# Patient Record
Sex: FEMALE | Race: OTHER | Employment: UNEMPLOYED | URBAN - METROPOLITAN AREA
[De-identification: names, ages, dates, MRNs, and addresses within clinical notes are randomized per-mention and may not be internally consistent; named-entity substitution may affect disease eponyms.]

---

## 2018-01-29 ENCOUNTER — OFFICE VISIT (OUTPATIENT)
Dept: INTERNAL MEDICINE CLINIC | Age: 43
End: 2018-01-29

## 2018-01-29 VITALS
SYSTOLIC BLOOD PRESSURE: 116 MMHG | OXYGEN SATURATION: 97 % | TEMPERATURE: 97.3 F | RESPIRATION RATE: 17 BRPM | DIASTOLIC BLOOD PRESSURE: 72 MMHG | WEIGHT: 133 LBS | HEIGHT: 60 IN | BODY MASS INDEX: 26.11 KG/M2 | HEART RATE: 73 BPM

## 2018-01-29 DIAGNOSIS — M25.561 ACUTE PAIN OF RIGHT KNEE: Primary | ICD-10-CM

## 2018-01-29 DIAGNOSIS — Z78.9 NON-ENGLISH SPEAKING PATIENT: ICD-10-CM

## 2018-01-29 DIAGNOSIS — M54.6 CHRONIC RIGHT-SIDED THORACIC BACK PAIN: ICD-10-CM

## 2018-01-29 DIAGNOSIS — R10.13 EPIGASTRIC ABDOMINAL PAIN: ICD-10-CM

## 2018-01-29 DIAGNOSIS — G89.29 CHRONIC RIGHT-SIDED THORACIC BACK PAIN: ICD-10-CM

## 2018-01-29 RX ORDER — PHENOL/SODIUM PHENOLATE
20 AEROSOL, SPRAY (ML) MUCOUS MEMBRANE DAILY
Qty: 30 TAB | Refills: 0 | Status: SHIPPED | OUTPATIENT
Start: 2018-01-29 | End: 2018-05-01 | Stop reason: SDUPTHER

## 2018-01-29 RX ORDER — NAPROXEN 500 MG/1
500 TABLET ORAL
Qty: 30 TAB | Refills: 1 | Status: SHIPPED | OUTPATIENT
Start: 2018-01-29 | End: 2018-02-26

## 2018-01-29 NOTE — MR AVS SNAPSHOT
[address] [place] [address]. [facility]man[address]or [facility] 
[phone] Patient: Belem Michaels MRN: [id number]:[id number] Visit Information Date & Time Provider Department Dept. Phone Encounter #  
 1/29/2018 11:15 AM Johnathan Byrnes MD [facility] Internal Medicine [phone] [id number] Follow-up Instructions Return in about 3 months (around 4/29/2018), or if symptoms worsen or fail to improve. Allergies as of 1/29/2018  Review Complete On: 1/29/2018 By: Whitney Freeman MD  
 No Known Allergies Current Immunizations  Never Reviewed No immunizations on file. Not reviewed this visit You Were Diagnosed With   
  
 Codes Comments Acute pain of right knee    -  Primary ICD-10-CM: M25.561 ICD-9-CM: 719.46 Non-English speaking patient     ICD-10-CM: Z78.9 ICD-9-CM: V40.1 Epigastric abdominal pain     ICD-10-CM: R10.13 ICD-9-CM: 789.06 Vitals BP Pulse Temp Resp Height(growth percentile) 116/72 (BP 1 Location: Left arm, BP Patient Position: Sitting) 73 97.3 °F (36.3 °C) (Temporal) 17 5' (1.524 m) Weight(growth percentile) SpO2 BMI Smoking Status 133 lb (60.3 kg) 97% 25.97 kg/m2 Never Smoker Vitals History BMI and BSA Data Body Mass Index Body Surface Area  
 25.97 kg/m 2 1.6 m 2 Your Updated Medication List  
  
   
This list is accurate as of: 1/29/18 12:07 PM.  Always use your most recent med list.  
  
  
  
  
 naproxen 500 mg tablet Commonly known as:  NAPROSYN Take 1 Tab by mouth two (2) times daily as needed. With food. Omeprazole delayed release 20 mg tablet Commonly known as:  PRILOSEC D/R Take 1 Tab by mouth daily. Prescriptions Printed Refills  
 naproxen (NAPROSYN) 500 mg tablet 1 Sig: Take 1 Tab by mouth two (2) times daily as needed. With food. Class: Print  Route: Oral  
 Omeprazole delayed release (PRILOSEC D/R) 20 mg tablet 0  
 Sig: Take 1 Tab by mouth daily. Class: Print Route: Oral  
  
Follow-up Instructions Return in about 3 months (around 4/29/2018), or if symptoms worsen or fail to improve. To-Do List   
 01/29/2018 Imaging:  XR KNEE RT MIN 4 V Introducing [facility] & HEALTH SERVICES! Elias Franco introduces Ahonya patient portal. Now you can access parts of your medical record, email your doctor's office, and request medication refills online. 1. In your internet browser, go to [email or web address] 2. Click on the First Time User? Click Here link in the Sign In box. You will see the New Member Sign Up page. 3. Enter your Ahonya Access Code exactly as it appears below. You will not need to use this code after youve completed the sign-up process. If you do not sign up before the expiration date, you must request a new code. · Ahonya Access Code: [email or web address] Expires: 4/29/2018 11:44 AM 
 
4. Enter the last four digits of your Social Security Number (xxxx) and Date of Birth (mm/dd/yyyy) as indicated and click Submit. You will be taken to the next sign-up page. 5. Create a Ahonya ID. This will be your Ahonya login ID and cannot be changed, so think of one that is secure and easy to remember. 6. Create a Ahonya password. You can change your password at any time. 7. Enter your Password Reset Question and Answer. This can be used at a later time if you forget your password. 8. Enter your e-mail address. You will receive e-mail notification when new information is available in [address] 9. Click Sign Up. You can now view and download portions of your medical record. 10. Click the Download Summary menu link to download a portable copy of your medical information. If you have questions, please visit the Frequently Asked Questions section of the Ahonya website. Remember, Ahonya is NOT to be used for urgent needs. For medical emergencies, dial 911. Now available from your iPhone and Android! Please provide this summary of care documentation to your next provider. If you have any questions after today's visit, please call [phone].
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
## 2018-01-30 ENCOUNTER — HOSPITAL ENCOUNTER (OUTPATIENT)
Dept: GENERAL RADIOLOGY | Age: 43
Discharge: HOME | End: 2018-01-30
Attending: FAMILY MEDICINE
Payer: MEDICAID

## 2018-01-30 DIAGNOSIS — M25.561 ACUTE PAIN OF RIGHT KNEE: ICD-10-CM

## 2018-01-30 PROCEDURE — 73562 X-RAY EXAM OF KNEE 3: CPT

## 2018-02-01 NOTE — PROGRESS NOTES
Please mail the result: There is arthritic change in  right knee joint otherwise no fracture or fluid. Follow up with me if symptoms worsen.

## 2018-02-26 ENCOUNTER — OFFICE VISIT (OUTPATIENT)
Dept: INTERNAL MEDICINE CLINIC | Age: 43
End: 2018-02-26

## 2018-02-26 VITALS
SYSTOLIC BLOOD PRESSURE: 114 MMHG | OXYGEN SATURATION: 95 % | TEMPERATURE: 98.2 F | RESPIRATION RATE: 18 BRPM | BODY MASS INDEX: 26.31 KG/M2 | DIASTOLIC BLOOD PRESSURE: 74 MMHG | HEART RATE: 84 BPM | HEIGHT: 60 IN | WEIGHT: 134 LBS

## 2018-02-26 DIAGNOSIS — M54.9 CHRONIC MIDLINE BACK PAIN, UNSPECIFIED BACK LOCATION: Primary | ICD-10-CM

## 2018-02-26 DIAGNOSIS — M54.2 NECK PAIN: ICD-10-CM

## 2018-02-26 DIAGNOSIS — R52 BODY ACHES: ICD-10-CM

## 2018-02-26 DIAGNOSIS — G89.29 CHRONIC MIDLINE BACK PAIN, UNSPECIFIED BACK LOCATION: Primary | ICD-10-CM

## 2018-02-26 DIAGNOSIS — M25.561 ARTHRALGIA OF KNEE, RIGHT: ICD-10-CM

## 2018-02-26 DIAGNOSIS — G89.29 UPPER BACK PAIN, CHRONIC: ICD-10-CM

## 2018-02-26 DIAGNOSIS — M54.9 UPPER BACK PAIN, CHRONIC: ICD-10-CM

## 2018-02-26 RX ORDER — DICLOFENAC SODIUM 75 MG/1
75 TABLET, DELAYED RELEASE ORAL 2 TIMES DAILY
Qty: 60 TAB | Refills: 1 | Status: SHIPPED | OUTPATIENT
Start: 2018-02-26 | End: 2018-03-28 | Stop reason: SDUPTHER

## 2018-02-26 RX ORDER — DULOXETIN HYDROCHLORIDE 20 MG/1
20 CAPSULE, DELAYED RELEASE ORAL DAILY
Qty: 30 CAP | Refills: 3 | Status: SHIPPED | OUTPATIENT
Start: 2018-02-26 | End: 2018-03-28 | Stop reason: SDUPTHER
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
## 2018-02-26 NOTE — MR AVS SNAPSHOT
[address] [place] [address] [address] [place] [address] 
[phone] Patient: Son Capps MRN: [id number] Visit Information Date & Time Provider Department Dept. Phone Encounter #  
 2/26/2018  4:15 PM John Thao MD [facility] Internal Medicine [phone] [id number] Follow-up Instructions Return in about 1 month (around 3/26/2018). Upcoming Health Maintenance Date Due  
 PAP AKA CERVICAL CYTOLOGY 1/1/1995 DTaP/Tdap/Td series (2 - Td) 1/11/2028 Allergies as of 2/26/2018  Review Complete On: 2/26/2018 By: John Thao MD  
 No Known Allergies Current Immunizations  Never Reviewed Name Date Influenza Vaccine 1/11/2018 Tdap 1/11/2018 Not reviewed this visit You Were Diagnosed With   
  
 Codes Comments Chronic midline back pain, unspecified back location    -  Primary ICD-10-CM: M54.9, G89.29 ICD-9-CM: 724.5, 338.29 Upper back pain, chronic     ICD-10-CM: M54.9, G89.29 ICD-9-CM: 724.5, 338.29 Neck pain     ICD-10-CM: M54.2 ICD-9-CM: 723.1 Body aches     ICD-10-CM: R52 ICD-9-CM: 780.96 Arthralgia of knee, right     ICD-10-CM: M25.561 ICD-9-CM: 719.46 Vitals BP Pulse Temp Resp Height(growth percentile) 114/74 (BP 1 Location: Left arm, BP Patient Position: Sitting) 84 98.2 °F (36.8 °C) (Temporal) 18 5' (1.524 m) Weight(growth percentile) SpO2 BMI OB Status Smoking Status 134 lb (60.8 kg) 95% 26.17 kg/m2 Having regular periods Never Smoker Vitals History BMI and BSA Data Body Mass Index Body Surface Area  
 26.17 kg/m 2 1.6 m 2 Your Updated Medication List  
  
   
This list is accurate as of 2/26/18  4:36 PM.  Always use your most recent med list.  
  
  
  
  
 diclofenac EC 75 mg EC tablet Commonly known as:  VOLTAREN Take 1 Tab by mouth two (2) times a day. DULoxetine 20 mg capsule Commonly known as:  CYMBALTA Take 1 Cap by mouth daily. Omeprazole delayed release 20 mg tablet Commonly known as:  PRILOSEC D/R Take 1 Tab by mouth daily. Prescriptions Printed Refills DULoxetine (CYMBALTA) 20 mg capsule 3 Sig: Take 1 Cap by mouth daily. Class: Print Route: Oral  
 diclofenac EC (VOLTAREN) 75 mg EC tablet 1 Sig: Take 1 Tab by mouth two (2) times a day. Class: Print Route: Oral  
  
Follow-up Instructions Return in about 1 month (around 3/26/2018). To-Do List   
 02/26/2018 Imaging:  XR SPINE CERV 4 OR 5 V   
  
 02/26/2018 Imaging:  XR SPINE LUMB 2 OR 3 V   
  
 02/26/2018 Imaging:  XR SPINE THORAC 3 V Patient Instructions Learning About How to Have a Healthy Back What causes back pain? Back pain is often caused by overuse, strain, or injury. For example, people often hurt their backs playing sports or working in the yard, being jolted in a car accident, or lifting something too heavy. Aging plays a part too. Your bones and muscles tend to lose strength as you age, which makes injury more likely. The spongy discs between the bones of the spine (vertebrae) may suffer from wear and tear and no longer provide enough cushion between the bones. A disc that bulges or breaks open (herniated disc) can press on nerves, causing back pain. In some people, back pain is the result of arthritis, broken vertebrae caused by bone loss (osteoporosis), illness, or a spine problem. Although most people have back pain at one time or another, there are steps you can take to make it less likely. How can you have a healthy back? Reduce stress on your back through good posture Slumping or slouching alone may not cause low back pain. But after the back has been strained or injured, bad posture can make pain worse.  
· Sleep in a position that maintains your back's normal curves and on a mattress that feels comfortable. Sleep on your side with a pillow between your knees, or sleep on your back with a pillow under your knees. These positions can reduce strain on your back. · Stand and sit up straight. \"Good posture\" generally means your ears, shoulders, and hips are in a straight line. · If you must stand for a long time, put one foot on a stool, ledge, or box. Switch feet every now and then. · Sit in a chair that is low enough to let you place both feet flat on the floor with both knees nearly level with your hips. If your chair or desk is too high, use a footrest to raise your knees. Place a small pillow, a rolled-up towel, or a lumbar roll in the curve of your back if you need extra support. · Try a kneeling chair, which helps tilt your hips forward. This takes pressure off your lower back. · Try sitting on an exercise ball. It can rock from side to side, which helps keep your back loose. · When driving, keep your knees nearly level with your hips. Sit straight, and drive with both hands on the steering wheel. Your arms should be in a slightly bent position. Reduce stress on your back through careful lifting · Squat down, bending at the hips and knees only. If you need to, put one knee to the floor and extend your other knee in front of you, bent at a right angle (half kneeling). · Press your chest straight forward. This helps keep your upper back straight while keeping a slight arch in your low back. · Hold the load as close to your body as possible, at the level of your belly button (navel). · Use your feet to change direction, taking small steps. · Lead with your hips as you change direction. Keep your shoulders in line with your hips as you move. · Set down your load carefully, squatting with your knees and hips only. Exercise and stretch your back · Do some exercise on most days of the week, if your doctor says it is okay. You can walk, run, swim, or cycle. · Stretch your back muscles. Here are a few exercises to try: ¨ Lie on your back, and gently pull one bent knee to your chest. Put that foot back on the floor, and then pull the other knee to your chest. 
¨ Do pelvic tilts. Lie on your back with your knees bent. Tighten your stomach muscles. Pull your belly button (navel) in and up toward your ribs. You should feel like your back is pressing to the floor and your hips and pelvis are slightly lifting off the floor. Hold for 6 seconds while breathing smoothly. ¨ Sit with your back flat against a wall. · Keep your core muscles strong. The muscles of your back, belly (abdomen), and buttocks support your spine. ¨ Pull in your belly and imagine pulling your navel toward your spine. Hold this for 6 seconds, then relax. Remember to keep breathing normally as you tense your muscles. ¨ Do curl-ups. Always do them with your knees bent. Keep your low back on the floor, and curl your shoulders toward your knees using a smooth, slow motion. Keep your arms folded across your chest. If this bothers your neck, try putting your hands behind your neck (not your head), with your elbows spread apart. ¨ Lie on your back with your knees bent and your feet flat on the floor. Tighten your belly muscles, and then push with your feet and raise your buttocks up a few inches. Hold this position 6 seconds as you continue to breathe normally, then lower yourself slowly to the floor. Repeat 8 to 12 times. ¨ If you like group exercise, try Pilates or yoga. These classes have poses that strengthen the core muscles. Lead a healthy lifestyle · Stay at a healthy weight to avoid strain on your back. · Do not smoke. Smoking increases the risk of osteoporosis, which weakens the spine. If you need help quitting, talk to your doctor about stop-smoking programs and medicines. These can increase your chances of quitting for good. Where can you learn more? Go to http://gisel-melani.info/. Enter L315 in the search box to learn more about \"Learning About How to Have a Healthy Back. \" Current as of: March 21, 2017 Content Version: 11.4 © 0640-8095 Healthwise, Incorporated. Care instructions adapted under license by [employer] (which disclaims liability or warranty for this information). If you have questions about a medical condition or this instruction, always ask your healthcare professional. [address] any warranty or liability for your use of this information. Introducing [facility] & HEALTH SERVICES! Robert Azul introduces Viewpoint patient portal. Now you can access parts of your medical record, email your doctor's office, and request medication refills online. 1. In your internet browser, go to [email or web address] 2. Click on the First Time User? Click Here link in the Sign In box. You will see the New Member Sign Up page. 3. Enter your Viewpoint Access Code exactly as it appears below. You will not need to use this code after youve completed the sign-up process. If you do not sign up before the expiration date, you must request a new code. · Viewpoint Access Code: [email or web address] Expires: 4/29/2018 11:44 AM 
 
4. Enter the last four digits of your Social Security Number (xxxx) and Date of Birth (mm/dd/yyyy) as indicated and click Submit. You will be taken to the next sign-up page. 5. Create a Viewpoint ID. This will be your Viewpoint login ID and cannot be changed, so think of one that is secure and easy to remember. 6. Create a Viewpoint password. You can change your password at any time. 7. Enter your Password Reset Question and Answer. This can be used at a later time if you forget your password. 8. Enter your e-mail address. You will receive e-mail notification when new information is available in [address] 9. Click Sign Up.  You can now view and download portions of your medical record. 10. Click the Download Summary menu link to download a portable copy of your medical information. If you have questions, please visit the Frequently Asked Questions section of the [email or web address] website. Remember, [email or web address] is NOT to be used for urgent needs. For medical emergencies, dial 911. Now available from your iPhone and Android! Please provide this summary of care documentation to your next provider. Your primary care clinician is listed as Johnathan Pressley. If you have any questions after today's visit, please call [phone].

## 2018-02-26 NOTE — PATIENT INSTRUCTIONS
Learning About How to Have a Healthy Back  What causes back pain? Back pain is often caused by overuse, strain, or injury. For example, people often hurt their backs playing sports or working in the yard, being jolted in a car accident, or lifting something too heavy. Aging plays a part too. Your bones and muscles tend to lose strength as you age, which makes injury more likely. The spongy discs between the bones of the spine (vertebrae) may suffer from wear and tear and no longer provide enough cushion between the bones. A disc that bulges or breaks open (herniated disc) can press on nerves, causing back pain. In some people, back pain is the result of arthritis, broken vertebrae caused by bone loss (osteoporosis), illness, or a spine problem. Although most people have back pain at one time or another, there are steps you can take to make it less likely. How can you have a healthy back? Reduce stress on your back through good posture  Slumping or slouching alone may not cause low back pain. But after the back has been strained or injured, bad posture can make pain worse. · Sleep in a position that maintains your back's normal curves and on a mattress that feels comfortable. Sleep on your side with a pillow between your knees, or sleep on your back with a pillow under your knees. These positions can reduce strain on your back. · Stand and sit up straight. \"Good posture\" generally means your ears, shoulders, and hips are in a straight line. · If you must stand for a long time, put one foot on a stool, ledge, or box. Switch feet every now and then. · Sit in a chair that is low enough to let you place both feet flat on the floor with both knees nearly level with your hips. If your chair or desk is too high, use a footrest to raise your knees. Place a small pillow, a rolled-up towel, or a lumbar roll in the curve of your back if you need extra support.   · Try a kneeling chair, which helps tilt your hips forward. This takes pressure off your lower back. · Try sitting on an exercise ball. It can rock from side to side, which helps keep your back loose. · When driving, keep your knees nearly level with your hips. Sit straight, and drive with both hands on the steering wheel. Your arms should be in a slightly bent position. Reduce stress on your back through careful lifting  · Squat down, bending at the hips and knees only. If you need to, put one knee to the floor and extend your other knee in front of you, bent at a right angle (half kneeling). · Press your chest straight forward. This helps keep your upper back straight while keeping a slight arch in your low back. · Hold the load as close to your body as possible, at the level of your belly button (navel). · Use your feet to change direction, taking small steps. · Lead with your hips as you change direction. Keep your shoulders in line with your hips as you move. · Set down your load carefully, squatting with your knees and hips only. Exercise and stretch your back  · Do some exercise on most days of the week, if your doctor says it is okay. You can walk, run, swim, or cycle. · Stretch your back muscles. Here are a few exercises to try:  Ronald Ort on your back, and gently pull one bent knee to your chest. Put that foot back on the floor, and then pull the other knee to your chest.  ¨ Do pelvic tilts. Lie on your back with your knees bent. Tighten your stomach muscles. Pull your belly button (navel) in and up toward your ribs. You should feel like your back is pressing to the floor and your hips and pelvis are slightly lifting off the floor. Hold for 6 seconds while breathing smoothly. ¨ Sit with your back flat against a wall. · Keep your core muscles strong. The muscles of your back, belly (abdomen), and buttocks support your spine. ¨ Pull in your belly and imagine pulling your navel toward your spine. Hold this for 6 seconds, then relax.  Remember to keep breathing normally as you tense your muscles. ¨ Do curl-ups. Always do them with your knees bent. Keep your low back on the floor, and curl your shoulders toward your knees using a smooth, slow motion. Keep your arms folded across your chest. If this bothers your neck, try putting your hands behind your neck (not your head), with your elbows spread apart. ¨ Lie on your back with your knees bent and your feet flat on the floor. Tighten your belly muscles, and then push with your feet and raise your buttocks up a few inches. Hold this position 6 seconds as you continue to breathe normally, then lower yourself slowly to the floor. Repeat 8 to 12 times. ¨ If you like group exercise, try Pilates or yoga. These classes have poses that strengthen the core muscles. Lead a healthy lifestyle  · Stay at a healthy weight to avoid strain on your back. · Do not smoke. Smoking increases the risk of osteoporosis, which weakens the spine. If you need help quitting, talk to your doctor about stop-smoking programs and medicines. These can increase your chances of quitting for good. Where can you learn more? Go to http://gisel-melani.info/. Enter L315 in the search box to learn more about \"Learning About How to Have a Healthy Back. \"  Current as of: March 21, 2017  Content Version: 11.4  © 3392-6523 Healthwise, [employer]orporated. Care instructions adapted under license by [employer] (which disclaims liability or warranty for this information). If you have questions about a medical condition or this instruction, always ask your healthcare professional. [place] [address] any warranty or liability for your use of this information.

## 2018-03-07 ENCOUNTER — HOSPITAL ENCOUNTER (OUTPATIENT)
Dept: GENERAL RADIOLOGY | Age: 43
Discharge: HOME | End: 2018-03-07
Payer: MEDICAID

## 2018-03-07 DIAGNOSIS — M54.9 CHRONIC MIDLINE BACK PAIN, UNSPECIFIED BACK LOCATION: ICD-10-CM

## 2018-03-07 DIAGNOSIS — M54.2 NECK PAIN: ICD-10-CM

## 2018-03-07 DIAGNOSIS — M54.9 UPPER BACK PAIN, CHRONIC: ICD-10-CM

## 2018-03-07 DIAGNOSIS — G89.29 CHRONIC MIDLINE BACK PAIN, UNSPECIFIED BACK LOCATION: ICD-10-CM

## 2018-03-07 DIAGNOSIS — G89.29 UPPER BACK PAIN, CHRONIC: ICD-10-CM

## 2018-03-07 PROCEDURE — 72072 X-RAY EXAM THORAC SPINE 3VWS: CPT

## 2018-03-07 PROCEDURE — 72050 X-RAY EXAM NECK SPINE 4/5VWS: CPT

## 2018-03-07 PROCEDURE — 72100 X-RAY EXAM L-S SPINE 2/3 VWS: CPT

## 2018-03-08 ENCOUNTER — OFFICE VISIT (OUTPATIENT)
Dept: INTERNAL MEDICINE CLINIC | Age: 43
End: 2018-03-08

## 2018-03-08 VITALS
DIASTOLIC BLOOD PRESSURE: 69 MMHG | SYSTOLIC BLOOD PRESSURE: 106 MMHG | TEMPERATURE: 98.4 F | HEART RATE: 87 BPM | HEIGHT: 60 IN | RESPIRATION RATE: 18 BRPM | BODY MASS INDEX: 26.31 KG/M2 | OXYGEN SATURATION: 100 % | WEIGHT: 134 LBS

## 2018-03-08 DIAGNOSIS — R05.9 COUGH: Primary | ICD-10-CM

## 2018-03-08 DIAGNOSIS — M51.36 DDD (DEGENERATIVE DISC DISEASE), LUMBAR: ICD-10-CM

## 2018-03-08 RX ORDER — GUAIFENESIN 600 MG/1
600 TABLET, EXTENDED RELEASE ORAL 2 TIMES DAILY
Qty: 14 TAB | Refills: 0 | Status: SHIPPED | OUTPATIENT
Start: 2018-03-08 | End: 2018-06-29 | Stop reason: ALTCHOICE

## 2018-03-08 RX ORDER — GUAIFENESIN 600 MG/1
600 TABLET, EXTENDED RELEASE ORAL 2 TIMES DAILY
Qty: 14 TAB | Refills: 0 | Status: SHIPPED | OUTPATIENT
Start: 2018-03-08 | End: 2018-03-08 | Stop reason: SDUPTHER

## 2018-03-08 NOTE — PROGRESS NOTES
Subjective:     Chief Complaint   Patient presents with    Results     pt here to discuss xray        Maria M Gao: [id number]    She  is a 39y.o. year old female from [place] who presents today to discuss Xray result as swell as with a complain of cough. Two weeks ago she was here with a complain of chronic back pain which initiated to have Xray done. The neck and thoracic  Xray looks normal.   Lumbar Xray showed : Three views of the lumbar spine are obtained. Loss of disc height at L5-S1. Facet degenerative change at L5-S1 as well. Visualized osseous structures are without evidence of fracture-dislocation. There is no significant soft tissue abnormality identified.         Patient is also here with a complain of intermittent dry cough started a week ago. Denies any fever, chills, chest pain, wheezing. Did not try any OTC med for the cough. Pertinent items are noted in HPI.   Objective:     Vitals:    18 1536   BP: 106/69   Pulse: 87   Resp: 18   Temp: 98.4 °F (36.9 °C)   TempSrc: Temporal   SpO2: 100%   Weight: 134 lb (60.8 kg)   Height: 5' (1.524 m)       Physical Examination: General appearance - alert, well appearing, and in no distress, oriented to person, place, and time and overweight  Mental status - alert, oriented to person, place, and time, normal mood, behavior, speech, dress, motor activity, and thought processes  Chest - clear to auscultation, no wheezes, rales or rhonchi, symmetric air entry  Heart - normal rate, regular rhythm, normal S1, S2, no murmurs, rubs, clicks or gallops    No Known Allergies   Social History     Social History    Marital status:      Spouse name: N/A    Number of children: N/A    Years of education: N/A     Social History Main Topics    Smoking status: Never Smoker    Smokeless tobacco: Never Used    Alcohol use No    Drug use: No    Sexual activity: No     Other Topics Concern    None     Social History Narrative      No family history on file. History reviewed. No pertinent surgical history. History reviewed. No pertinent past medical history. Current Outpatient Prescriptions   Medication Sig Dispense Refill    guaiFENesin ER (MUCINEX) 600 mg ER tablet Take 1 Tab by mouth two (2) times a day. 14 Tab 0    DULoxetine (CYMBALTA) 20 mg capsule Take 1 Cap by mouth daily. 30 Cap 3    diclofenac EC (VOLTAREN) 75 mg EC tablet Take 1 Tab by mouth two (2) times a day. 60 Tab 1    Omeprazole delayed release (PRILOSEC D/R) 20 mg tablet Take 1 Tab by mouth daily. 30 Tab 0        Assessment/ Plan:   Diagnoses and all orders for this visit:    1. Cough  -   Start  guaiFENesin ER (MUCINEX) 600 mg ER tablet; Take 1 Tab by mouth two (2) times a day. -    Follow up if symptoms worsen  2. DDD (degenerative disc disease), lumbar        -    Discussed the result of the xray with her. Cervical and thoracic Xray is normal.   Lumbar Xray showed L5-S1 DDD. patient reports that diclofenac as needed has been helping with the pain. Medication risks/benefits/costs/interactions/alternatives discussed with patient. Advised patient to call back or return to office if symptoms worsen/change/persist. If patient cannot reach us or should anything more severe/urgent arise he/she should proceed directly to the nearest emergency department. Discussed expected course/resolution/complications of diagnosis in detail with patient. Patient given a written after visit summary which includes her diagnoses, current medications and vitals. Patient expressed understanding with the diagnosis and plan.        Follow-up Disposition: Not on File

## 2018-03-28 ENCOUNTER — OFFICE VISIT (OUTPATIENT)
Dept: INTERNAL MEDICINE CLINIC | Age: 43
End: 2018-03-28

## 2018-03-28 VITALS
SYSTOLIC BLOOD PRESSURE: 111 MMHG | BODY MASS INDEX: 25.72 KG/M2 | WEIGHT: 131 LBS | HEIGHT: 60 IN | HEART RATE: 93 BPM | RESPIRATION RATE: 17 BRPM | DIASTOLIC BLOOD PRESSURE: 72 MMHG | TEMPERATURE: 97.6 F | OXYGEN SATURATION: 97 %

## 2018-03-28 DIAGNOSIS — M25.561 CHRONIC PAIN OF BOTH KNEES: Primary | ICD-10-CM

## 2018-03-28 DIAGNOSIS — M54.9 CHRONIC MIDLINE BACK PAIN, UNSPECIFIED BACK LOCATION: ICD-10-CM

## 2018-03-28 DIAGNOSIS — G89.29 CHRONIC PAIN OF BOTH KNEES: Primary | ICD-10-CM

## 2018-03-28 DIAGNOSIS — R05.8 DRY COUGH: ICD-10-CM

## 2018-03-28 DIAGNOSIS — M25.561 ARTHRALGIA OF KNEE, RIGHT: ICD-10-CM

## 2018-03-28 DIAGNOSIS — G89.29 UPPER BACK PAIN, CHRONIC: ICD-10-CM

## 2018-03-28 DIAGNOSIS — M54.2 NECK PAIN: ICD-10-CM

## 2018-03-28 DIAGNOSIS — N92.6 ABNORMAL MENSTRUAL PERIODS: ICD-10-CM

## 2018-03-28 DIAGNOSIS — M54.9 UPPER BACK PAIN, CHRONIC: ICD-10-CM

## 2018-03-28 DIAGNOSIS — M25.562 CHRONIC PAIN OF BOTH KNEES: Primary | ICD-10-CM

## 2018-03-28 DIAGNOSIS — G89.29 CHRONIC MIDLINE BACK PAIN, UNSPECIFIED BACK LOCATION: ICD-10-CM

## 2018-03-28 DIAGNOSIS — R52 BODY ACHES: ICD-10-CM

## 2018-03-28 RX ORDER — DULOXETIN HYDROCHLORIDE 20 MG/1
20 CAPSULE, DELAYED RELEASE ORAL DAILY
Qty: 30 CAP | Refills: 3 | Status: SHIPPED | OUTPATIENT
Start: 2018-03-28 | End: 2018-05-01 | Stop reason: SDUPTHER

## 2018-03-28 RX ORDER — DICLOFENAC SODIUM 75 MG/1
75 TABLET, DELAYED RELEASE ORAL 2 TIMES DAILY
Qty: 60 TAB | Refills: 1 | Status: SHIPPED | OUTPATIENT
Start: 2018-03-28 | End: 2018-06-29 | Stop reason: SDUPTHER

## 2018-03-28 NOTE — PROGRESS NOTES
Subjective:     Chief Complaint   Patient presents with    Generalized Body Aches     1 mo f/u    Cough       Salena interpretor [id number]    She  is a 39y.o. year old female who presents today for one month follow up. She was here last month with a complain of pain all over her body. She reports that knee pain has been stable with diclofenac. According to patient she was not aware of the med Cymbalta prescribed in last month appointment. She does mention that she still having dry cough but better than last time. No fever, chest pain. Patient mention that she has been having vaginal burning sensation and abnormal period. Would like to see a gynecologist.     More info in last notes. Pertinent items are noted in HPI. Objective:     Vitals:    03/28/18 0919   BP: 111/72   Pulse: 93   Resp: 17   Temp: 97.6 °F (36.4 °C)   TempSrc: Temporal   SpO2: 97%   Weight: 131 lb (59.4 kg)   Height: 5' (1.524 m)       Physical Examination: General appearance - alert, well appearing, and in no distress, oriented to person, place, and time and normal appearing weight  Mental status - alert, oriented to person, place, and time, normal mood, behavior, speech, dress, motor activity, and thought processes  Chest - clear to auscultation, no wheezes, rales or rhonchi, symmetric air entry  Heart - normal rate, regular rhythm, normal S1, S2, no murmurs, rubs, clicks or gallops  Musculoskeletal - no joint tenderness, deformity or swelling  Extremities - peripheral pulses normal, no pedal edema, no clubbing or cyanosis    No Known Allergies   Social History     Social History    Marital status:      Spouse name: N/A    Number of children: N/A    Years of education: N/A     Social History Main Topics    Smoking status: Never Smoker    Smokeless tobacco: Never Used    Alcohol use No    Drug use: No    Sexual activity: No     Other Topics Concern    None     Social History Narrative      No family history on file. History reviewed. No pertinent surgical history. History reviewed. No pertinent past medical history. Current Outpatient Prescriptions   Medication Sig Dispense Refill    guaiFENesin ER (MUCINEX) 600 mg ER tablet Take 1 Tab by mouth two (2) times a day. 14 Tab 0    DULoxetine (CYMBALTA) 20 mg capsule Take 1 Cap by mouth daily. 30 Cap 3    diclofenac EC (VOLTAREN) 75 mg EC tablet Take 1 Tab by mouth two (2) times a day. 60 Tab 1    Omeprazole delayed release (PRILOSEC D/R) 20 mg tablet Take 1 Tab by mouth daily. 30 Tab 0        Assessment/ Plan:   Diagnoses and all orders for this visit:    1. Chronic pain of both knees  -     diclofenac EC (VOLTAREN) 75 mg EC tablet; Take 1 Tab by mouth two (2) times a day. 2. Abnormal menstrual periods  [facility] ref [facility]    3. Body aches  -     DULoxetine (CYMBALTA) 20 mg capsule; Take 1 Cap by mouth daily. -     diclofenac EC (VOLTAREN) 75 mg EC tablet; Take 1 Tab by mouth two (2) times a day. 4. Cough:     -      patient reports that cough is getting better. 5. Chronic midline back pain, unspecified back location  -     diclofenac EC (VOLTAREN) 75 mg EC tablet; Take 1 Tab by mouth two (2) times a day. 6. Upper back pain, chronic  -     diclofenac EC (VOLTAREN) 75 mg EC tablet; Take 1 Tab by mouth two (2) times a day. 7. Neck pain  -     diclofenac EC (VOLTAREN) 75 mg EC tablet; Take 1 Tab by mouth two (2) times a day. 8. Arthralgia of knee, right  -     diclofenac EC (VOLTAREN) 75 mg EC tablet; Take 1 Tab by mouth two (2) times a day. Medication risks/benefits/costs/interactions/alternatives discussed with patient. Advised patient to call back or return to office if symptoms worsen/change/persist. If patient cannot reach us or should anything more severe/urgent arise he/she should proceed directly to the nearest emergency department. Discussed expected course/resolution/complications of diagnosis in detail with patient.   Patient given a written after visit summary which includes her diagnoses, current medications and vitals. Patient expressed understanding with the diagnosis and plan.        Follow-up Disposition: Not on File

## 2018-03-28 NOTE — MR AVS SNAPSHOT
[address] [place] [address]. Lolis Umanzor [address] [place] [address] 
[phone] Patient: Scarlette Brittle MRN: [id number] FJD:[id number] Visit Information Date & Time Provider Department Dept. Phone Encounter #  
 3/28/2018  9:30 AM MD Yue Beltrán [facility] Internal Medicine [phone] [id number] Follow-up Instructions Return in about 3 months (around 6/28/2018). Your Appointments 4/10/2018  2:00 PM  
PHYSICAL PRE OP with Sheridan Medellin DO [facility] Internal Medicine ([facility]) Appt Note: NP Est PCP  spt 03/15/18 scheduled by [place] [address] [phone] [address] [place] [address]  
[phone]  
  
   
 [address] [facility] [address] Upcoming Health Maintenance Date Due  
 PAP AKA CERVICAL CYTOLOGY 1/1/1995 DTaP/Tdap/Td series (2 - Td) 1/11/2028 Allergies as of 3/28/2018  Review Complete On: 3/28/2018 By: Rhonda Kauffman LPN No Known Allergies Current Immunizations  Never Reviewed Name Date Influenza Vaccine 1/11/2018 Tdap 1/11/2018 Not reviewed this visit You Were Diagnosed With   
  
 Codes Comments Chronic pain of both knees    -  Primary ICD-10-CM: M25.561, M25.562, G89.29 ICD-9-CM: 719.46, 338.29 Abnormal menstrual periods     ICD-10-CM: N92.6 ICD-9-CM: 626.2 Body aches     ICD-10-CM: R52 ICD-9-CM: 780.96 Chronic midline back pain, unspecified back location     ICD-10-CM: M54.9, G89.29 ICD-9-CM: 724.5, 338.29 Upper back pain, chronic     ICD-10-CM: M54.9, G89.29 ICD-9-CM: 724.5, 338.29 Neck pain     ICD-10-CM: M54.2 ICD-9-CM: 723.1 Arthralgia of knee, right     ICD-10-CM: M25.561 ICD-9-CM: 719.46 Vitals BP Pulse Temp Resp Height(growth percentile) 111/72 (BP 1 Location: Left arm, BP Patient Position: Sitting) 93 97.6 °F (36.4 °C) (Temporal) 17 5' (1.524 m) Weight(growth percentile) SpO2 BMI OB Status Smoking Status 131 lb (59.4 kg) 97% 25.58 kg/m2 Having regular periods Never Smoker Vitals History BMI and BSA Data Body Mass Index Body Surface Area 25.58 kg/m 2 1.59 m 2 Your Updated Medication List  
  
   
This list is accurate as of 3/28/18  9:44 AM.  Always use your most recent med list.  
  
  
  
  
 diclofenac EC 75 mg EC tablet Commonly known as:  VOLTAREN Take 1 Tab by mouth two (2) times a day. DULoxetine 20 mg capsule Commonly known as:  CYMBALTA Take 1 Cap by mouth daily. guaiFENesin  mg ER tablet Commonly known as:  Eliecer & Eliecer Take 1 Tab by mouth two (2) times a day. Omeprazole delayed release 20 mg tablet Commonly known as:  PRILOSEC D/R Take 1 Tab by mouth daily. Prescriptions Printed Refills DULoxetine (CYMBALTA) 20 mg capsule 3 Sig: Take 1 Cap by mouth daily. Class: Print Route: Oral  
 diclofenac EC (VOLTAREN) 75 mg EC tablet 1 Sig: Take 1 Tab by mouth two (2) times a day. Class: Print Route: Oral  
  
We Performed the Following REFERRAL TO OBSTETRICS AND GYNECOLOGY [REF51 Custom] Follow-up Instructions Return in about 3 months (around 6/28/2018). Referral Information Referral ID Referred By Referred To  
  
 [id number] ROBIN KENT MD   
   [address] Phone: [phone] Fax: [phone] Visits Status Start Date End Date 1 New Request 3/28/18 3/28/19 If your referral has a status of pending review or denied, additional information will be sent to support the outcome of this decision. Introducing \[facility]\"" & HEALTH SERVICES! [facility] introduces [employer] patient portal. Now you can access parts of your medical record, email your doctor's office, and request medication refills online.    
 
1. In your internet browser, go to [email or web address] 2. Click on the First Time User? Click Here link in the Sign In box. You will see the New Member Sign Up page. 3. Enter your [employer] Access Code exactly as it appears below. You will not need to use this code after youve completed the sign-up process. If you do not sign up before the expiration date, you must request a new code. · [employer] Access Code: [email or web address] Expires: 4/29/2018 12:44 PM 
 
4. Enter the last four digits of your Social Security Number (xxxx) and Date of Birth (mm/dd/yyyy) as indicated and click Submit. You will be taken to the next sign-up page. 5. Create a InvenQueryt ID. This will be your [employer] login ID and cannot be changed, so think of one that is secure and easy to remember. 6. Create a [employer] password. You can change your password at any time. 7. Enter your Password Reset Question and Answer. This can be used at a later time if you forget your password. 8. Enter your e-mail address. You will receive e-mail notification when new information is available in [address] 9. Click Sign Up. You can now view and download portions of your medical record. 10. Click the Download Summary menu link to download a portable copy of your medical information. If you have questions, please visit the Frequently Asked Questions section of the [employer] website. Remember, [employer] is NOT to be used for urgent needs. For medical emergencies, dial 911. Now available from your iPhone and Android! Please provide this summary of care documentation to your next provider. Your primary care clinician is listed as Johnathan Pressley. If you have any questions after today's visit, please call [phone].

## 2018-04-10 ENCOUNTER — OFFICE VISIT (OUTPATIENT)
Dept: INTERNAL MEDICINE CLINIC | Age: 43
End: 2018-04-10

## 2018-04-10 VITALS
HEART RATE: 90 BPM | RESPIRATION RATE: 19 BRPM | OXYGEN SATURATION: 99 % | DIASTOLIC BLOOD PRESSURE: 75 MMHG | WEIGHT: 131 LBS | HEIGHT: 60 IN | SYSTOLIC BLOOD PRESSURE: 110 MMHG | BODY MASS INDEX: 25.72 KG/M2

## 2018-04-10 DIAGNOSIS — G89.29 CHRONIC MIDLINE LOW BACK PAIN WITH RIGHT-SIDED SCIATICA: Primary | ICD-10-CM

## 2018-04-10 DIAGNOSIS — K21.9 GASTROESOPHAGEAL REFLUX DISEASE WITHOUT ESOPHAGITIS: ICD-10-CM

## 2018-04-10 DIAGNOSIS — M25.562 CHRONIC PAIN OF BOTH KNEES: ICD-10-CM

## 2018-04-10 DIAGNOSIS — G89.29 CHRONIC PAIN OF BOTH KNEES: ICD-10-CM

## 2018-04-10 DIAGNOSIS — N92.6 IRREGULAR PERIODS/MENSTRUAL CYCLES: ICD-10-CM

## 2018-04-10 DIAGNOSIS — M54.41 CHRONIC MIDLINE LOW BACK PAIN WITH RIGHT-SIDED SCIATICA: Primary | ICD-10-CM

## 2018-04-10 DIAGNOSIS — F33.41 RECURRENT MAJOR DEPRESSIVE DISORDER, IN PARTIAL REMISSION (HCC): ICD-10-CM

## 2018-04-10 DIAGNOSIS — M25.561 CHRONIC PAIN OF BOTH KNEES: ICD-10-CM

## 2018-04-10 RX ORDER — DICLOFENAC SODIUM 10 MG/G
GEL TOPICAL 4 TIMES DAILY
Qty: 100 G | Refills: 5 | Status: SHIPPED | OUTPATIENT
Start: 2018-04-10 | End: 2019-02-12 | Stop reason: SDUPTHER

## 2018-04-10 NOTE — PATIENT INSTRUCTIONS
Joint Injections: Care Instructions  Your Care Instructions  Joint injections are shots into a joint, such as the knee. They may be used to put in medicines, such as pain relievers. Or they can be used to take out fluid. Sometimes the fluid is tested in a lab. This can help find the cause of a joint problem. A corticosteroid, or steroid, shot is used to reduce inflammation in tendons or joints. It is often used to treat problems such as arthritis, tendinitis, and bursitis. Steroids can be injected directly into a painful, inflamed joint. They can also help reduce inflammation of a bursa. A bursa is a sac of fluid. It cushions and lubricates areas where tendons, ligaments, skin, muscles, or bones rub against each other. A steroid shot can sometimes help with short-term pain relief when other treatments haven't worked. If steroid shots help, pain may improve for weeks or months. Follow-up care is a key part of your treatment and safety. Be sure to make and go to all appointments, and call your doctor if you are having problems. It's also a good idea to know your test results and keep a list of the medicines you take. How can you care for yourself at home? · Put ice or a cold pack on the area for 10 to 20 minutes at a time. Put a thin cloth between the ice and your skin. · Take anti-inflammatory medicines to reduce pain, swelling, or inflammation. These include ibuprofen (Advil, Motrin) and naproxen (Aleve). Read and follow all instructions on the label. · Avoid strenuous activities for several days, especially those that put stress on the area where you got the shot. · If you have dressings over the area, keep them clean and dry. You may remove them when your doctor tells you to. When should you call for help? Call your doctor now or seek immediate medical care if:  ? · You have signs of infection, such as:  ¨ Increased pain, swelling, warmth, or redness. ¨ Red streaks leading from the site.   ¨ Pus draining from the site. ¨ A fever. ? Watch closely for changes in your health, and be sure to contact your doctor if you have any problems. Where can you learn more? Go to http://gisel-melani.info/. Enter N616 in the search box to learn more about \"Joint Injections: Care Instructions. \"  Current as of: March 21, 2017  Content Version: 11.4  © 2608-4622 [employer]. Care instructions adapted under license by [employer] (which disclaims liability or warranty for this information). If you have questions about a medical condition or this instruction, always ask your healthcare professional. [address] any warranty or liability for your use of this information.

## 2018-04-10 NOTE — PROGRESS NOTES
Health Maintenance Due   Topic Date Due    PAP AKA CERVICAL CYTOLOGY  01/01/1995       Chief Complaint   Patient presents with    LOW BACK PAIN    Knee Pain    New Patient       1. Have you been to the ER, urgent care clinic since your last visit? Hospitalized since your last visit? No    2. Have you seen or consulted any other health care providers outside of the [address] [place] [address] since your last visit? Include any pap smears or colon screening. No    3) Do you have an Advance Directive on file? no    4) Are you interested in receiving information on Advance Directives? NO      Patient is accompanied by self I have received verbal consent from [facility] to discuss any/all medical information while they are present in the room. [facility]  OFFICE PROCEDURE PROGRESS NOTE        Chart reviewed for the following:   Shayy NESS LPN, have reviewed the History, Physical and updated the Allergic reactions for [address] performed immediately prior to start of procedure:   Shayy NESS LPN, have performed the following reviews on [facility] prior to the start of the procedure:            * Patient was identified by name and date of birth   * Agreement on procedure being performed was verified  * Risks and Benefits explained to the patient  * Procedure site verified and marked as necessary  * Patient was positioned for comfort  * Consent was signed and verified     Time: 3:30p.m.        Date of procedure: 4/10/2018    Procedure performed by:  Tammy Sheikh DO    Provider assisted by: Radha Joe LPN    Patient assisted by: self    How tolerated by patient: tolerated the procedure well with no complications    Post Procedural Pain Scale: 2 - Hurts Little Bit    Comments: none

## 2018-04-10 NOTE — MR AVS SNAPSHOT
2700 [facility] [address] [place] [address] 
[phone] Patient: Cristobal Peace MRN: [id number] MACARIO:[birth year] Visit Information Date & Time Provider Department Dept. Phone Encounter #  
 4/10/2018  2:00 PM Jones Wilkinson, [facility] Internal Medicine [phone] [id number] Follow-up Instructions Return in about 2 weeks (around 4/24/2018). Upcoming Health Maintenance Date Due  
 PAP AKA CERVICAL CYTOLOGY 1/1/1995 DTaP/Tdap/Td series (2 - Td) 1/11/2028 Allergies as of 4/10/2018  Review Complete On: 4/10/2018 By: Jones Wilkinson, DO No Known Allergies Current Immunizations  Never Reviewed Name Date Influenza Vaccine 1/11/2018 Tdap 1/11/2018 Not reviewed this visit You Were Diagnosed With   
  
 Codes Comments Chronic midline low back pain with right-sided sciatica    -  Primary ICD-10-CM: M54.41, G89.29 ICD-9-CM: 724.2, 724.3, 338.29 Chronic pain of both knees     ICD-10-CM: M25.561, M25.562, G89.29 ICD-9-CM: 719.46, 338.29 Recurrent major depressive disorder, in partial remission ([facility]     ICD-10-CM: F33.41 
ICD-9-CM: 296.35 Gastroesophageal reflux disease without esophagitis     ICD-10-CM: K21.9 ICD-9-CM: 530.81 Irregular periods/menstrual cycles     ICD-10-CM: N92.6 ICD-9-CM: 626.4 Vitals BP Pulse Resp Height(growth percentile) Weight(growth percentile) SpO2  
 110/75 (BP 1 Location: Left arm, BP Patient Position: Sitting) 90 19 5' (1.524 m) 131 lb (59.4 kg) 99% BMI OB Status Smoking Status 25.58 kg/m2 Having regular periods Never Smoker Vitals History BMI and BSA Data Body Mass Index Body Surface Area 25.58 kg/m 2 1.59 m 2 Preferred Pharmacy Pharmacy Name Phone [id number] [place], [address] [facility] [phone] Your Updated Medication List  
  
   
 This list is accurate as of 4/10/18  3:34 PM.  Always use your most recent med list.  
  
  
  
  
 * diclofenac EC 75 mg EC tablet Commonly known as:  VOLTAREN Take 1 Tab by mouth two (2) times a day. * diclofenac 1 % Gel Commonly known as:  VOLTAREN Apply  to affected area four (4) times daily. DULoxetine 20 mg capsule Commonly known as:  CYMBALTA Take 1 Cap by mouth daily. guaiFENesin  mg ER tablet Commonly known as:  Eliecer & Eliecer Take 1 Tab by mouth two (2) times a day. Omeprazole delayed release 20 mg tablet Commonly known as:  PRILOSEC D/R Take 1 Tab by mouth daily. * Notice: This list has 2 medication(s) that are the same as other medications prescribed for you. Read the directions carefully, and ask your doctor or other care provider to review them with you. Prescriptions Sent to Pharmacy Refills  
 diclofenac (VOLTAREN) 1 % gel 5 Sig: Apply  to affected area four (4) times daily. Class: Normal  
 Pharmacy: [facility]/pharmacy #[address] [place] [address] #: [phone] Route: Topical  
  
We Performed the Following DRAIN/INJECT LARGE JOINT/BURSA [id number] CPT(R)] METHYLPREDNISOLONE ACETATE INJECTION 40 MG [ \[facility]\""] REFERRAL TO OBSTETRICS AND GYNECOLOGY [REF51 Custom] REFERRAL TO PHYSICAL THERAPY [IAA91 Custom] Follow-up Instructions Return in about 2 weeks (around 4/24/2018). Referral Information Referral ID Referred By Referred To  
  
 [id number] Carmen Nj MD   
   [address] 103 [facility], [facility] [address] Phone: [phone] Fax: [phone] Visits Status Start Date End Date 1 New Request 4/10/18 4/10/19 If your referral has a status of pending review or denied, additional information will be sent to support the outcome of this decision. Referral ID Referred By Referred To [id number] [facility] AMMON Rice 310 Phone: [phone] Visits Status Start Date End Date 1 New Request 4/10/18 4/10/19 If your referral has a status of pending review or denied, additional information will be sent to support the outcome of this decision. Introducing [facility] SERVICES! [facility] introduces zEconomy patient portal. Now you can access parts of your medical record, email your doctor's office, and request medication refills online. 1. In your internet browser, go to [email or web address] 2. Click on the First Time User? Click Here link in the Sign In box. You will see the New Member Sign Up page. 3. Enter your zEconomy Access Code exactly as it appears below. You will not need to use this code after youve completed the sign-up process. If you do not sign up before the expiration date, you must request a new code. · zEconomy Access Code: [email or web address] Expires: 4/29/2018 12:44 PM 
 
4. Enter the last four digits of your Social Security Number (xxxx) and Date of Birth (mm/dd/yyyy) as indicated and click Submit. You will be taken to the next sign-up page. 5. Create a zEconomy ID. This will be your zEconomy login ID and cannot be changed, so think of one that is secure and easy to remember. 6. Create a zEconomy password. You can change your password at any time. 7. Enter your Password Reset Question and Answer. This can be used at a later time if you forget your password. 8. Enter your e-mail address. You will receive e-mail notification when new information is available in [address] 9. Click Sign Up. You can now view and download portions of your medical record. 10. Click the Download Summary menu link to download a portable copy of your medical information.  
 
If you have questions, please visit the Frequently Asked Questions section of the [employer]. Remember, [employer] is NOT to be used for urgent needs. For medical emergencies, dial 911. Now available from your iPhone and Android! Please provide this summary of care documentation to your next provider. Your primary care clinician is listed as Johnathan Pressley. If you have any questions after today's visit, please call [phone].

## 2018-04-30 NOTE — PROGRESS NOTES
HISTORY OF PRESENT ILLNESS  Maxime Aviles is a 39 y.o. female. New patient who comes in to establish care. Has seen other [facility] physicians in the past.  Reports chronic low back pain with right-sided sciatica. Also having chronic bilateral arthritic knee pain. Reports being depressed. Also having GI issues with GERD. Her menstrual periods have been irregular. Reports having a lot of stress. Denies tobacco or alcohol use. Med list and most recent studies reviewed with patient. X-rays of right knee and lumbar spine showed arthritis. LOW BACK PAIN   Pertinent negatives include no chest pain, no abdominal pain, no headaches and no shortness of breath. Knee Pain   Pertinent negatives include no chest pain, no abdominal pain, no headaches and no shortness of breath. New Patient   Pertinent negatives include no chest pain, no abdominal pain, no headaches and no shortness of breath. Review of Systems   Constitutional: Negative for fever, malaise/fatigue and weight loss. HENT: Negative for congestion. Eyes: Negative for blurred vision. Respiratory: Negative for cough and shortness of breath. Cardiovascular: Negative for chest pain and leg swelling. Gastrointestinal: Positive for heartburn. Negative for abdominal pain. Genitourinary: Negative for dysuria and frequency. Musculoskeletal: Positive for joint pain. Negative for back pain and falls. Skin: Negative for rash. Neurological: Negative for dizziness, sensory change and headaches. Psychiatric/Behavioral: Positive for depression. The patient is not nervous/anxious and does not have insomnia. All other systems reviewed and are negative. Physical Exam   Constitutional: She is oriented to person, place, and time. She appears well-developed and well-nourished. No distress. HENT:   Head: Normocephalic and atraumatic.    Mouth/Throat: Oropharynx is clear and moist.   Eyes: Conjunctivae and EOM are normal. Pupils are equal, round, and reactive to light. No scleral icterus. Neck: Normal range of motion. Neck supple. No JVD present. No thyromegaly present. Cardiovascular: Normal rate, regular rhythm, normal heart sounds and intact distal pulses. No murmur heard. Pulmonary/Chest: Effort normal and breath sounds normal. No respiratory distress. She has no wheezes. She has no rales. Abdominal: Soft. Bowel sounds are normal. She exhibits no distension. There is no tenderness. Musculoskeletal: She exhibits tenderness (Bilateral knees, lumbars). She exhibits no edema. DJD   Lymphadenopathy:     She has no cervical adenopathy. Neurological: She is alert and oriented to person, place, and time. Coordination normal.   Skin: Skin is warm and dry. No rash noted. Psychiatric: Her behavior is normal.   Depressed/stressed   Nursing note and vitals reviewed. ASSESSMENT and PLAN  Diagnoses and all orders for this visit:    1. Chronic midline low back pain with right-sided sciatica  -     [facility] [place]    2. Chronic pain of both knees  -     DRAIN/INJECT LARGE JOINT/BURSA  -     METHYLPREDNISOLONE ACETATE INJECTION 40 MG  -     [facility] [place]    3. Recurrent major depressive disorder, in partial remission ([facility]    4. Gastroesophageal reflux disease without esophagitis    5. Irregular periods/menstrual cycles  [facility] [place]    Other orders  -     diclofenac (VOLTAREN) 1 % gel; Apply  to affected area four (4) times daily. Follow-up Disposition:  Return in about 2 weeks (around 4/24/2018). lab results and schedule of future lab studies reviewed with patient  reviewed diet, exercise and weight control  reviewed medications and side effects in detail  Advised patient to do some back exercises  Right knee injected in sterile fashion with Depomedrol 40 mg and Lidocaine 1% 1.5cc without any complications.

## 2018-05-01 ENCOUNTER — OFFICE VISIT (OUTPATIENT)
Dept: INTERNAL MEDICINE CLINIC | Age: 43
End: 2018-05-01

## 2018-05-01 VITALS
SYSTOLIC BLOOD PRESSURE: 110 MMHG | OXYGEN SATURATION: 96 % | WEIGHT: 131 LBS | DIASTOLIC BLOOD PRESSURE: 75 MMHG | HEART RATE: 84 BPM | BODY MASS INDEX: 25.72 KG/M2 | RESPIRATION RATE: 19 BRPM | HEIGHT: 60 IN

## 2018-05-01 DIAGNOSIS — M54.41 CHRONIC MIDLINE LOW BACK PAIN WITH RIGHT-SIDED SCIATICA: ICD-10-CM

## 2018-05-01 DIAGNOSIS — K21.9 GASTROESOPHAGEAL REFLUX DISEASE WITHOUT ESOPHAGITIS: ICD-10-CM

## 2018-05-01 DIAGNOSIS — G89.29 CHRONIC PAIN OF BOTH KNEES: Primary | ICD-10-CM

## 2018-05-01 DIAGNOSIS — G89.29 CHRONIC MIDLINE LOW BACK PAIN WITH RIGHT-SIDED SCIATICA: ICD-10-CM

## 2018-05-01 DIAGNOSIS — R52 BODY ACHES: ICD-10-CM

## 2018-05-01 DIAGNOSIS — M25.562 CHRONIC PAIN OF BOTH KNEES: Primary | ICD-10-CM

## 2018-05-01 DIAGNOSIS — R10.13 EPIGASTRIC ABDOMINAL PAIN: ICD-10-CM

## 2018-05-01 DIAGNOSIS — M25.561 CHRONIC PAIN OF BOTH KNEES: Primary | ICD-10-CM

## 2018-05-01 DIAGNOSIS — M25.50 ARTHRALGIA, UNSPECIFIED JOINT: ICD-10-CM

## 2018-05-01 DIAGNOSIS — F33.41 RECURRENT MAJOR DEPRESSIVE DISORDER, IN PARTIAL REMISSION (HCC): ICD-10-CM

## 2018-05-01 DIAGNOSIS — F43.10 PTSD (POST-TRAUMATIC STRESS DISORDER): ICD-10-CM

## 2018-05-01 RX ORDER — DULOXETIN HYDROCHLORIDE 60 MG/1
60 CAPSULE, DELAYED RELEASE ORAL DAILY
Qty: 30 CAP | Refills: 5 | Status: SHIPPED | OUTPATIENT
Start: 2018-05-01 | End: 2018-10-23 | Stop reason: ALTCHOICE

## 2018-05-01 RX ORDER — PHENOL/SODIUM PHENOLATE
20 AEROSOL, SPRAY (ML) MUCOUS MEMBRANE DAILY
Qty: 30 TAB | Refills: 5 | Status: SHIPPED | OUTPATIENT
Start: 2018-05-01 | End: 2018-10-23 | Stop reason: ALTCHOICE

## 2018-05-01 NOTE — LETTER
5/4/2018 2:42 PM 
 
Ms. Lety Walter 
[address] [facility] [address] Dear Jarred Harper: 
 
Please find your most recent results below. Resulted Orders METABOLIC PANEL, COMPREHENSIVE Result Value Ref Range Glucose 91 65 - 99 mg/dL BUN 9 6 - 24 mg/dL Creatinine 0.51 (L) 0.57 - 1.00 mg/dL GFR est non- >59 mL/min/1.73 GFR est  >59 mL/min/1.73  
 BUN/Creatinine ratio 18 9 - 23 Sodium 141 134 - 144 mmol/L Potassium 3.9 3.5 - 5.2 mmol/L Chloride 104 96 - 106 mmol/L  
 CO2 21 18 - 29 mmol/L Calcium 8.8 8.7 - 10.2 mg/dL Protein, total 6.8 6.0 - 8.5 g/dL Albumin 4.1 3.5 - 5.5 g/dL GLOBULIN, TOTAL 2.7 1.5 - 4.5 g/dL A-G Ratio 1.5 1.2 - 2.2 Bilirubin, total 0.2 0.0 - 1.2 mg/dL Alk. phosphatase 42 39 - 117 IU/L  
 AST (SGOT) 16 0 - 40 IU/L  
 ALT (SGPT) 14 0 - 32 IU/L Narrative Performed at:  [address]  [id number] : Matti Mena MD, Phone:  [phone] CRP, HIGH SENSITIVITY Result Value Ref Range C-Reactive Protein, Cardiac 1.07 0.00 - 3.00 mg/L Comment:  
            Relative Risk for Future Cardiovascular Event Low                 <1.00 Average       1.00 - 3.00 High                >3.00 Narrative Performed at:  [address]  [id number] : Matti Mena MD, Phone:  [phone] SED RATE (ESR) Result Value Ref Range Sed rate (ESR) 11 0 - 32 mm/hr Narrative Performed at:  [address]  [id number] : Matti Mena MD, Phone:  [phone] RHEUMATOID FACTOR, QL Result Value Ref Range Rheumatoid factor <10.0 0.0 - 13.9 IU/mL Narrative Performed at:  [address]  [id number] : Mere Newton MD, Phone:  [phone] RECOMMENDATIONS: 
All labs are stable Please call me if you have any questions: [phone] Sincerely, 
 
 
Anais Champion, DO

## 2018-05-01 NOTE — PROGRESS NOTES
HISTORY OF PRESENT ILLNESS  Ember Melgar is a 39 y.o. female. Pt. comes in for f/u. Has multiple medical problems. Continues to have her chronic pains as outlined below. Medications help. He started Cymbalta recently. Reports having abdominal pain and indigestion. Her depression is doing better. Followed by psychiatrist.  Reports compliance with medications and diet. Med list and most recent labs/studies reviewed with pt. Trying to be active physically to control weight. Needs med refills. Due for repeat labs. Reports no other new c/o. Back Pain    Pertinent negatives include no chest pain, no fever, no weight loss, no headaches, no abdominal pain and no dysuria. Arm Pain    Associated symptoms include back pain and neck pain. Knee Pain   Pertinent negatives include no chest pain, no abdominal pain, no headaches and no shortness of breath. Review of Systems   Constitutional: Negative for fever, malaise/fatigue and weight loss. HENT: Negative for congestion. Eyes: Negative for blurred vision. Respiratory: Negative for cough and shortness of breath. Cardiovascular: Negative for chest pain and leg swelling. Gastrointestinal: Positive for heartburn. Negative for abdominal pain. Genitourinary: Negative for dysuria and frequency. Musculoskeletal: Positive for back pain, joint pain and neck pain. Negative for falls. Skin: Negative for rash. Neurological: Negative for dizziness, sensory change, focal weakness and headaches. Psychiatric/Behavioral: Positive for depression. The patient is not nervous/anxious and does not have insomnia. All other systems reviewed and are negative. Physical Exam   Constitutional: She is oriented to person, place, and time. She appears well-developed and well-nourished. No distress. HENT:   Head: Normocephalic and atraumatic. Mouth/Throat: Oropharynx is clear and moist.   Eyes: Conjunctivae are normal.   Neck: Normal range of motion.  Neck supple. No JVD present. No thyromegaly present. Cardiovascular: Normal rate, regular rhythm, normal heart sounds and intact distal pulses. No murmur heard. Pulmonary/Chest: Effort normal and breath sounds normal. No respiratory distress. She has no wheezes. She has no rales. Abdominal: Soft. Bowel sounds are normal. She exhibits no distension. There is no tenderness. Musculoskeletal: She exhibits tenderness (Bilateral knees, lumbars,shoudlers,feet). She exhibits no edema. DJD   Neurological: She is alert and oriented to person, place, and time. Coordination normal.   Skin: Skin is warm and dry. No rash noted. Psychiatric: Her behavior is normal.   Depressed/stressed   Nursing note and vitals reviewed. ASSESSMENT and PLAN  Diagnoses and all orders for this visit:    1. Chronic pain of both knees    2. Chronic midline low back pain with right-sided sciatica    3. Recurrent major depressive disorder, in partial remission ([facility]    4. PTSD (post-traumatic stress disorder)    5. Gastroesophageal reflux disease without esophagitis    6. Body aches  -     Increase DULoxetine (CYMBALTA) 60 mg capsule; Take 1 Cap by mouth daily. 7. Epigastric abdominal pain  -     Omeprazole delayed release (PRILOSEC D/R) 20 mg tablet; Take 1 Tab by mouth daily. 8. Arthralgia, unspecified joint  -     METABOLIC PANEL, COMPREHENSIVE  -     CRP, HIGH SENSITIVITY  -     SED RATE (ESR)  -     RHEUMATOID FACTOR, QL      Follow-up Disposition:  Return in about 3 months (around 8/1/2018).    lab results and schedule of future lab studies reviewed with patient  reviewed diet, exercise and weight control  reviewed medications and side effects in detail  F/u with other MD's as scheduled  Start PT as recommended

## 2018-05-01 NOTE — MR AVS SNAPSHOT
[address] [facility] 102 [address] [place] [address] 
[phone] Patient: Maxime Aviles MRN: [id number] ZGP:[birth year] Visit Information Date & Time Provider Department Dept. Phone Encounter #  
 5/1/2018 10:15 AM Feliz Nyhan [facility] Internal Medicine [phone] [id number] Follow-up Instructions Return in about 3 months (around 8/1/2018). Your Appointments 6/11/2018 10:10 AM  
New Patient with Kelvin Madrid MD  
[facility] OB-GYN AT [address] [place] [address] ([facility]) Appt Note: NG/needs  [place] [address], [address], [address] [place] [id number]  
  
    
 7/11/2018  2:00 PM  
PHYSICAL PRE OP with Nadia Beasley MD  
[address] [place] [address] OFFICE-ANN ([facility]) Appt Note: Np, est pcp & discuss shoulder pain, $0cp $0pb, LMJ 04/30/2018  
 [address] [place] [id number] [phone] [facility] [phone] Upcoming Health Maintenance Date Due  
 PAP AKA CERVICAL CYTOLOGY 1/1/1995 Influenza Age 5 to Adult 8/1/2018 DTaP/Tdap/Td series (2 - Td) 1/11/2028 Allergies as of 5/1/2018  Review Complete On: 5/1/2018 By: Levi Castillo, DO No Known Allergies Current Immunizations  Never Reviewed Name Date Influenza Vaccine 1/11/2018 Tdap 1/11/2018 Not reviewed this visit You Were Diagnosed With   
  
 Codes Comments Chronic pain of both knees    -  Primary ICD-10-CM: M25.561, M25.562, G89.29 ICD-9-CM: 719.46, 338.29 Chronic midline low back pain with right-sided sciatica     ICD-10-CM: M54.41, G89.29 ICD-9-CM: 724.2, 724.3, 338.29 Recurrent major depressive disorder, in partial remission ([facility]     ICD-10-CM: F33.41 
ICD-9-CM: 296.35 Gastroesophageal reflux disease without esophagitis     ICD-10-CM: K21.9 ICD-9-CM: 530.81   
 Body aches     ICD-10-CM: R52 ICD-9-CM: 780.96 Epigastric abdominal pain     ICD-10-CM: R10.13 ICD-9-CM: 789.06 Arthralgia, unspecified joint     ICD-10-CM: M25.50 ICD-9-CM: 719.40 Vitals BP Pulse Resp Height(growth percentile) Weight(growth percentile) SpO2  
 110/75 (BP 1 Location: Right arm, BP Patient Position: Sitting) 84 19 5' (1.524 m) 131 lb (59.4 kg) 96% BMI OB Status Smoking Status 25.58 kg/m2 Having regular periods Never Smoker Vitals History BMI and BSA Data Body Mass Index Body Surface Area 25.58 kg/m 2 1.59 m 2 Preferred Pharmacy Pharmacy Name Phone [id number] [phone] Your Updated Medication List  
  
   
This list is accurate as of 5/1/18 11:06 AM.  Always use your most recent med list.  
  
  
  
  
 * diclofenac EC 75 mg EC tablet Commonly known as:  VOLTAREN Take 1 Tab by mouth two (2) times a day. * diclofenac 1 % Gel Commonly known as:  VOLTAREN Apply  to affected area four (4) times daily. DULoxetine 60 mg capsule Commonly known as:  CYMBALTA Take 1 Cap by mouth daily. guaiFENesin  mg ER tablet Commonly known as:  Eliecer & Eliecer Take 1 Tab by mouth two (2) times a day. Omeprazole delayed release 20 mg tablet Commonly known as:  PRILOSEC D/R Take 1 Tab by mouth daily. * Notice: This list has 2 medication(s) that are the same as other medications prescribed for you. Read the directions carefully, and ask your doctor or other care provider to review them with you. Prescriptions Sent to Pharmacy Refills DULoxetine (CYMBALTA) 60 mg capsule 5 Sig: Take 1 Cap by mouth daily. Class: Normal  
 Pharmacy: [facility]/pharmacy #[id number] #: [phone]  Route: Oral  
 Omeprazole delayed release (PRILOSEC D/R) 20 mg tablet 5  
 Sig: Take 1 Tab by mouth daily. Class: Normal  
 Pharmacy: [facility]/pharmacy #[id number]- [place], [address] [facility] #: [phone] Route: Oral  
  
We Performed the Following CRP, HIGH SENSITIVITY [37171 CPT(R)] METABOLIC PANEL, COMPREHENSIVE [56961 CPT(R)] RHEUMATOID FACTOR, QL [id number] CPT(R)] SED RATE (ESR) [id number] CPT(R)] Follow-up Instructions Return in about 3 months (around 8/1/2018). To-Do List   
 05/02/2018 9:30 AM  
  Appointment with Leo Merlos. MIRZA [place] at [facility] ([phone]) Introducing [facility] & [facility] SERVICES! [employer] introduces MediSwipe patient portal. Now you can access parts of your medical record, email your doctor's office, and request medication refills online. 1. In your internet browser, go to [email or web address] 2. Click on the First Time User? Click Here link in the Sign In box. You will see the New Member Sign Up page. 3. Enter your MediSwipe Access Code exactly as it appears below. You will not need to use this code after youve completed the sign-up process. If you do not sign up before the expiration date, you must request a new code. · MediSwipe Access Code: [email or web address] Expires: 7/30/2018 10:27 AM 
 
4. Enter the last four digits of your Social Security Number (xxxx) and Date of Birth (mm/dd/yyyy) as indicated and click Submit. You will be taken to the next sign-up page. 5. Create a MediSwipe ID. This will be your MediSwipe login ID and cannot be changed, so think of one that is secure and easy to remember. 6. Create a MediSwipe password. You can change your password at any time. 7. Enter your Password Reset Question and Answer. This can be used at a later time if you forget your password. 8. Enter your e-mail address. You will receive e-mail notification when new information is available in [address] 9. Click Sign Up.  You can now view and download portions of your medical record. 10. Click the Download Summary menu link to download a portable copy of your medical information. If you have questions, please visit the Frequently Asked Questions section of the [email or web address] website. Remember, [email or web address] is NOT to be used for urgent needs. For medical emergencies, dial 911. Now available from your iPhone and Android! Please provide this summary of care documentation to your next provider. Your primary care clinician is listed as Kathleen Kwan. If you have any questions after today's visit, please call [phone].

## 2018-05-01 NOTE — PROGRESS NOTES
Health Maintenance Due   Topic Date Due    PAP AKA CERVICAL CYTOLOGY  01/01/1995       Chief Complaint   Patient presents with    Back Pain    Arm Pain     right    Knee Pain     bilat       1. Have you been to the ER, urgent care clinic since your last visit? Hospitalized since your last visit? No    2. Have you seen or consulted any other health care providers outside of the [address] [place] [address] since your last visit? Include any pap smears or colon screening. No    3) Do you have an Advance Directive on file? no    4) Are you interested in receiving information on Advance Directives? NO      Patient is accompanied by self I have received verbal consent from [address] [place] [address] to discuss any/all medical information while they are present in the room.

## 2018-05-02 ENCOUNTER — HOSPITAL ENCOUNTER (OUTPATIENT)
Dept: PHYSICAL THERAPY | Age: 43
Discharge: HOME | End: 2018-05-02
Payer: MEDICAID

## 2018-05-02 LAB
ALBUMIN SERPL-MCNC: 4.1 G/DL (ref 3.5–5.5)
ALBUMIN/GLOB SERPL: 1.5 {RATIO} (ref 1.2–2.2)
ALP SERPL-CCNC: 42 IU/L (ref 39–117)
ALT SERPL-CCNC: 14 IU/L (ref 0–32)
AST SERPL-CCNC: 16 IU/L (ref 0–40)
BILIRUB SERPL-MCNC: 0.2 MG/DL (ref 0–1.2)
BUN SERPL-MCNC: 9 MG/DL (ref 6–24)
BUN/CREAT SERPL: 18 (ref 9–23)
CALCIUM SERPL-MCNC: 8.8 MG/DL (ref 8.7–10.2)
CHLORIDE SERPL-SCNC: 104 MMOL/L (ref 96–106)
CO2 SERPL-SCNC: 21 MMOL/L (ref 18–29)
CREAT SERPL-MCNC: 0.51 MG/DL (ref 0.57–1)
CRP SERPL HS-MCNC: 1.07 MG/L (ref 0–3)
ERYTHROCYTE [SEDIMENTATION RATE] IN BLOOD BY WESTERGREN METHOD: 11 MM/HR (ref 0–32)
GFR SERPLBLD CREATININE-BSD FMLA CKD-EPI: 117 ML/MIN/1.73
GFR SERPLBLD CREATININE-BSD FMLA CKD-EPI: 135 ML/MIN/1.73
GLOBULIN SER CALC-MCNC: 2.7 G/DL (ref 1.5–4.5)
GLUCOSE SERPL-MCNC: 91 MG/DL (ref 65–99)
POTASSIUM SERPL-SCNC: 3.9 MMOL/L (ref 3.5–5.2)
PROT SERPL-MCNC: 6.8 G/DL (ref 6–8.5)
RHEUMATOID FACT SERPL-ACNC: <10 IU/ML (ref 0–13.9)
SODIUM SERPL-SCNC: 141 MMOL/L (ref 134–144)

## 2018-05-02 PROCEDURE — 97110 THERAPEUTIC EXERCISES: CPT | Performed by: PHYSICAL THERAPIST

## 2018-05-02 PROCEDURE — 97161 PT EVAL LOW COMPLEX 20 MIN: CPT | Performed by: PHYSICAL THERAPIST

## 2018-05-02 NOTE — PROGRESS NOTES
PT INITIAL EVALUATION NOTE - [facility] 2-15    Patient Name: Merna Almanzar  Date:2018  : 1975  [x]  Patient  Verified  Payor: [address] / Plan: [facility] / Product Type: Managed Care Medicaid /    In time:930 A  Out time:1030  Total Treatment Time (min): 60  Total Timed Codes (min): 60 (40 eval, 20 timed see below)  Visit #: 1     Treatment Area: Mid back pain [M54.9]  Bilateral knee pain [M25.561, M25.562]    SUBJECTIVE  Any medication changes, allergies to medications, adverse drug reactions, diagnosis change, or new procedure performed?: [] No    [x] Yes (see summary sheet for update)    Pt speaks Farsi, no [place]. Most of subjective/objective portion with pt was via [employer] . Date of onset/injury: 5 years ago, onset of henry knee pain  ANCA: In [place] [facility], was doing heavy lifting. Seems to get worse since coming here  Pain:   9/10 max 7/10 min 7/10 now     Location of symptoms: ant knee, back of the calf  Description of symptoms: achy  Aggravated by: standing, lifting something heavy, if she walks for a long time, stairs, difficulty sleeping  Eased by: heat, massage  Prior tests/injections:x-ray of knee shows arthritis  PMH:  Depression, arthritis  Social: lives at home with 4 kids. 16, 15, 13. Any clicking/popping or giving way: yes  Occupation: Works in an [employer] 2:45 PM-11 PM.  Standing at work. 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 Prior level of function/activity level: sedentary  Patient goal: no pain    OBJECTIVE    Observation:henry genu varum    Gait: WNL    ROM: AROM and PROM henry knee flexion and ext WNL, pain with PROM ext only henry. Strength  5/5 unless noted below    Hip Flexion 3+    Extension 3+    Abduction 3+    Adduction 3+    IR 3+    ER 3+   Knee Flexion 4    Extension 4     Tenderness to palpation: henry quad, HS    Joint Mobility: hypomobility noted tibial post glide.       Mod edema noted (R) knee    LE Flexibility:  Dec HS, gastroc henry    Special Tests: all special tests for ligamentous pathology and meniscal pathology WNL                             OBJECTIVE    [x] Skin assessment post-treatment:  [x]intact []redness- no adverse reaction    []redness  adverse reaction:     20 min Therapeutic Exercise:  [x] See flow sheet :   Rationale: increase ROM and increase strength to improve the patients ability to climb stairs          With   [x] TE   [] TA   [] neuro   [] manual: Patient Education: [x] Review HEP    [] Progressed/Changed HEP based on:   [] positioning   [] body mechanics   [] transfers   [x] Ice/heat application- pt advised to ice 10-15 min 1-2 x/day to area in order to dec inflammation  [x] other:  re: mechanism of injury/condition, role of physical therapy, prognosis for recovery, heat vs ice, activity modifications. Pain Level (0-10 scale) post treatment: 7    ASSESSMENT/Changes in Function:     [x]  See Plan of Jaye.  Moshe PT, DPT, CMTPT  PT License Number: [id number]   5/2/2018  9:23 AM

## 2018-05-02 NOTE — PROGRESS NOTES
[facility]  [address]  [place], [address]  Phone: [phone]  Fax: [phone]    Plan of Care/Statement of Necessity for Physical Therapy Services  2-15    Patient name: Carlos Holm  : 1975  Provider#: [id number]  Referral source: Fred Granger DO      Medical/Treatment Diagnosis: Mid back pain [M54.9]  Bilateral knee pain [M25.561, M25.562]     Prior Hospitalization: see medical history     Comorbidities: see evaluation  Prior Level of Function:see evaluation  Medications: Verified on Patient Summary List  Start of Care: 2018     Onset Date:see evaluation   The Plan of Care and following information is based on the information from the initial evaluation.     Assessment/ key information: Patient presents with signs and symptoms consistent with henry knee OA and will benefit from physical therapy to address deficits noted below in problem list.     Evaluation Complexity History LOW Complexity : Zero comorbidities / personal factors that will impact the outcome / POC; Examination LOW Complexity : 1-2 Standardized tests and measures addressing body structure, function, activity limitation and / or participation in recreation  ;Presentation LOW Complexity : Stable, uncomplicated  ;Clinical Decision Making Other outcome measures clinical judgement  LOW   Overall Complexity Rating: LOW     Problem List: pain affecting function, decrease ROM, decrease strength, edema affecting function, decrease ADL/ functional abilitiies, decrease activity tolerance, decrease flexibility/ joint mobility and decrease transfer abilities   Treatment Plan may include any combination of the following: Therapeutic exercise, Therapeutic activities, Neuromuscular re-education, Physical agent/modality, Manual therapy, Patient education and Self Care training  Patient / Family readiness to learn indicated by: asking questions, trying to perform skills and interest  Persons(s) to be included in education: patient (P)  Barriers to Learning/Limitations: None  Patient Goal (s): please see evaluation in [facility]  Patient Self Reported Health Status: please see paper chart  Rehabilitation Potential: good    Short Term Goals: To be accomplished in 5 treatments:  -Independent in HEP as evidenced on ability to perform at least 5 exercises from HEP using proper form without verbal cuing.   -Pain less than or equal to 7/10 at worst to allow patient to perform ADL's with greater ease    Long Term Goals: To be accomplished in 10 treatments:  -Pt will be able to go up and down the stairs without pain  -MMT greater than or equal to 4-/5 all planes to allow patient to perform ADL's  -Pain 0-3/10 to allow patient to walk for 2 hours without pain    Frequency / Duration: Patient to be seen 1-2 times per week for 8-10 weeks. Patient/ Caregiver education and instruction: self care, activity modification and exercises    [x]  Plan of care has been reviewed with PTA    Certification Period: 5/2/2018 -  8/1/18    Partha Morataya. Moshe PT, DPT, CMTPT      2/0/6951 2:65 AM  PT License Number: [id number]  _____________________________________________________________________    I certify that the above Therapy Services are being furnished while the patient is under my care. I agree with the treatment plan and certify that this therapy is necessary.     [de-identified] Signature:____________________  Date:____________Time:_________

## 2018-05-10 ENCOUNTER — HOSPITAL ENCOUNTER (OUTPATIENT)
Dept: PHYSICAL THERAPY | Age: 43
Discharge: HOME | End: 2018-05-10
Payer: MEDICAID

## 2018-05-10 PROCEDURE — 97110 THERAPEUTIC EXERCISES: CPT | Performed by: PHYSICAL THERAPIST

## 2018-05-10 NOTE — PROGRESS NOTES
PT DAILY TREATMENT NOTE - [facility] 2-15    Patient Name: Bubba Nagy  Date:5/10/2018  : 1975  [x]  Patient  Verified  Payor: Anshu Mckeon / Plan: [facility] / Product Type: Managed Care Medicaid /    In time:930 A  Out time:1010 A  Total Treatment Time (min): 40  Total Timed Codes (min): 40   Visit #: 2     Treatment Area: Mid back pain [M54.9]  Bilateral knee pain [M25.561, M25.562]    SUBJECTIVE    Pt speaks Farsi, zero [place]. Most of subjective/objective portion with pt was via [employer] . Pain Level (0-10 scale): 7  Any medication changes, allergies to medications, adverse drug reactions, diagnosis change, or new procedure performed?: [x] No    [] Yes (see summary sheet for update)  Subjective functional status/changes: \"Knees still hurting. \"    OBJECTIVE    Modality rationale: decrease edema, decrease inflammation, decrease pain and reduce soreness post therapy in order to improve the patients ability to perform ADL's.    Min Type Additional Details    []  Ice     []  Heat   Position:   Location:     [x] Skin assessment post-treatment:  [x]intact []redness- no adverse reaction    []redness  adverse reaction:     40 min Therapeutic Exercise:  [x] See flow sheet :   Rationale: increase ROM, increase strength and improve functional mobility to improve the patients ability to walk and perform ADL's    With   [x] TE   [] TA   [] neuro   [] manual: Patient Education: [x] Review HEP    [] Progressed/Changed HEP based on:   [] positioning   [] body mechanics   [] transfers   [] heat/ice application    [] other:      Other Objective/Functional Measures:     Pain Level (0-10 scale) post treatment: 7    ASSESSMENT    Patient will continue to benefit from skilled PT services to modify and progress therapeutic interventions, address functional mobility deficits, address ROM deficits, address strength deficits and analyze and address soft tissue restrictions to attain remaining goals. Progress towards goals / Updated goals: Attempted SLR flexion, however this caused inc pain so opted for SAQ     PLAN  []  Upgrade activities as tolerated     [x]  Continue plan of care  []  Update interventions per flow sheet       []  Discharge due to:_  []  Other:_      Rievra Zabala.  Moshe, PT, DPT, CMTPT    1/95/8416    PT License Number: [id number]

## 2018-05-14 ENCOUNTER — APPOINTMENT (OUTPATIENT)
Dept: PHYSICAL THERAPY | Age: 43
End: 2018-05-14
Payer: MEDICAID

## 2018-05-22 ENCOUNTER — HOSPITAL ENCOUNTER (OUTPATIENT)
Dept: PHYSICAL THERAPY | Age: 43
Discharge: HOME | End: 2018-05-22
Payer: MEDICAID

## 2018-05-22 PROCEDURE — 97110 THERAPEUTIC EXERCISES: CPT | Performed by: PHYSICAL THERAPY ASSISTANT

## 2018-05-22 NOTE — PROGRESS NOTES
PT DAILY TREATMENT NOTE - [facility] 2-15    Patient Name: Antonio Garcia  Date:2018  : 1975  [x]  Patient  Verified  Payor: Chuy Mares / Plan: [address] [facility] / Product Type: Managed Care Medicaid /    In time:935 A  Out time:1025 A  Total Treatment Time (min): 50  Total Timed Codes (min): 50   Visit #: 3     Treatment Area: Mid back pain [M54.9]  Bilateral knee pain [M25.561, M25.562]    SUBJECTIVE    Pt speaks Farsi, zero [place]. Most of subjective/objective portion with pt was via [employer] . Pain Level (0-10 scale): 7  Any medication changes, allergies to medications, adverse drug reactions, diagnosis change, or new procedure performed?: [x] No    [] Yes (see summary sheet for update)  Subjective functional status/changes:       Patient reports ice makes her feel worse, she has not tried heat but is doing the exercises at home. States the pain is constant in her knees whether resting, lifting, walking. Reports tingling/burning sensation in lumbar region that radiates to B glut region. OBJECTIVE    Modality rationale: decrease edema, decrease inflammation, decrease pain and reduce soreness post therapy in order to improve the patients ability to perform ADL's.    Min Type Additional Details   At home []  Ice     []  Heat   Position:   Location:     [x] Skin assessment post-treatment:  [x]intact []redness- no adverse reaction    []redness  adverse reaction:     50 min Therapeutic Exercise:  [x] See flow sheet : added bridges, clamshells and LTR   Rationale: increase ROM, increase strength and improve functional mobility to improve the patients ability to walk and perform ADL's    With   [x] TE   [] TA   [] neuro   [] manual: Patient Education: [x] Review HEP    [] Progressed/Changed HEP based on:   [] positioning   [] body mechanics   [] transfers   [] heat/ice application    [x] other: discussion of trying to stay active to release natural pain relieving endorphins as well as reviewed postural principles while standing. Other Objective/Functional Measures:     Pain Level (0-10 scale) post treatment: 7    ASSESSMENT    Patient will continue to benefit from skilled PT services to modify and progress therapeutic interventions, address functional mobility deficits, address ROM deficits, address strength deficits and analyze and address soft tissue restrictions to attain remaining goals. Progress towards goals / Updated goals:  Patient with report of pain during bridges so discontinued, able to perform LTR without pain. Encouraged patient to heat prior to exercises, ice after. Discussion of compression sleeve while at work for pain relief, gave patient handout regarding this.      PLAN  []  Upgrade activities as tolerated     [x]  Continue plan of care  []  Update interventions per flow sheet       []  Discharge due to:_  []  Other:_      Sheldon Shirley, GAMAL   5/22/2018

## 2018-05-31 ENCOUNTER — HOSPITAL ENCOUNTER (OUTPATIENT)
Dept: PHYSICAL THERAPY | Age: 43
Discharge: HOME | End: 2018-05-31
Payer: MEDICAID

## 2018-05-31 PROCEDURE — 97110 THERAPEUTIC EXERCISES: CPT | Performed by: PHYSICAL THERAPIST

## 2018-06-15 ENCOUNTER — OFFICE VISIT (OUTPATIENT)
Dept: OBGYN CLINIC | Age: 43
End: 2018-06-15

## 2018-06-15 VITALS
BODY MASS INDEX: 25.6 KG/M2 | HEIGHT: 60 IN | DIASTOLIC BLOOD PRESSURE: 72 MMHG | WEIGHT: 130.4 LBS | SYSTOLIC BLOOD PRESSURE: 110 MMHG

## 2018-06-15 DIAGNOSIS — Z11.3 SCREENING FOR VENEREAL DISEASE: ICD-10-CM

## 2018-06-15 DIAGNOSIS — N89.8 VAGINAL DISCHARGE: ICD-10-CM

## 2018-06-15 DIAGNOSIS — N92.6 IRREGULAR MENSES: ICD-10-CM

## 2018-06-15 DIAGNOSIS — Z01.419 WELL WOMAN EXAM: Primary | ICD-10-CM

## 2018-06-15 DIAGNOSIS — Z11.51 SCREENING FOR HUMAN PAPILLOMAVIRUS: ICD-10-CM

## 2018-06-15 DIAGNOSIS — R10.2 PELVIC PAIN: ICD-10-CM

## 2018-06-15 DIAGNOSIS — Z12.31 SCREENING MAMMOGRAM, ENCOUNTER FOR: ICD-10-CM

## 2018-06-15 LAB
HCG URINE, QL. (POC): NEGATIVE
VALID INTERNAL CONTROL?: YES
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
## 2018-06-16 LAB
ERYTHROCYTE [DISTWIDTH] IN BLOOD BY AUTOMATED COUNT: 13.1 % (ref 12.3–15.4)
ESTRADIOL SERPL-MCNC: 31.2 PG/ML
FSH SERPL-ACNC: 53.2 MIU/ML
HCT VFR BLD AUTO: 36.8 % (ref 34–46.6)
HGB BLD-MCNC: 11.9 G/DL (ref 11.1–15.9)
LH SERPL-ACNC: 31.2 MIU/ML
MCH RBC QN AUTO: 28.3 PG (ref 26.6–33)
MCHC RBC AUTO-ENTMCNC: 32.3 G/DL (ref 31.5–35.7)
MCV RBC AUTO: 87 FL (ref 79–97)
PLATELET # BLD AUTO: 239 X10E3/UL (ref 150–379)
PROLACTIN SERPL-MCNC: 15.3 NG/ML (ref 4.8–23.3)
RBC # BLD AUTO: 4.21 X10E6/UL (ref 3.77–5.28)
TSH SERPL DL<=0.005 MIU/L-ACNC: 3.36 UIU/ML (ref 0.45–4.5)
WBC # BLD AUTO: 4.5 X10E3/UL (ref 3.4–10.8)

## 2018-06-18 LAB — BACTERIA UR CULT: NORMAL

## 2018-06-19 LAB
A VAGINAE DNA VAG QL NAA+PROBE: NORMAL SCORE
BVAB2 DNA VAG QL NAA+PROBE: NORMAL SCORE
C ALBICANS DNA VAG QL NAA+PROBE: NEGATIVE
C GLABRATA DNA VAG QL NAA+PROBE: NEGATIVE
C TRACH RRNA SPEC QL NAA+PROBE: NEGATIVE
CYTOLOGIST CVX/VAG CYTO: NORMAL
CYTOLOGY CVX/VAG DOC THIN PREP: NORMAL
DX ICD CODE: NORMAL
HPV I/H RISK 4 DNA CVX QL PROBE+SIG AMP: NEGATIVE
Lab: NORMAL
Lab: NORMAL
MEGA1 DNA VAG QL NAA+PROBE: NORMAL SCORE
N GONORRHOEA RRNA SPEC QL NAA+PROBE: NEGATIVE
OTHER STN SPEC: NORMAL
PATH REPORT.FINAL DX SPEC: NORMAL
STAT OF ADQ CVX/VAG CYTO-IMP: NORMAL
T VAGINALIS RRNA SPEC QL NAA+PROBE: NEGATIVE

## 2018-06-29 ENCOUNTER — OFFICE VISIT (OUTPATIENT)
Dept: INTERNAL MEDICINE CLINIC | Age: 43
End: 2018-06-29

## 2018-06-29 VITALS
SYSTOLIC BLOOD PRESSURE: 107 MMHG | BODY MASS INDEX: 25.91 KG/M2 | HEART RATE: 81 BPM | DIASTOLIC BLOOD PRESSURE: 72 MMHG | TEMPERATURE: 97.1 F | HEIGHT: 60 IN | RESPIRATION RATE: 16 BRPM | OXYGEN SATURATION: 97 % | WEIGHT: 132 LBS

## 2018-06-29 DIAGNOSIS — M79.7 FIBROMYALGIA SYNDROME: Primary | ICD-10-CM

## 2018-06-29 DIAGNOSIS — M25.561 CHRONIC PAIN OF BOTH KNEES: ICD-10-CM

## 2018-06-29 DIAGNOSIS — F33.41 RECURRENT MAJOR DEPRESSIVE DISORDER, IN PARTIAL REMISSION (HCC): ICD-10-CM

## 2018-06-29 DIAGNOSIS — G89.29 CHRONIC PAIN OF BOTH KNEES: ICD-10-CM

## 2018-06-29 DIAGNOSIS — M25.562 CHRONIC PAIN OF BOTH KNEES: ICD-10-CM

## 2018-06-29 DIAGNOSIS — M54.2 NECK PAIN: ICD-10-CM

## 2018-06-29 DIAGNOSIS — M25.561 ARTHRALGIA OF KNEE, RIGHT: ICD-10-CM

## 2018-06-29 DIAGNOSIS — G89.29 CHRONIC MIDLINE BACK PAIN, UNSPECIFIED BACK LOCATION: ICD-10-CM

## 2018-06-29 DIAGNOSIS — M54.9 CHRONIC MIDLINE BACK PAIN, UNSPECIFIED BACK LOCATION: ICD-10-CM

## 2018-06-29 DIAGNOSIS — R52 BODY ACHES: ICD-10-CM

## 2018-06-29 DIAGNOSIS — M25.50 ARTHRALGIA, UNSPECIFIED JOINT: ICD-10-CM

## 2018-06-29 DIAGNOSIS — B07.0 PLANTAR WART, LEFT FOOT: ICD-10-CM

## 2018-06-29 DIAGNOSIS — F43.10 PTSD (POST-TRAUMATIC STRESS DISORDER): ICD-10-CM

## 2018-06-29 DIAGNOSIS — M54.9 UPPER BACK PAIN, CHRONIC: ICD-10-CM

## 2018-06-29 DIAGNOSIS — K21.9 GASTROESOPHAGEAL REFLUX DISEASE WITHOUT ESOPHAGITIS: ICD-10-CM

## 2018-06-29 DIAGNOSIS — G89.29 UPPER BACK PAIN, CHRONIC: ICD-10-CM

## 2018-06-29 RX ORDER — DICLOFENAC SODIUM 75 MG/1
75 TABLET, DELAYED RELEASE ORAL 2 TIMES DAILY
Qty: 60 TAB | Refills: 1 | Status: SHIPPED | OUTPATIENT
Start: 2018-06-29 | End: 2018-10-23 | Stop reason: SDUPTHER

## 2018-06-29 RX ORDER — TRAZODONE HYDROCHLORIDE 50 MG/1
50 TABLET ORAL
Qty: 30 TAB | Refills: 5 | Status: SHIPPED | OUTPATIENT
Start: 2018-06-29 | End: 2018-10-23 | Stop reason: ALTCHOICE

## 2018-06-29 NOTE — MR AVS SNAPSHOT
[address] [facility] [address] [facility] 
[phone] Patient: Juan Ramon Stewart MRN: [id number] HNI:[id number] Visit Information Date & Time Provider Department Dept. Phone Encounter #  
 6/29/2018 10:15 AM Maxime Piyush [facility] Internal Medicine [phone] [id number] Follow-up Instructions Return in about 3 months (around 9/29/2018). Your Appointments 7/11/2018  2:00 PM  
PHYSICAL PRE OP with Hang Barnes MD  
DEPARTMENT [facility] [place], [place] OFFICE-ANNEX ([address] [place] [address]) Appt Note: Np, est pcp & discuss shoulder pain, $0cp $0pb, LMJ 04/30/2018  
 [address] [place] [address] [phone] [address] [facility] [address] 7/13/2018  8:00 AM  
ULTRASOUND with [facility] AT [address] [place] [address] ([address] [place] [address]) Appt Note: Pelvic Pain then [address], [place] [address] [facility] [address]  
[address], 2855 [facility] 5 [address]  
  
    
 7/13/2018  8:40 AM  
ESTABLISHED PATIENT with Reina Atkinson MD  
2220 [facility] ([address] [place] [address]) Appt Note: Pelvic pain  
 [address], [place] [address] [address]  
[address], [address]  
  
    
 8/7/2018  9:30 AM  
ACUTE CARE with Sam Martins DO [facility] Internal Medicine ([facility]) Appt Note: 3 mos f/u  
 200 [facility] [address] [facility] [address]  
[phone]  
  
   
 [address] [facility] [address] Upcoming Health Maintenance Date Due Influenza Age 5 to Adult 8/1/2018 PAP AKA CERVICAL CYTOLOGY 6/15/2021 DTaP/Tdap/Td series (2 - Td) 1/11/2028 Allergies as of 6/29/2018  Review Complete On: 6/29/2018 By: Sam Pair, DO No Known Allergies Current Immunizations  Never Reviewed Name Date Influenza Vaccine 1/11/2018 Tdap 1/11/2018 Not reviewed this visit You Were Diagnosed With   
  
 Codes Comments Fibromyalgia syndrome    -  Primary ICD-10-CM: M79.7 ICD-9-CM: 729.1 Chronic pain of both knees     ICD-10-CM: M25.561, M25.562, G89.29 ICD-9-CM: 719.46, 338.29 Arthralgia, unspecified joint     ICD-10-CM: M25.50 ICD-9-CM: 719.40 Plantar wart, left foot     ICD-10-CM: B07.0 ICD-9-CM: 078.12 Gastroesophageal reflux disease without esophagitis     ICD-10-CM: K21.9 ICD-9-CM: 530.81 PTSD (post-traumatic stress disorder)     ICD-10-CM: F43.10 ICD-9-CM: 309.81 Recurrent major depressive disorder, in partial remission ([facility] [address]     ICD-10-CM: F33.41 
ICD-9-CM: 296.35 Chronic midline back pain, unspecified back location     ICD-10-CM: M54.9, G89.29 ICD-9-CM: 724.5, 338.29 Upper back pain, chronic     ICD-10-CM: M54.9, G89.29 ICD-9-CM: 724.5, 338.29 Neck pain     ICD-10-CM: M54.2 ICD-9-CM: 723.1 Body aches     ICD-10-CM: R52 ICD-9-CM: 780.96 Arthralgia of knee, right     ICD-10-CM: M25.561 ICD-9-CM: 719.46 Vitals BP Pulse Temp Resp Height(growth percentile) Weight(growth percentile) 107/72 81 97.1 °F (36.2 °C) 16 5' (1.524 m) 132 lb (59.9 kg) LMP SpO2 BMI OB Status Smoking Status 06/06/2018 (Exact Date) 97% 25.78 kg/m2 Unknown Never Smoker BMI and BSA Data Body Mass Index Body Surface Area 25.78 kg/m 2 1.59 m 2 Preferred Pharmacy Pharmacy Name Phone [facility]/PHARMACY #[id number]- Roselia Mendoza, [address] [place] [address] [phone] Your Updated Medication List  
  
   
This list is accurate as of 6/29/18 10:38 AM.  Always use your most recent med list.  
  
  
  
  
 * diclofenac 1 % Gel Commonly known as:  VOLTAREN Apply  to affected area four (4) times daily. * diclofenac EC 75 mg EC tablet Commonly known as:  VOLTAREN Take 1 Tab by mouth two (2) times a day. DULoxetine 60 mg capsule Commonly known as:  CYMBALTA Take 1 Cap by mouth daily. Omeprazole delayed release 20 mg tablet Commonly known as:  PRILOSEC D/R Take 1 Tab by mouth daily. traZODone 50 mg tablet Commonly known as:  Gwendlyn Lean Take 1 Tab by mouth nightly. * Notice: This list has 2 medication(s) that are the same as other medications prescribed for you. Read the directions carefully, and ask your doctor or other care provider to review them with you. Prescriptions Sent to Pharmacy Refills  
 diclofenac EC (VOLTAREN) 75 mg EC tablet 1 Sig: Take 1 Tab by mouth two (2) times a day. Class: Normal  
 Pharmacy: [facility]/pharmacy #[id number] Ph #: [phone] Route: Oral  
 traZODone (DESYREL) 50 mg tablet 5 Sig: Take 1 Tab by mouth nightly. Class: Normal  
 Pharmacy: [facility]/pharmacy #[id number] Ph #: [phone] Route: Oral  
  
We Performed the Following REFERRAL TO PODIATRY [REF90 Custom] Follow-up Instructions Return in about 3 months (around 9/29/2018). Referral Information Referral ID Referred By Referred To  
  
 [id number] [address], [place] [address] Radha Garza [address] [place] [address] Visits Status Start Date End Date 1 New Request 6/29/18 6/29/19 If your referral has a status of pending review or denied, additional information will be sent to support the outcome of this decision. Introducing \[facility]\"" & HEALTH SERVICES! [facility] introduces [employer] patient portal. Now you can access parts of your medical record, email your doctor's office, and request medication refills online. 1. In your internet browser, go to [email or web address] 2. Click on the First Time User? Click Here link in the Sign In box. You will see the New Member Sign Up page. 3. Enter your SwitchNote Access Code exactly as it appears below. You will not need to use this code after youve completed the sign-up process. If you do not sign up before the expiration date, you must request a new code. · SwitchNote Access Code: [email or web address] Expires: 7/30/2018 10:27 AM 
 
4. Enter the last four digits of your Social Security Number (xxxx) and Date of Birth (mm/dd/yyyy) as indicated and click Submit. You will be taken to the next sign-up page. 5. Create a SwitchNote ID. This will be your SwitchNote login ID and cannot be changed, so think of one that is secure and easy to remember. 6. Create a SwitchNote password. You can change your password at any time. 7. Enter your Password Reset Question and Answer. This can be used at a later time if you forget your password. 8. Enter your e-mail address. You will receive e-mail notification when new information is available in [address] 9. Click Sign Up. You can now view and download portions of your medical record. 10. Click the Download Summary menu link to download a portable copy of your medical information. If you have questions, please visit the Frequently Asked Questions section of the SwitchNote website. Remember, SwitchNote is NOT to be used for urgent needs. For medical emergencies, dial 911. Now available from your iPhone and Android! Please provide this summary of care documentation to your next provider. Your primary care clinician is listed as Domenico Alvarado. If you have any questions after today's visit, please call [phone].

## 2018-06-29 NOTE — PROGRESS NOTES
Chief Complaint   Patient presents with    Knee Pain     Patient speaks no [place], Physician is translating for her.

## 2018-06-30 NOTE — PROGRESS NOTES
HISTORY OF PRESENT ILLNESS  Nikita Negro is a 39 y.o. female. She is back for follow-up. Has multiple complaints. Reports having continued pain in hands, legs, knees, neck, muscles. Medications are helping some. Tells me cortisone shot in the knee did not help much. Having problems with her feet. Has a left plantar wart. Complains about her job requiring standing all day long. Trying to find a new job. Came here with her children as refugee from [place] a few months ago. History of depression/PTSD/anxiety/ insomnia. As tobacco or alcohol use. Med list the most recent studies reviewed. Rheumatological studies were negative. X-rays of joints done by previous PCP reported as mild DJD. Knee Pain   Pertinent negatives include no chest pain, no abdominal pain, no headaches and no shortness of breath. Hand Pain    Associated symptoms include back pain and neck pain. Review of Systems   Constitutional: Negative. HENT: Negative. Eyes: Negative. Respiratory: Negative for shortness of breath. Cardiovascular: Negative for chest pain and leg swelling. Gastrointestinal: Positive for heartburn. Negative for abdominal pain. Genitourinary: Negative for dysuria and frequency. Musculoskeletal: Positive for back pain, joint pain, myalgias and neck pain. Negative for falls. Skin: Negative. Neurological: Negative for dizziness, sensory change, focal weakness and headaches. Psychiatric/Behavioral: Positive for depression. The patient is nervous/anxious and has insomnia. All other systems reviewed and are negative. Physical Exam   Constitutional: She is oriented to person, place, and time. She appears well-developed and well-nourished. No distress. HENT:   Head: Normocephalic and atraumatic. Mouth/Throat: Oropharynx is clear and moist.   Eyes: Conjunctivae are normal.   Neck: Normal range of motion. Neck supple. No JVD present. No thyromegaly present.    Cardiovascular: Normal rate, regular rhythm, normal heart sounds and intact distal pulses. No murmur heard. Pulmonary/Chest: Effort normal and breath sounds normal. No respiratory distress. She has no wheezes. She has no rales. Abdominal: Soft. Bowel sounds are normal. She exhibits no distension. Musculoskeletal: She exhibits tenderness (Bilateral knees, lumbars,shoudlers,feet). She exhibits no edema. DJD   Neurological: She is alert and oriented to person, place, and time. Coordination normal.   Skin: Skin is warm and dry. No rash noted. Left plantar wart   Psychiatric: Her behavior is normal.   Depressed/stressed   Nursing note and vitals reviewed. ASSESSMENT and PLAN  Diagnoses and all orders for this visit:    1. Fibromyalgia syndrome    2. Chronic pain of both knees  -     diclofenac EC (VOLTAREN) 75 mg EC tablet; Take 1 Tab by mouth two (2) times a day. 3. Arthralgia, unspecified joint    4. Plantar wart, left foot  -     REFERRAL TO PODIATRY    5. Gastroesophageal reflux disease without esophagitis    6. PTSD (post-traumatic stress disorder)    7. Recurrent major depressive disorder, in partial remission ([facility] [address]    8. Chronic midline back pain, unspecified back location  -     diclofenac EC (VOLTAREN) 75 mg EC tablet; Take 1 Tab by mouth two (2) times a day. 9. Upper back pain, chronic  -     diclofenac EC (VOLTAREN) 75 mg EC tablet; Take 1 Tab by mouth two (2) times a day. 10. Neck pain  -     diclofenac EC (VOLTAREN) 75 mg EC tablet; Take 1 Tab by mouth two (2) times a day. 11. Body aches  -     diclofenac EC (VOLTAREN) 75 mg EC tablet; Take 1 Tab by mouth two (2) times a day. 12. Arthralgia of knee, right  -     diclofenac EC (VOLTAREN) 75 mg EC tablet; Take 1 Tab by mouth two (2) times a day. Other orders  -     traZODone (DESYREL) 50 mg tablet; Take 1 Tab by mouth nightly. Follow-up Disposition:  Return in about 3 months (around 9/29/2018).    lab results and schedule of future lab studies reviewed with patient  reviewed diet, exercise and weight control  reviewed medications and side effects in detail  F/u with other MD's as scheduled  Encourage patient to find a new job which is easier for her  Advised patient to follow-up with psychiatrist since she has missed some visits according to her   Advised patient to learn English so she can succeed in this country  Overall she seems unhappy saying that she expected more from the government. I told her that she needs to become independent. Also advised her to reach out to her Orthodoxy or Sabianism for help.

## 2018-07-13 ENCOUNTER — OFFICE VISIT (OUTPATIENT)
Dept: OBGYN CLINIC | Age: 43
End: 2018-07-13

## 2018-07-13 VITALS
DIASTOLIC BLOOD PRESSURE: 72 MMHG | BODY MASS INDEX: 25.72 KG/M2 | WEIGHT: 131 LBS | HEIGHT: 60 IN | SYSTOLIC BLOOD PRESSURE: 108 MMHG

## 2018-07-13 DIAGNOSIS — K59.00 CONSTIPATION, UNSPECIFIED CONSTIPATION TYPE: ICD-10-CM

## 2018-07-13 DIAGNOSIS — R10.2 PELVIC PAIN: Primary | ICD-10-CM

## 2018-07-13 NOTE — MR AVS SNAPSHOT
[address] [place] [address] [facility] 
[phone] Patient: Kayla Bell MRN: [id number] RPU:[id number]/[id number] Visit Information Date & Time Provider Department Dept. Phone Encounter #  
 7/13/2018  8:40 AM Angus Espinoza MD 2220 [facility] [phone] [id number] Your Appointments 7/13/2018  8:40 AM  
ESTABLISHED PATIENT with Angus Espinoza MD  
2220 [facility] ([facility]) Appt Note: Pelvic pain  
 [address] [facility] [address]  
[address] Erin Nunez [address]   
  
    
 9/28/2018  9:45 AM  
ROUTINE CARE with Lake Craig DO [facility] Internal Medicine ([facility]) Appt Note: 3 mos f/u; 3 month f/u  
 200 [facility] [address] 1400 [facility] [address]  
[phone]  
  
   
 1787 [facility] [address] Upcoming Health Maintenance Date Due Influenza Age 5 to Adult 8/1/2018 PAP AKA CERVICAL CYTOLOGY 6/15/2021 Allergies as of 7/13/2018  Review Complete On: 7/13/2018 By: Frida Farnsworth No Known Allergies Current Immunizations  Never Reviewed Name Date Influenza Vaccine 1/11/2018 Tdap 1/11/2018 Not reviewed this visit Vitals BP Height(growth percentile) Weight(growth percentile) LMP BMI OB Status 108/72 (BP 1 Location: Right arm, BP Patient Position: Sitting) 5' (1.524 m) 131 lb (59.4 kg) 07/06/2018 (Exact Date) 25.58 kg/m2 Unknown Smoking Status Never Smoker BMI and BSA Data Body Mass Index Body Surface Area 25.58 kg/m 2 1.59 m 2 Preferred Pharmacy Pharmacy Name Phone [id number] [place] [facility], [address] [facility] [phone] Your Updated Medication List  
  
   
This list is accurate as of 7/13/18  8:30 AM.  Always use your most recent med list.  
 * diclofenac 1 % Gel Commonly known as:  VOLTAREN Apply  to affected area four (4) times daily. * diclofenac EC 75 mg EC tablet Commonly known as:  VOLTAREN Take 1 Tab by mouth two (2) times a day. DULoxetine 60 mg capsule Commonly known as:  CYMBALTA Take 1 Cap by mouth daily. Omeprazole delayed release 20 mg tablet Commonly known as:  PRILOSEC D/R Take 1 Tab by mouth daily. traZODone 50 mg tablet Commonly known as:  Berngerardce Staggers Take 1 Tab by mouth nightly. * Notice: This list has 2 medication(s) that are the same as other medications prescribed for you. Read the directions carefully, and ask your doctor or other care provider to review them with you. Patient Instructions Functional Ovarian Cyst: Care Instructions Your Care Instructions A functional ovarian cyst is a sac that forms on the surface of a woman's ovary during ovulation. The sac holds a maturing egg. Usually the sac goes away after the egg is released. But if the egg is not released, or if the sac closes up after the egg is released, the sac can swell up with fluid and form a cyst. 
Functional ovarian cysts are different than ovarian growths caused by other problems, such as cancer. Most functional ovarian cysts cause no symptoms and go away on their own. Some cause mild pain. Others can cause severe pain when they rupture or bleed. Follow-up care is a key part of your treatment and safety. Be sure to make and go to all appointments, and call your doctor if you are having problems. It's also a good idea to know your test results and keep a list of the medicines you take. How can you care for yourself at home? · Use heat, such as a hot water bottle, a heating pad set on low, or a warm bath, to relax tense muscles and relieve cramping. · Be safe with medicines. Take pain medicines exactly as directed. ¨ If the doctor gave you a prescription medicine for pain, take it as prescribed. ¨ If you are not taking a prescription pain medicine, ask your doctor if you can take an over-the-counter medicine. · Avoid constipation. Make sure you drink enough fluids and include fruits, vegetables, and fiber in your diet each day. Constipation does not cause ovarian cysts, but it may make your pelvic pain worse. When should you call for help? Call your doctor now or seek immediate medical care if: 
  · You have severe vaginal bleeding.  
  · You have new or worse belly or pelvic pain.  
 Watch closely for changes in your health, and be sure to contact your doctor if: 
  · You have unusual vaginal bleeding.  
  · You do not get better as expected. Where can you learn more? Go to http://gisel-melani.info/. Enter U829 in the search box to learn more about \"Functional Ovarian Cyst: Care Instructions. \" Current as of: October 6, 2017 Content Version: 11.7 © 1050-3383 [employer]. Care instructions adapted under license by [employer] (which disclaims liability or warranty for this information). If you have questions about a medical condition or this instruction, always ask your healthcare professional. [address] any warranty or liability for your use of this information. Introducing [facility] & HEALTH SERVICES! [facility] introduces [employer] patient portal. Now you can access parts of your medical record, email your doctor's office, and request medication refills online. 1. In your internet browser, go to [email or web address] 2. Click on the First Time User? Click Here link in the Sign In box. You will see the New Member Sign Up page. 3. Enter your [employer] Access Code exactly as it appears below. You will not need to use this code after youve completed the sign-up process.  If you do not sign up before the expiration date, you must request a new code. · [email or web address] Access Code: [email or web address] Expires: 7/30/2018 10:27 AM 
 
4. Enter the last four digits of your Social Security Number (xxxx) and Date of Birth (mm/dd/yyyy) as indicated and click Submit. You will be taken to the next sign-up page. 5. Create a [email or web address] ID. This will be your [email or web address] login ID and cannot be changed, so think of one that is secure and easy to remember. 6. Create a [email or web address] password. You can change your password at any time. 7. Enter your Password Reset Question and Answer. This can be used at a later time if you forget your password. 8. Enter your e-mail address. You will receive e-mail notification when new information is available in [address] 9. Click Sign Up. You can now view and download portions of your medical record. 10. Click the Download Summary menu link to download a portable copy of your medical information. If you have questions, please visit the Frequently Asked Questions section of the [email or web address] website. Remember, [email or web address] is NOT to be used for urgent needs. For medical emergencies, dial 911. Now available from your iPhone and Android! Please provide this summary of care documentation to your next provider. Your primary care clinician is listed as Pavel Yo. If you have any questions after today's visit, please call [phone].

## 2018-07-13 NOTE — PATIENT INSTRUCTIONS
Functional Ovarian Cyst: Care Instructions  Your Care Instructions    A functional ovarian cyst is a sac that forms on the surface of a woman's ovary during ovulation. The sac holds a maturing egg. Usually the sac goes away after the egg is released. But if the egg is not released, or if the sac closes up after the egg is released, the sac can swell up with fluid and form a cyst.  Functional ovarian cysts are different than ovarian growths caused by other problems, such as cancer. Most functional ovarian cysts cause no symptoms and go away on their own. Some cause mild pain. Others can cause severe pain when they rupture or bleed. Follow-up care is a key part of your treatment and safety. Be sure to make and go to all appointments, and call your doctor if you are having problems. It's also a good idea to know your test results and keep a list of the medicines you take. How can you care for yourself at home? · Use heat, such as a hot water bottle, a heating pad set on low, or a warm bath, to relax tense muscles and relieve cramping. · Be safe with medicines. Take pain medicines exactly as directed. ¨ If the doctor gave you a prescription medicine for pain, take it as prescribed. ¨ If you are not taking a prescription pain medicine, ask your doctor if you can take an over-the-counter medicine. · Avoid constipation. Make sure you drink enough fluids and include fruits, vegetables, and fiber in your diet each day. Constipation does not cause ovarian cysts, but it may make your pelvic pain worse. When should you call for help? Call your doctor now or seek immediate medical care if:    · You have severe vaginal bleeding.     · You have new or worse belly or pelvic pain.    Watch closely for changes in your health, and be sure to contact your doctor if:    · You have unusual vaginal bleeding.     · You do not get better as expected. Where can you learn more?   Go to http://gisel-melani.info/. Enter H626 in the search box to learn more about \"Functional Ovarian Cyst: Care Instructions. \"  Current as of: October 6, 2017  Content Version: 11.7  © 6099-1041 [employer]. Care instructions adapted under license by [employer] (which disclaims liability or warranty for this information). If you have questions about a medical condition or this instruction, always ask your healthcare professional. [place] [address] any warranty or liability for your use of this information.

## 2018-07-13 NOTE — PROGRESS NOTES
Pelvic Pain    [facility]  used for visit. #[id number]     Amy Lipscomb is a 39 y.o.  A2 presenting for ultrasound and follow up of pelvic pain. Her main concerns today include AUB and irregular menses for 1 year (previously regular cycles). Pt c/o pelvic pain x 2 years, predominantly in RLQ. Pt was evaluated previously in [place] and was told that she had \"injury to her uterus\". Also reports she had ultrasound and was started on \"a medication\" which temporarily improved her bleeding. +significant constipation. Denies dysuria, fevers/chills. Occasional white vaginal discharge with an odor. Denies itching or irritation.  and daughter were tortured and executed. Brother was killed by a suicide bomber. Labs from prior visit:  Hgb 11.9  Thyroid fxn wnl - TSH 3.36  Prolactin wnl - 15.3  FSH/LH/estradiol - c/w perimenopause vs. Menopausal status (likely transitioning)  No evidence of UTI  Pap NILM HPV neg, repeat 5 years  Nuswab plus neg    Ultrasound performed today in the office:  THE UTERUS IS ANTEVERTED, NORMAL IN SIZE AND ECHOGENICITY. THE ENDOMETRIUM MEASURES 6MM IN THICKNESS. NO MASSES OR ABNORMALITIES ARE SEEN. RIGHT OVARY APPEARS ENLARGED WITH A SIMPLE CYST MEASURING 30 X 32 X 32 MM. LEFT OVARY APPEARS WNL. NO FREE FLUID IS SEEN IN THE CDS. Ob/Gyn Hx:   A2 - 5 vaginal deliveries, 2 SAB  LMP- 18  Menses- irregular x 1 year, as per HPI  Contraception- none  STI- denies  ? SA- no    Health maintenance:  Pap- 6/15/18 NILM HPV Neg  Mammo- never  Colonoscopy- not indicated    Past Medical History:   Diagnosis Date    Arthritis     Chronic pain     Depression        History reviewed. No pertinent surgical history.     Family History   Problem Relation Age of Onset    No Known Problems Mother     No Known Problems Father      Social History     Social History    Marital status:      Spouse name: N/A    Number of children: N/A    Years of education: N/A     Social History Main Topics    Smoking status: Never Smoker    Smokeless tobacco: Never Used    Alcohol use No    Drug use: No    Sexual activity: No      Comment: has 4 children     Other Topics Concern    None     Social History Narrative    From [facility], speaks [place], no [place].  and daughter were tortured and executed. Brother was killed by suicide bomber. Now lives here with her 4 other kids. Single income, so financial resources are tight. Current Outpatient Prescriptions   Medication Sig Dispense Refill    diclofenac EC (VOLTAREN) 75 mg EC tablet Take 1 Tab by mouth two (2) times a day. 60 Tab 1    traZODone (DESYREL) 50 mg tablet Take 1 Tab by mouth nightly. 30 Tab 5    DULoxetine (CYMBALTA) 60 mg capsule Take 1 Cap by mouth daily. 30 Cap 5    Omeprazole delayed release (PRILOSEC D/R) 20 mg tablet Take 1 Tab by mouth daily. 30 Tab 5    diclofenac (VOLTAREN) 1 % gel Apply  to affected area four (4) times daily.  100 g 5       No Known Allergies    Review of Systems - History obtained from the patient  Constitutional: negative for weight loss, fever, night sweats  HEENT: negative for hearing loss, earache, congestion, snoring, sorethroat  CV: negative for chest pain, palpitations, edema  Resp: negative for cough, shortness of breath, wheezing  GI: negative for change in bowel habits, abdominal pain, black or bloody stools  : negative for frequency, dysuria, hematuria, vaginal discharge, +irregular menses x1 year, right flank pain x 2 years (at prior visit pt reported it had only been present for 1 week)  MSK: negative for back pain, joint pain, muscle pain  Breast: negative for breast lumps, nipple discharge, galactorrhea  Skin :negative for itching, rash, hives  Neuro: negative for dizziness, headache, confusion, weakness  Psych: negative for anxiety, depression, change in mood  Heme/lymph: negative for bleeding, bruising, pallor    Physical Exam    Visit Vitals    BP 108/72 (BP 1 Location: Right arm, BP Patient Position: Sitting)    Ht 5' (1.524 m)    Wt 131 lb (59.4 kg)    LMP 2018 (Exact Date)    BMI 25.58 kg/m2     Constitutional  · Appearance: well-nourished, well developed, alert, in no acute distress    HENT  · Head and Face: appears normal    Chest  · Respiratory Effort: non-labored breathing  ·   Cardiovascular  · Extremities: no peripheral edema    Gastrointestinal  · Abdominal Examination: abdomen non-tender to palpation, normal bowel sounds, no masses present, no CVAT  · Liver and spleen: no hepatomegaly present, spleen not palpable  · Hernias: no hernias identified    Genitourinary: deferred today as examined at last visit    Skin  · General Inspection: no rash, no lesions identified    Neurologic/Psychiatric  · Mental Status:  · Orientation: grossly oriented to person, place and time  · Mood and Affect: mood normal, flat affect    No results found for this or any previous visit (from the past 12 hour(s)). Assessment/Plan:  39 y.o. [id number] perimenopausal female presenting for evaluation of AUB, pelvic pain. AUB: suspect perimenopausal bleeding, irregular, but not heavy  -menstrual calendar  -reviewed ultrasound results today  -reviewed lab results (CBC, TSH, PRL, LH, FSH, estradiol) with pt today  -declines hormonal contraceptive options for cycle control  -consider EMBx in future if bleeding becomes heavier or more frequent    Right flank pain/pelvic pain: urine cx neg, pelvic US showing small simple physiologic 3cm ROV cyst, +constipation. No worsening of pain with menses.   -repeat US in 2 months to confirm cyst resolution  -reviewed bowel regimen, Rx for docusate   -referral to GI to r/o gastrointestinal sources of pain, as 3cm cyst likely physiologic, and unlikely to be contributing to pain that has persistent for the past 2 years  -consider pelvic PT in future  -declines hormonal contraception for regulation of cycles  -tylenol/nsaids prn discomfort  -reviewed pain/fever precautions and reasons to go to ER    RTC: 2 months, or sooner abrahan Downs MD  7/13/2018  9:05 AM

## 2018-10-09 ENCOUNTER — OFFICE VISIT (OUTPATIENT)
Dept: OBGYN CLINIC | Age: 43
End: 2018-10-09

## 2018-10-09 VITALS
SYSTOLIC BLOOD PRESSURE: 104 MMHG | BODY MASS INDEX: 26.58 KG/M2 | DIASTOLIC BLOOD PRESSURE: 68 MMHG | HEIGHT: 60 IN | WEIGHT: 135.4 LBS

## 2018-10-09 DIAGNOSIS — R35.0 FREQUENCY OF URINATION: Primary | ICD-10-CM

## 2018-10-09 DIAGNOSIS — N83.201 CYST OF RIGHT OVARY: ICD-10-CM

## 2018-10-09 LAB
BILIRUB UR QL STRIP: NEGATIVE
GLUCOSE UR-MCNC: NEGATIVE MG/DL
KETONES P FAST UR STRIP-MCNC: NEGATIVE MG/DL
PH UR STRIP: 6 [PH] (ref 4.6–8)
PROT UR QL STRIP: NEGATIVE
SP GR UR STRIP: 1.01 (ref 1–1.03)
UA UROBILINOGEN AMB POC: NORMAL (ref 0.2–1)
URINALYSIS CLARITY POC: CLEAR
URINALYSIS COLOR POC: NORMAL
URINE BLOOD POC: NEGATIVE
URINE LEUKOCYTES POC: NEGATIVE
URINE NITRITES POC: NEGATIVE

## 2018-10-09 NOTE — PATIENT INSTRUCTIONS
Functional Ovarian Cyst: Care Instructions  Your Care Instructions    A functional ovarian cyst is a sac that forms on the surface of a woman's ovary during ovulation. The sac holds a maturing egg. Usually the sac goes away after the egg is released. But if the egg is not released, or if the sac closes up after the egg is released, the sac can swell up with fluid and form a cyst.  Functional ovarian cysts are different than ovarian growths caused by other problems, such as cancer. Most functional ovarian cysts cause no symptoms and go away on their own. Some cause mild pain. Others can cause severe pain when they rupture or bleed. Follow-up care is a key part of your treatment and safety. Be sure to make and go to all appointments, and call your doctor if you are having problems. It's also a good idea to know your test results and keep a list of the medicines you take. How can you care for yourself at home? · Use heat, such as a hot water bottle, a heating pad set on low, or a warm bath, to relax tense muscles and relieve cramping. · Be safe with medicines. Take pain medicines exactly as directed. ¨ If the doctor gave you a prescription medicine for pain, take it as prescribed. ¨ If you are not taking a prescription pain medicine, ask your doctor if you can take an over-the-counter medicine. · Avoid constipation. Make sure you drink enough fluids and include fruits, vegetables, and fiber in your diet each day. Constipation does not cause ovarian cysts, but it may make your pelvic pain worse. When should you call for help? Call your doctor now or seek immediate medical care if:    · You have severe vaginal bleeding.     · You have new or worse belly or pelvic pain.    Watch closely for changes in your health, and be sure to contact your doctor if:    · You have unusual vaginal bleeding.     · You do not get better as expected. Where can you learn more?   Go to http://gisel-melani.info/. Enter Q592 in the search box to learn more about \"Functional Ovarian Cyst: Care Instructions. \"  Current as of: May 15, 2018  Content Version: 11.8  © 4344-8354 [employer]. Care instructions adapted under license by [employer] (which disclaims liability or warranty for this information). If you have questions about a medical condition or this instruction, always ask your healthcare professional. [address] any warranty or liability for your use of this information.

## 2018-10-09 NOTE — PROGRESS NOTES
Pelvic Pain    [facility]  used for visit. #[id number]    Kevin Morales is a 39 y.o.  A2 perimenopausal female presenting for ultrasound and follow up of pelvic pain and AUB. Previously with 3cm ROV cyst on ultrasound, which has now resolved. Remainder of gynecologic structures normal appearing. At prior visit, pt reported AUB and irregular menses for 1 year (previously regular cycles), but since her last visit she has had regular monthly cycles. Denies heavy flow or passage of clots. Pelvic pain present x2 years, predominantly in RLQ. Pt was evaluated previously in [place] and was told that she had \"injury to her uterus\". Also reports she had ultrasound and was started on \"a medication\" which temporarily improved her bleeding. +significant constipation. Also c/o urinary frequency today. Urine dip wnl. Denies fevers/chills. Occasional white vaginal discharge with an odor. Denies itching or irritatio, no current symptoms.  and daughter were tortured and executed. Brother was killed by a suicide bomber. C/o urinary frequency today. Has had a menses at the end of each month since her last office visit in July. Pelvic pain is unchanged. Labs from prior visit:  Hgb 11.9  Thyroid fxn wnl - TSH 3.36  Prolactin wnl - 15.3  FSH/LH/estradiol - c/w perimenopause vs. Menopausal status (likely transitioning)  No evidence of UTI  Pap NILM HPV neg, repeat 5 years  Nuswab plus neg    TVUS 10/9/18:  THE UTERUS IS ANTEVERTED, NORMAL IN SIZE AND ECHOGENICITY. THE ENDOMETRIUM MEASURES 7.5MM IN THICKNESS. THERE APPEARS TO BE A SMALL TRACE OF FLUID SEEN  WITHIN THE ENDOMETRIAL LINING. RIGHT OVARY APPEARS WNL. LEFT OVARY NOT SEEN DUE TO BOWEL GAS. NO FREE FLUID IS SEEN IN THE CDS. Ultrasound performed 18:  THE UTERUS IS ANTEVERTED, NORMAL IN SIZE AND ECHOGENICITY. THE ENDOMETRIUM MEASURES 6MM IN THICKNESS. NO MASSES OR ABNORMALITIES ARE SEEN.   RIGHT OVARY APPEARS ENLARGED WITH A SIMPLE CYST MEASURING 30 X 32 X 32 MM. LEFT OVARY APPEARS WNL. NO FREE FLUID IS SEEN IN THE CDS. Ob/Gyn Hx:   A2 - 5 vaginal deliveries, 2 SAB  LMP- end of 2018  Menses- irregular x 1 year, as per HPI  Contraception- none  STI- denies  ? SA- no    Health maintenance:  Pap- 6/15/18 NILM HPV Neg  Mammo- never    Past Medical History:   Diagnosis Date    Arthritis     Chronic pain     Constipation     Depression     Ovarian cyst     Pelvic pain        No past surgical history on file. Family History   Problem Relation Age of Onset    No Known Problems Mother     No Known Problems Father      Social History     Social History    Marital status:      Spouse name: N/A    Number of children: N/A    Years of education: N/A     Social History Main Topics    Smoking status: Never Smoker    Smokeless tobacco: Never Used    Alcohol use No    Drug use: No    Sexual activity: No      Comment: has 4 children     Other Topics Concern    Not on file     Social History Narrative    From [facility], speaks [place], no [place].  and daughter were tortured and executed. Brother was killed by suicide bomber. Now lives here with her 4 other kids. Single income, so financial resources are tight. Current Outpatient Prescriptions   Medication Sig Dispense Refill    docusate sodium (COLACE) 50 mg capsule Take 1 Cap by mouth two (2) times a day for 90 days. 60 Cap 2    diclofenac EC (VOLTAREN) 75 mg EC tablet Take 1 Tab by mouth two (2) times a day. 60 Tab 1    traZODone (DESYREL) 50 mg tablet Take 1 Tab by mouth nightly. 30 Tab 5    DULoxetine (CYMBALTA) 60 mg capsule Take 1 Cap by mouth daily. 30 Cap 5    Omeprazole delayed release (PRILOSEC D/R) 20 mg tablet Take 1 Tab by mouth daily. 30 Tab 5    diclofenac (VOLTAREN) 1 % gel Apply  to affected area four (4) times daily.  100 g 5       No Known Allergies    Review of Systems - History obtained from the patient  Constitutional: negative for weight loss, fever, night sweats  HEENT: negative for hearing loss, earache, congestion, snoring, sorethroat  CV: negative for chest pain, palpitations, edema  Resp: negative for cough, shortness of breath, wheezing  GI: negative for change in bowel habits, abdominal pain, black or bloody stools  : negative for frequency, dysuria, hematuria, vaginal discharge, +irregular menses x1 year, right flank pain x 2 years  MSK: negative for back pain, joint pain, muscle pain  Breast: negative for breast lumps, nipple discharge, galactorrhea  Skin :negative for itching, rash, hives  Neuro: negative for dizziness, headache, confusion, weakness  Psych: negative for anxiety, depression, change in mood  Heme/lymph: negative for bleeding, bruising, pallor    Physical Exam  Visit Vitals    /68 (BP 1 Location: Left arm, BP Patient Position: Sitting)    Ht 5' (1.524 m)    Wt 135 lb 6.4 oz (61.4 kg)    BMI 26.44 kg/m2     Constitutional  · Appearance: well-nourished, well developed, alert, in no acute distress    HENT  · Head and Face: appears normal    Chest  · Respiratory Effort: non-labored breathing  ·   Cardiovascular  · Extremities: no peripheral edema    Gastrointestinal  · Abdominal Examination: abdomen non-tender to palpation, normal bowel sounds, no masses present, no CVAT  · Liver and spleen: no hepatomegaly present, spleen not palpable  · Hernias: no hernias identified    Genitourinary: deferred today as examined at last visit    Skin  · General Inspection: no rash, no lesions identified    Neurologic/Psychiatric  · Mental Status:  · Orientation: grossly oriented to person, place and time  · Mood and Affect: mood normal, flat affect    Recent Results (from the past 12 hour(s))   AMB POC URINALYSIS DIP STICK AUTO W/O MICRO    Collection Time: 10/09/18 10:36 AM   Result Value Ref Range    Color (UA POC) Light Yellow     Clarity (UA POC) Clear     Glucose (UA POC) Negative Negative    Bilirubin (UA POC) Negative Negative    Ketones (UA POC) Negative Negative    Specific gravity (UA POC) 1.010 1.001 - 1.035    Blood (UA POC) Negative Negative    pH (UA POC) 6.0 4.6 - 8.0    Protein (UA POC) Negative Negative    Urobilinogen (UA POC) normal 0.2 - 1    Nitrites (UA POC) Negative Negative    Leukocyte esterase (UA POC) Negative Negative       Assessment/Plan:  39 y.o. [id number] perimenopausal female presenting for evaluation of AUB, pelvic pain. AUB: suspect perimenopausal bleeding, irregular, but not heavy  -menstrual calendar  -reviewed ultrasound results today  -reviewed lab results (CBC, TSH, PRL, LH, FSH, estradiol) from prior visit  -declines hormonal contraceptive options for cycle control  -consider EMBx in future if bleeding becomes heavier or more frequent    Urinary frequency: urine dip wnl today, urine cx neg at prior visit  -referral to urology provided for further workup    Right flank pain/pelvic pain: previously visualized 3cm ROV cyst resolved, +constipation. No worsening of pain with menses.   -reviewed ultrasound findings with patient today  -reviewed bowel regimen, Rx for docusate   -referral to GI to r/o gastrointestinal sources of pain (reports referral provided at last visit doesn't accept medicaid)  -consider pelvic PT in future  -tylenol/nsaids prn discomfort  -reviewed pain/fever precautions and reasons to go to ER    RTC: for AE or sooner prn if AUB sx or pain sx worsen    Misty Tapia MD  10/9/2018  10:47 AM

## 2018-10-23 ENCOUNTER — OFFICE VISIT (OUTPATIENT)
Dept: INTERNAL MEDICINE CLINIC | Age: 43
End: 2018-10-23

## 2018-10-23 VITALS
BODY MASS INDEX: 26.58 KG/M2 | OXYGEN SATURATION: 95 % | RESPIRATION RATE: 16 BRPM | HEIGHT: 60 IN | SYSTOLIC BLOOD PRESSURE: 105 MMHG | WEIGHT: 135.4 LBS | DIASTOLIC BLOOD PRESSURE: 69 MMHG | HEART RATE: 80 BPM | TEMPERATURE: 97.8 F

## 2018-10-23 DIAGNOSIS — M25.562 CHRONIC PAIN OF BOTH KNEES: ICD-10-CM

## 2018-10-23 DIAGNOSIS — G89.29 UPPER BACK PAIN, CHRONIC: ICD-10-CM

## 2018-10-23 DIAGNOSIS — R52 BODY ACHES: ICD-10-CM

## 2018-10-23 DIAGNOSIS — K21.9 GASTROESOPHAGEAL REFLUX DISEASE WITHOUT ESOPHAGITIS: Primary | ICD-10-CM

## 2018-10-23 DIAGNOSIS — M54.2 NECK PAIN: ICD-10-CM

## 2018-10-23 DIAGNOSIS — Z12.31 ENCOUNTER FOR SCREENING MAMMOGRAM FOR BREAST CANCER: ICD-10-CM

## 2018-10-23 DIAGNOSIS — G89.29 CHRONIC MIDLINE BACK PAIN, UNSPECIFIED BACK LOCATION: ICD-10-CM

## 2018-10-23 DIAGNOSIS — M54.9 CHRONIC MIDLINE BACK PAIN, UNSPECIFIED BACK LOCATION: ICD-10-CM

## 2018-10-23 DIAGNOSIS — M54.9 UPPER BACK PAIN, CHRONIC: ICD-10-CM

## 2018-10-23 DIAGNOSIS — M25.561 CHRONIC PAIN OF BOTH KNEES: ICD-10-CM

## 2018-10-23 DIAGNOSIS — M25.561 ARTHRALGIA OF KNEE, RIGHT: ICD-10-CM

## 2018-10-23 DIAGNOSIS — Z23 ENCOUNTER FOR IMMUNIZATION: ICD-10-CM

## 2018-10-23 DIAGNOSIS — G89.29 CHRONIC PAIN OF BOTH KNEES: ICD-10-CM

## 2018-10-23 RX ORDER — DICLOFENAC SODIUM 75 MG/1
75 TABLET, DELAYED RELEASE ORAL 2 TIMES DAILY
Qty: 60 TAB | Refills: 1 | Status: SHIPPED | OUTPATIENT
Start: 2018-10-23 | End: 2019-11-15 | Stop reason: ALTCHOICE

## 2018-10-23 RX ORDER — PANTOPRAZOLE SODIUM 40 MG/1
40 TABLET, DELAYED RELEASE ORAL DAILY
Qty: 30 TAB | Refills: 5 | Status: SHIPPED | OUTPATIENT
Start: 2018-10-23 | End: 2019-02-12 | Stop reason: SDUPTHER

## 2018-10-23 NOTE — LETTER
NOTIFICATION RETURN TO WORK / SCHOOL 
 
10/23/2018 12:06 PM 
 
Ms. Manjinder Gautam [address] [address] To Whom It May Concern: 
 
Manjinder Gautam is currently under the care of [address]. She can go to [facility] and take her 's license test.  
 
If there are questions or concerns please have the patient contact our office.  
 
 
 
Sincerely, 
 
 
Antonio Valente, DO

## 2018-10-23 NOTE — PROGRESS NOTES
Charles Macias is a 39 y.o. female Chief Complaint Patient presents with  Knee Pain B  
 GERD  Numbness  
  feet  Insomnia  Follow Up Chronic Condition Administered 0.5 ml of Quadrivalent influenza vaccine into right deltoid. Patient tolerated injection well with no adverse effects noted.  
 
Lilian Caba LPN

## 2018-10-23 NOTE — PROGRESS NOTES
HISTORY OF PRESENT ILLNESS Yordan Liz is a 39 y.o. female. Pt. comes in for f/u. Has multiple medical problems. Continues to have generalized arthritic pains but mostly in knees and legs. X-rays showed knee DJD in the past.  Reports chronic LBP as well. Diclofenac pill and gel helps. Reports having continued GERD. Not taking any medications for it. No other GI or  symptoms. Reports being on her feet for 8 hours of work which makes her pain worse. She has to use boots at work which are uncomfortable. Wants me to write a note so she can use her regular shoes. Also wants me to give her note to go to [facility] to get her  license. Tells me  does not want her to do it but not sure why. She has 4 children. They go to school. They are adjusting to the [place] system/life but their mother is not. Remains depressed. Has difficulty sleeping. Stopped taking Cymbalta. Tells me it was too strong. Tells about her hardship back in [place] and since then. Med list and most recent labs/studies reviewed with pt. Denies tobacco and alcohol use. Needs med refills. Reports no other new c/o. HPI Review of Systems Constitutional: Negative. HENT: Negative. Eyes: Negative. Respiratory: Negative for shortness of breath. Cardiovascular: Negative for chest pain and leg swelling. Gastrointestinal: Positive for heartburn. Negative for abdominal pain. Genitourinary: Negative for dysuria and frequency. Musculoskeletal: Positive for back pain, joint pain, myalgias and neck pain. Negative for falls. Skin: Negative. Neurological: Negative for dizziness, sensory change, focal weakness and headaches. Psychiatric/Behavioral: Positive for depression. The patient is nervous/anxious and has insomnia. All other systems reviewed and are negative. Physical Exam  
Constitutional: She is oriented to person, place, and time.  She appears well-developed and well-nourished. No distress. HENT:  
Head: Normocephalic and atraumatic. Mouth/Throat: Oropharynx is clear and moist.  
Eyes: Conjunctivae are normal.  
Neck: Normal range of motion. Neck supple. No JVD present. No thyromegaly present. Cardiovascular: Normal rate, regular rhythm, normal heart sounds and intact distal pulses. No murmur heard. Pulmonary/Chest: Effort normal and breath sounds normal. No respiratory distress. She has no wheezes. She has no rales. Abdominal: Soft. Bowel sounds are normal. She exhibits no distension. Musculoskeletal: She exhibits tenderness (Bilateral knees, lumbars,shoudlers,feet). She exhibits no edema. DJD Neurological: She is alert and oriented to person, place, and time. Coordination normal.  
Skin: Skin is warm and dry. No rash noted. Left plantar wart Psychiatric: Her behavior is normal.  
Depressed/stressed Nursing note and vitals reviewed. ASSESSMENT and PLAN Diagnoses and all orders for this visit: 
 
1. Gastroesophageal reflux disease without esophagitis 
-     REFERRAL TO GASTROENTEROLOGY 2. Chronic midline back pain, unspecified back location 
-     diclofenac EC (VOLTAREN) 75 mg EC tablet; Take 1 Tab by mouth two (2) times a day. 3. Upper back pain, chronic 
-     diclofenac EC (VOLTAREN) 75 mg EC tablet; Take 1 Tab by mouth two (2) times a day. 4. Neck pain 
-     diclofenac EC (VOLTAREN) 75 mg EC tablet; Take 1 Tab by mouth two (2) times a day. 5. Body aches 
-     diclofenac EC (VOLTAREN) 75 mg EC tablet; Take 1 Tab by mouth two (2) times a day. 6. Arthralgia of knee, right 
-     REFERRAL TO ORTHOPEDICS 
-     diclofenac EC (VOLTAREN) 75 mg EC tablet; Take 1 Tab by mouth two (2) times a day. 7. Chronic pain of both knees 
-     REFERRAL TO ORTHOPEDICS 
-     diclofenac EC (VOLTAREN) 75 mg EC tablet; Take 1 Tab by mouth two (2) times a day. 8. Encounter for immunization -     INFLUENZA VIRUS VAC QUAD,SPLIT,PRESV FREE SYRINGE IM 9. Encounter for screening mammogram for breast cancer -     [facility] MAMMO BI SCREENING INCL CAD; Future Other orders -     pantoprazole (PROTONIX) 40 mg tablet; Take 1 Tab by mouth daily. Follow-up Disposition: 
Return in about 3 months (around 1/23/2019). lab results and schedule of future lab studies reviewed with patient 
reviewed diet, exercise and weight control 
reviewed medications and side effects in detail F/u with other MD's as scheduled Note given to patient to be able to use regular shoes at work because of her chronic leg pain Note given to patient to take to [facility] and get her 's license Talk to patient about being positive over the last about things that she does not have any control over Discussed importance of taking medications as prescribed

## 2018-10-23 NOTE — LETTER
NOTIFICATION RETURN TO WORK / SCHOOL 
 
10/23/2018 12:05 PM 
 
Ms. Kerin Hammans [address] [address] To Whom It May Concern: 
 
Kerin Hammans is currently under the care of [address]. The patient should not use boots at work due to chronic foot pain. She may wear regular shoes. If there are questions or concerns please have the patient contact our office.  
 
 
 
Sincerely, 
 
 
Ramila Fleming, DO

## 2018-11-01 ENCOUNTER — TELEPHONE (OUTPATIENT)
Dept: INTERNAL MEDICINE CLINIC | Age: 43
End: 2018-11-01

## 2018-11-01 NOTE — TELEPHONE ENCOUNTER
Patient has an appointment scheduled for Monday 11/5/18  Dr. Chico Morales office needs recent visit notes/ med list/ labs and dx tests pertaining to patients appointment to be faxed to [phone] asap

## 2018-12-12 ENCOUNTER — HOSPITAL ENCOUNTER (OUTPATIENT)
Dept: MAMMOGRAPHY | Age: 43
Discharge: HOME | End: 2018-12-12
Attending: INTERNAL MEDICINE
Payer: MEDICAID

## 2018-12-12 DIAGNOSIS — Z12.31 ENCOUNTER FOR SCREENING MAMMOGRAM FOR BREAST CANCER: ICD-10-CM

## 2018-12-12 PROCEDURE — 77067 SCR MAMMO BI INCL CAD: CPT

## 2019-02-12 ENCOUNTER — OFFICE VISIT (OUTPATIENT)
Dept: INTERNAL MEDICINE CLINIC | Age: 44
End: 2019-02-12

## 2019-02-12 VITALS
WEIGHT: 135 LBS | BODY MASS INDEX: 26.5 KG/M2 | SYSTOLIC BLOOD PRESSURE: 117 MMHG | RESPIRATION RATE: 16 BRPM | HEART RATE: 89 BPM | TEMPERATURE: 96.2 F | OXYGEN SATURATION: 98 % | DIASTOLIC BLOOD PRESSURE: 82 MMHG | HEIGHT: 60 IN

## 2019-02-12 DIAGNOSIS — M25.561 CHRONIC PAIN OF BOTH KNEES: Primary | ICD-10-CM

## 2019-02-12 DIAGNOSIS — M25.562 CHRONIC PAIN OF BOTH KNEES: Primary | ICD-10-CM

## 2019-02-12 DIAGNOSIS — F33.41 RECURRENT MAJOR DEPRESSIVE DISORDER, IN PARTIAL REMISSION (HCC): ICD-10-CM

## 2019-02-12 DIAGNOSIS — K21.9 GASTROESOPHAGEAL REFLUX DISEASE WITHOUT ESOPHAGITIS: ICD-10-CM

## 2019-02-12 DIAGNOSIS — F43.10 PTSD (POST-TRAUMATIC STRESS DISORDER): ICD-10-CM

## 2019-02-12 DIAGNOSIS — G89.29 CHRONIC PAIN OF BOTH KNEES: Primary | ICD-10-CM

## 2019-02-12 RX ORDER — PANTOPRAZOLE SODIUM 40 MG/1
40 TABLET, DELAYED RELEASE ORAL DAILY
Qty: 30 TAB | Refills: 5 | Status: SHIPPED | OUTPATIENT
Start: 2019-02-12 | End: 2019-08-13 | Stop reason: SDUPTHER

## 2019-02-12 RX ORDER — DICLOFENAC SODIUM 10 MG/G
GEL TOPICAL 4 TIMES DAILY
Qty: 100 G | Refills: 5 | Status: SHIPPED | OUTPATIENT
Start: 2019-02-12 | End: 2019-08-13 | Stop reason: SDUPTHER

## 2019-02-12 NOTE — PROGRESS NOTES
Mariah Jones is a 38 y.o. female    Chief Complaint   Patient presents with    Routine     1. Have you been to the ER, urgent care clinic since your last visit? Hospitalized since your last visit? No     2. Have you seen or consulted any other health care providers outside of the [address] [place] [address] since your last visit? Include any pap smears or colon screening.   No     Visit Vitals  /82   Pulse 89   Temp 96.2 °F (35.7 °C) (Oral)   Resp 16   Ht 5' (1.524 m)   Wt 135 lb (61.2 kg)   SpO2 98%   BMI 26.37 kg/m²

## 2019-02-12 NOTE — PROGRESS NOTES
HISTORY OF PRESENT ILLNESS  Rhonda Giles is a 38 y.o. female. Pt. comes in with her  for f/u. Has multiple medical problems. Continues to have chronic bilateral knee pain. Has significant arthritis. Followed by orthopedic MD who has recommended bilateral TKRs. Patient is hesitant to do it since she will be out of work for 6 weeks or more. She is concerned about her expenses, bills, and kids. She is frustrated because there is no financial guarantee while she is out of work. Voltaren pill and gel helps some. Continues to have GERD issues. Prilosec is not helping much. Has seen GI with plans of endoscopy. Has PTSD and depression. Not interested in taking medications. Reports compliance with medications and diet. Med list and most recent labs/studies reviewed with pt. Trying to be active physically as tolerated. Needs med refills. No tobacco or alcohol use. Reports no other new c/o. HPI    Review of Systems   Constitutional: Negative. HENT: Negative. Eyes: Negative. Respiratory: Negative for shortness of breath. Cardiovascular: Negative for chest pain and leg swelling. Gastrointestinal: Positive for heartburn. Negative for abdominal pain. Genitourinary: Negative for dysuria and frequency. Musculoskeletal: Positive for back pain and joint pain. Negative for falls. Skin: Negative. Neurological: Negative for dizziness, sensory change, focal weakness and headaches. Psychiatric/Behavioral: Positive for depression. The patient is nervous/anxious and has insomnia. All other systems reviewed and are negative. Physical Exam   Constitutional: She is oriented to person, place, and time. She appears well-developed and well-nourished. No distress. HENT:   Head: Normocephalic and atraumatic. Mouth/Throat: Oropharynx is clear and moist.   Eyes: Conjunctivae are normal.   Neck: Normal range of motion. Neck supple. No JVD present. No thyromegaly present. Cardiovascular: Normal rate, regular rhythm, normal heart sounds and intact distal pulses. No murmur heard. Pulmonary/Chest: Effort normal and breath sounds normal. No respiratory distress. She has no wheezes. She has no rales. Abdominal: Soft. Bowel sounds are normal. She exhibits no distension. There is no tenderness. Musculoskeletal: She exhibits tenderness (Bilateral knees, lumbars). She exhibits no edema. DJD   Neurological: She is alert and oriented to person, place, and time. Coordination normal.   Skin: Skin is warm and dry. No rash noted. Left plantar wart   Psychiatric: Her behavior is normal.   Depressed/stressed   Nursing note and vitals reviewed. ASSESSMENT and PLAN  Diagnoses and all orders for this visit:    1. Chronic pain of both knees    2. Gastroesophageal reflux disease without esophagitis    3. Recurrent major depressive disorder, in partial remission ([facility] [address]    4. PTSD (post-traumatic stress disorder)    Other orders  -     pantoprazole (PROTONIX) 40 mg tablet; Take 1 Tab by mouth daily. -     diclofenac (VOLTAREN) 1 % gel; Apply  to affected area four (4) times daily. Follow-up Disposition:  Return in about 3 months (around 5/12/2019).    lab results and schedule of future lab studies reviewed with patient  reviewed diet, exercise and weight control  reviewed medications and side effects in detail  F/u with other MD's as scheduled

## 2019-04-09 ENCOUNTER — ANESTHESIA (OUTPATIENT)
Dept: ENDOSCOPY | Age: 44
End: 2019-04-09
Payer: MEDICAID

## 2019-04-09 ENCOUNTER — HOSPITAL ENCOUNTER (OUTPATIENT)
Age: 44
Setting detail: OUTPATIENT SURGERY
Discharge: HOME | End: 2019-04-09
Attending: SPECIALIST | Admitting: SPECIALIST
Payer: MEDICAID

## 2019-04-09 ENCOUNTER — ANESTHESIA EVENT (OUTPATIENT)
Dept: ENDOSCOPY | Age: 44
End: 2019-04-09
Payer: MEDICAID

## 2019-04-09 VITALS
DIASTOLIC BLOOD PRESSURE: 85 MMHG | HEART RATE: 83 BPM | SYSTOLIC BLOOD PRESSURE: 129 MMHG | OXYGEN SATURATION: 100 % | TEMPERATURE: 97.6 F | RESPIRATION RATE: 20 BRPM

## 2019-04-09 LAB
H PYLORI FROM TISSUE: NEGATIVE
HCG UR QL: NEGATIVE
KIT LOT NO., HCLOLOT: NORMAL
NEGATIVE CONTROL: NEGATIVE
POSITIVE CONTROL: POSITIVE

## 2019-04-09 PROCEDURE — 77030027957 HC TBNG IRR ENDOGTR BUSS -B: Performed by: SPECIALIST

## 2019-04-09 PROCEDURE — 77030009426 HC FCPS BIOP ENDOSC BSC -B: Performed by: SPECIALIST

## 2019-04-09 PROCEDURE — 74011250637 HC RX REV CODE- 250/637: Performed by: SPECIALIST

## 2019-04-09 PROCEDURE — 81025 URINE PREGNANCY TEST: CPT

## 2019-04-09 PROCEDURE — 87077 CULTURE AEROBIC IDENTIFY: CPT | Performed by: SPECIALIST

## 2019-04-09 PROCEDURE — 76060000032 HC ANESTHESIA 0.5 TO 1 HR: Performed by: SPECIALIST

## 2019-04-09 PROCEDURE — 76040000007: Performed by: SPECIALIST

## 2019-04-09 PROCEDURE — 74011250636 HC RX REV CODE- 250/636

## 2019-04-09 RX ORDER — PHENYLEPHRINE HCL IN 0.9% NACL 0.4MG/10ML
SYRINGE (ML) INTRAVENOUS AS NEEDED
Status: DISCONTINUED | OUTPATIENT
Start: 2019-04-09 | End: 2019-04-09 | Stop reason: HOSPADM

## 2019-04-09 RX ORDER — DEXTROMETHORPHAN/PSEUDOEPHED 2.5-7.5/.8
1.2 DROPS ORAL
Status: DISCONTINUED | OUTPATIENT
Start: 2019-04-09 | End: 2019-04-09 | Stop reason: HOSPADM

## 2019-04-09 RX ORDER — FENTANYL CITRATE 50 UG/ML
200 INJECTION, SOLUTION INTRAMUSCULAR; INTRAVENOUS
Status: DISCONTINUED | OUTPATIENT
Start: 2019-04-09 | End: 2019-04-09 | Stop reason: HOSPADM

## 2019-04-09 RX ORDER — SODIUM CHLORIDE 9 MG/ML
INJECTION, SOLUTION INTRAVENOUS
Status: DISCONTINUED | OUTPATIENT
Start: 2019-04-09 | End: 2019-04-09 | Stop reason: HOSPADM

## 2019-04-09 RX ORDER — SODIUM CHLORIDE 9 MG/ML
50 INJECTION, SOLUTION INTRAVENOUS CONTINUOUS
Status: DISCONTINUED | OUTPATIENT
Start: 2019-04-09 | End: 2019-04-09 | Stop reason: HOSPADM

## 2019-04-09 RX ORDER — SODIUM CHLORIDE 0.9 % (FLUSH) 0.9 %
5-40 SYRINGE (ML) INJECTION EVERY 8 HOURS
Status: DISCONTINUED | OUTPATIENT
Start: 2019-04-09 | End: 2019-04-09 | Stop reason: HOSPADM

## 2019-04-09 RX ORDER — FLUMAZENIL 0.1 MG/ML
0.2 INJECTION INTRAVENOUS
Status: DISCONTINUED | OUTPATIENT
Start: 2019-04-09 | End: 2019-04-09 | Stop reason: HOSPADM

## 2019-04-09 RX ORDER — ATROPINE SULFATE 0.1 MG/ML
0.5 INJECTION INTRAVENOUS
Status: DISCONTINUED | OUTPATIENT
Start: 2019-04-09 | End: 2019-04-09 | Stop reason: HOSPADM

## 2019-04-09 RX ORDER — SODIUM CHLORIDE 0.9 % (FLUSH) 0.9 %
5-40 SYRINGE (ML) INJECTION AS NEEDED
Status: DISCONTINUED | OUTPATIENT
Start: 2019-04-09 | End: 2019-04-09 | Stop reason: HOSPADM

## 2019-04-09 RX ORDER — MIDAZOLAM HYDROCHLORIDE 1 MG/ML
.25-1 INJECTION, SOLUTION INTRAMUSCULAR; INTRAVENOUS
Status: DISCONTINUED | OUTPATIENT
Start: 2019-04-09 | End: 2019-04-09 | Stop reason: HOSPADM

## 2019-04-09 RX ORDER — NALOXONE HYDROCHLORIDE 0.4 MG/ML
0.4 INJECTION, SOLUTION INTRAMUSCULAR; INTRAVENOUS; SUBCUTANEOUS
Status: DISCONTINUED | OUTPATIENT
Start: 2019-04-09 | End: 2019-04-09 | Stop reason: HOSPADM

## 2019-04-09 RX ORDER — LIDOCAINE HYDROCHLORIDE 20 MG/ML
INJECTION, SOLUTION INFILTRATION; PERINEURAL AS NEEDED
Status: DISCONTINUED | OUTPATIENT
Start: 2019-04-09 | End: 2019-04-09 | Stop reason: HOSPADM

## 2019-04-09 RX ORDER — PROPOFOL 10 MG/ML
INJECTION, EMULSION INTRAVENOUS AS NEEDED
Status: DISCONTINUED | OUTPATIENT
Start: 2019-04-09 | End: 2019-04-09 | Stop reason: HOSPADM

## 2019-04-09 RX ORDER — EPINEPHRINE 0.1 MG/ML
1 INJECTION INTRACARDIAC; INTRAVENOUS
Status: DISCONTINUED | OUTPATIENT
Start: 2019-04-09 | End: 2019-04-09 | Stop reason: HOSPADM

## 2019-04-09 RX ADMIN — SODIUM CHLORIDE: 9 INJECTION, SOLUTION INTRAVENOUS at 12:30

## 2019-04-09 RX ADMIN — PROPOFOL 20 MG: 10 INJECTION, EMULSION INTRAVENOUS at 13:16

## 2019-04-09 RX ADMIN — Medication 40 MCG: at 13:03

## 2019-04-09 RX ADMIN — PROPOFOL 20 MG: 10 INJECTION, EMULSION INTRAVENOUS at 12:56

## 2019-04-09 RX ADMIN — PROPOFOL 20 MG: 10 INJECTION, EMULSION INTRAVENOUS at 13:11

## 2019-04-09 RX ADMIN — SODIUM CHLORIDE: 9 INJECTION, SOLUTION INTRAVENOUS at 13:24

## 2019-04-09 RX ADMIN — PROPOFOL 20 MG: 10 INJECTION, EMULSION INTRAVENOUS at 13:00

## 2019-04-09 RX ADMIN — PROPOFOL 20 MG: 10 INJECTION, EMULSION INTRAVENOUS at 12:58

## 2019-04-09 RX ADMIN — PROPOFOL 20 MG: 10 INJECTION, EMULSION INTRAVENOUS at 13:09

## 2019-04-09 RX ADMIN — PROPOFOL 20 MG: 10 INJECTION, EMULSION INTRAVENOUS at 13:04

## 2019-04-09 RX ADMIN — PROPOFOL 20 MG: 10 INJECTION, EMULSION INTRAVENOUS at 13:19

## 2019-04-09 RX ADMIN — PROPOFOL 20 MG: 10 INJECTION, EMULSION INTRAVENOUS at 13:02

## 2019-04-09 RX ADMIN — PROPOFOL 50 MG: 10 INJECTION, EMULSION INTRAVENOUS at 12:52

## 2019-04-09 RX ADMIN — SIMETHICONE 80 MG: 20 SUSPENSION/ DROPS ORAL at 13:16

## 2019-04-09 RX ADMIN — PROPOFOL 20 MG: 10 INJECTION, EMULSION INTRAVENOUS at 13:13

## 2019-04-09 RX ADMIN — LIDOCAINE HYDROCHLORIDE 50 MG: 20 INJECTION, SOLUTION INFILTRATION; PERINEURAL at 12:54

## 2019-04-09 RX ADMIN — PROPOFOL 50 MG: 10 INJECTION, EMULSION INTRAVENOUS at 12:54

## 2019-04-09 RX ADMIN — LIDOCAINE HYDROCHLORIDE 50 MG: 20 INJECTION, SOLUTION INFILTRATION; PERINEURAL at 12:52

## 2019-04-09 RX ADMIN — Medication 40 MCG: at 13:10

## 2019-04-09 RX ADMIN — PROPOFOL 20 MG: 10 INJECTION, EMULSION INTRAVENOUS at 13:07

## 2019-04-09 NOTE — ROUTINE PROCESS
Sheela Jacobo 1975 
[id number] Situation: 
Verbal report received from: Graciela Hill RN Procedure: Procedure(s): 
COLONOSCOPY 
ESOPHAGOGASTRODUODENOSCOPY (EGD) ESOPHAGOGASTRODUODENAL (EGD) BIOPSY Background: 
 
Preoperative diagnosis: MELENA, GERD, EPIGASTRIC PAIN Postoperative diagnosis: 1. - Gastritis 2. - Normal Colonoscopy :  Dr. Garrison Cochran Assistant(s): Endoscopy Technician-1: [place] Tiwari Endoscopy RN-1: Isrrael Rachel RN; Isabella Armendariz RN Specimens: * No specimens in log * H. Pylori  yes Assessment: 
Intra-procedure medications Anesthesia gave intra-procedure sedation and medications, see anesthesia flow sheet yes Intravenous fluids: NS@ Maya Angel Vital signs stable Abdominal assessment: round and soft Recommendation: 
Discharge patient per MD order. Family or Friend Permission to share finding with family or friend no

## 2019-04-09 NOTE — H&P
Colonoscopy History and Physical 
 
 
The patient was seen and examined. Date of last colonoscopy: none, Polyps  No   
 
The airway was assessed and documented. The problem list, past medical history, and medications were reviewed. Patient Active Problem List  
Diagnosis Code  DDD (degenerative disc disease), lumbar M51.36  
 Chronic midline low back pain with right-sided sciatica M54.41, G89.29  Chronic pain of both knees M25.561, M25.562, G89.29  
 Recurrent major depressive disorder, in partial remission ([facility]) F33.41  
 Gastroesophageal reflux disease without esophagitis K21.9  Irregular periods/menstrual cycles N92.6  Arthralgia M25.50  PTSD (post-traumatic stress disorder) F43.10  Fibromyalgia syndrome M79.7  Plantar wart, left foot B07.0  Pelvic pain R10.2  Ovarian cyst N83.209  Constipation K59.00 Social History Socioeconomic History  Marital status:  Spouse name: Not on file  Number of children: Not on file  Years of education: Not on file  Highest education level: Not on file Occupational History  Not on file Social Needs  Financial resource strain: Not on file  Food insecurity:  
  Worry: Not on file Inability: Not on file  Transportation needs:  
  Medical: Not on file Non-medical: Not on file Tobacco Use  Smoking status: Never Smoker  Smokeless tobacco: Never Used Substance and Sexual Activity  Alcohol use: No  
 Drug use: No  
 Sexual activity: Never Comment: has 4 children Lifestyle  Physical activity:  
  Days per week: Not on file Minutes per session: Not on file  Stress: Not on file Relationships  Social connections:  
  Talks on phone: Not on file Gets together: Not on file Attends Alevism service: Not on file Active member of club or organization: Not on file Attends meetings of clubs or organizations: Not on file Relationship status: Not on file  Intimate partner violence:  
  Fear of current or ex partner: Not on file Emotionally abused: Not on file Physically abused: Not on file Forced sexual activity: Not on file Other Topics Concern  Not on file Social History Narrative From [facility], [facility] [place].  and daughter were tortured and executed. Brother was killed by suicide bomber. Now lives here with her 4 other kids. Single income, so financial resources are tight. Past Medical History:  
Diagnosis Date  Arthritis  Chronic pain  Constipation  Depression  Ovarian cyst   
 Pelvic pain Prior to Admission Medications Prescriptions Last Dose Informant Patient Reported? Taking?  
diclofenac (VOLTAREN) 1 % gel Unknown at Unknown time  No No  
Sig: Apply  to affected area four (4) times daily. diclofenac EC (VOLTAREN) 75 mg EC tablet 4/4/2019  No No  
Sig: Take 1 Tab by mouth two (2) times a day. pantoprazole (PROTONIX) 40 mg tablet Unknown at Unknown time  No No  
Sig: Take 1 Tab by mouth daily. Facility-Administered Medications: None The patient was seen and examined in the endoscopy suite. The airway was assessed and docuemented. The problem list and medications were reviewed. Chief complaint, history of present illness, and review of systems and Past medical History are positive for: epigastric pain, GERD and heme positive stool The heart, lungs and mental status were satisfactory for the administration of sedation and for the procedure. I discussed with the patient the objectives, risks, consequences and alternatives to the procedure. Plan: Endoscopic procedure with sedation Anthony Don MD  
4/9/2019 
12:37 PM

## 2019-04-09 NOTE — ROUTINE PROCESS
[address] April 9, 2019 RE: Dajailyn Given To Whom It May Concern, This is to certify that Caty Given may return to work on Wednesday,April 10,2019. She was under a doctors care yesterday and today- 4- 8&9-19. Please feel free to contact my office if you have any questions or concerns. Thank you for your assistance in this matter. Sincerely, Po Dangelo RN

## 2019-04-09 NOTE — PROGRESS NOTES
Discharge instructions reviewed with patient,via intrurpator phone-#[phone]. All questions answered,copy to patient. Dr. Shiela Vega aware of patient complaint of pain,soft abdomen. Told to walk and move to move gas and decrease pain. Also told with interprator.

## 2019-04-09 NOTE — PROCEDURES
[address]  [address]                 Analia Nearing   :   1975   MRN:   [id number]     Date/Time:  2019 1:23 PM    Esophagogastroduodenoscopy (EGD) Procedure Note    :  Víctor Rivera MD    Referring Provider:  Clem Bernard DO    Anethesia/Sedation:  MAC anesthesia Propofol    Preoperative diagnosis: MELENA, GERD, EPIGASTRIC PAIN    Postoperative diagnosis: 1. - Gastritis  2. - Normal Colonoscopy    Procedure Details     After infom consent was obtained for the procedure, with all risks and benefits of procedure explained the patient was taken to the endoscopy suite and placed in the left lateral decubitus position. Following sequential administration of sedation as per above, the [id number] gastroscope was inserted into the mouth and advanced under direct vision to second portion of the duodenum. A careful inspection was made as the gastroscope was withdrawn, including a retroflexed view of the proximal stomach; findings and interventions are described below. Findings:  Esophagus:normal  Stomach:Patchy erythema antrum, UGO test done  Duodenum/jejunum:normal      Therapies:  none    Specimens: UGO test           EBL: None    Complications:   None; patient tolerated the procedure well. Impression:    See Postoperative diagnosis above    Recommendations:  -Acid suppression with a proton pump inhibitor. , -Await UGO test result and treat for Helicobacter pylori if positive.     Discharge disposition:  Home in the company of  when able to ambulate    Víctor Rivera MD

## 2019-04-09 NOTE — ROUTINE PROCESS
[address] April 9, 2019 RE: Alberto Vazquez To Whom It May Concern, This is to certify that Alberto Vazquez may return to work on Wednesday,April 10,2019. She was under a doctors care yesterday and today- 4- 8&9-19. Please feel free to contact my office if you have any questions or concerns. Thank you for your assistance in this matter. Sincerely, Paola Omalley RN

## 2019-04-09 NOTE — DISCHARGE INSTRUCTIONS
Marianne Whelan Patient Education       æÑã ãÚÏå: ãÑÇÞÈÊ åÇ? ÈåÏÇÔÊ? [ Gastritis: Care Instructions ]  ãÑÇÞÈÊ åÇ? ÈåÏÇÔÊ? ÔãÇ      æÑã ãÚÏå Èå ÏÑÏ æ äÇÑÇÍÊ? Ôã ÝÊå ã? ÔæÏ. æÞÊ? ?Ò? ÈÇÚË ÂÒÇÑ áÇ? å Ôã ÔæÏ¡ Ç?ä ÍÇáÊ Ñæ? ã? ÏåÏ. ÚæÇãá ÈÓ? ÇÑ? ã? ÊæÇäÏ ÈÇÚË ÈÑæÒ Âä ÔæäÏ. Ç?ä ÚæÇãá ÚÈÇÑÊäÏ ÇÒ åÑæäå ÚÝæäÊ ãËá [id number] ? Ç ? Ò? å ÎæÑÏå Ç? Ï. ÏÇÑæåÇ? ãÎÊáÝ ? Ç ÏÑÏ ÏÑ áÇ? å Ôã? (ÒÎã) ä?Ò ããä ÇÓÊ ÈÇÚË Ç? ÌÇÏ Ç?ä ÍÇáÊ ÔæÏ. ããä ÇÓÊ ÔãÊÇä æÑã äÏ æ ÏÑÏ È?ÑÏ. ããä ÇÓÊ ÂÑæÞ ÈÒä? Ï¡ ÇÓÊÝÑÇÛ ä?Ï æ ÏÑ ÔãÊÇä ÇÍÓÇÓ ÏÑÏ ä?Ï. ÈÇ ãÕÑÝ ÏÇÑæ ã? ÊæÇä? Ï Ç? ä ãÔáÇÊ ÑÇ ÈÑØÑÝ ä?Ï. æ ããä ÇÓÊ ÈÇ ÊÛ? ?Ñ Ñ?ã ÛÐÇ? ?¡ ãÔáÊÇä ÈÑØÑÝ ÔæÏ. ÇÑ æÑã ãÚÏå ÇÏÇãå ? ÏÇ ÑÏ¡ ããä ÇÓÊ ÒÔÊÇä ÏÇÑæ? ? ÑÇ ÊÌæ? Ò äÏ. ãÑÇÞÈÊ ? ? ÑÇäå ? Sara Peers ÇÕá? ÇÒ ÏÑãÇä æ Ç?ãä? ÔãÇ ÇÓÊ. ÍÊãÇð ÇÒ ÒÔ ÎæÏ æÞÊ åÇ? ãáÇÞÇÊ È? Ñ? Ï æ ÏÑ åãå ÂäåÇ ÍÖæÑ ÏÇÔÊå ÈÇÔ? Ï æ ÇÑ ãÔá? ÏÇÑ? Ï ÈÇ ÒÔ ÎæÏ ÊãÇÓ È? Ñ? Ï. ÝÑ ÈÓ? ÇÑ ÎæÈ? ÇÓÊ å äÊÇ? Ì ÂÒãÇ? Alex Pearson ÈÏÇä? Ï æ á? Marisela Honor ÏÇÑæåÇ? ? Veronica Horne ãÕÑÝ ã? ä?Ï¡ ÏÇÔÊå ÈÇÔ? Marylee Stark ã? ÊæÇä? Ï ÏÑ ÎÇäå ÇÒ ÎæÏ ãÑÇÞÈÊ ä? Ï¿   · ÇÑ ÒÔ ÈÑÇ? ÔãÇ ÂäÊ? È?æÊ? ÊÌæ? Ò ÑÏ¡ Âä ÑÇ ØÈÞ ÏÓÊæÑ ÒÔ ãÕÑÝ ä?Ï. ÝÞØ Èå Ç?ä Ïá? á å ÍÇáÊÇä ÈåÊÑ ÔÏå ÇÓÊ ãÕÑÝ ÂäÊ? È?æÊ? ÑÇ ÞØÚ ää? Ï. ÈÇ?Ï ÊãÇã ÏæÑå ÂäÊ? È?æÊ? ÑÇ ãÕÑÝ ä?Ï.  · ÏÇÑæåÇ ÑÇ ÏÑÓÊ ÇÓÊÝÇÏå ä?Ï. ÇÑ ÒÔÊÇä ÏÇÑæ? ? ÈÑÇ? ÇåÔ ÇÓ? Ï ãÚÏå ÊÌæ? Ò ÑÏ¡ Âä ÑÇ ØÈÞ ÏÓÊæÑ ÒÔ ãÕÑÝ ä?Ï. ÇÑ ãÔá? ÈÇ ÏÇÑæåÇ? ÎæÏ ÏÇÑ? Ï ÈÇ ÒÔ ÎæÏ ÊãÇÓ È? Ñ? Ï.  · ÈÏæä ãÔæÑÊ ÈÇ ÒÔ ÇÒ ÓÇ? Ñ ÏÇÑæåÇ ÇÒ Ìãáå ãÓä åÇ? ? Teresa Johansening äÓÎå ÝÑæÎÊå ã? ÔæäÏ¡ ÇÓÊÝÇÏå ää? Ï.  · ÇÑ ÒÔÊÇä ÊæÕ? å ÑÏå ÇÓÊ ÈÑÇ? ÇåÔ ÇÓ? Chey Mandes ÏÇÑæåÇ? ? Teresa Pollok äÓÎå ÝÑæÎÊå ã? ÔæäÏ [id number]G? Ï¡ ãËá Pepcid AC¡ Prilosec ¡Tagamet HB ? Ç Zantac 75¡ [id number] åÇ? Ñæ? ÈÑÓÈ ÏÇÑæ ÑÇ ÏäÈÇá ä? Ï.  · ãÇ? ÚÇÊ Ò? ÇÏ? ÈäæÔ? Ï (Èå ÞÏÑ? å ÇÏÑÇÑÊÇä ÒÑÏ ãÑä ? Ç ÈÏæä Ñä ãËá ÂÈ ÔæÏ) ÊÇ ÑØæÈÊ ÈÏäÊÇä ÍÝÙ ÔæÏ. ÂÈ æ ÓÇ? Ñ ãÇ? ÚÇÊ ÝÇÞÏ ÇÝÆ?ä ÑÇ ÇäÊÎÇÈ ä?Ï. ÇÑ È? ãÇÑ? á?å¡ ÞáÈ ? Ç ÈÏ ÏÇÑ? Ï æ ãÌÈæÑ åÓÊ? Rod Thorne ãÇ? ÚÇÊ ãÊÑ? ãÕÑÝ ä? Vallorie Slough ÇÒ äæÔ? Ïä ãÇ? ÚÇÊ È? ÔÊÑ¡ ÈÇ ÒÔ ÎæÏ ÕÍÈÊ ä?Ï.    · ãÕÑÝ Çáá ÎæÏ ÑÇ ÇåÔ Ïå? Ï.  · ÇÒ ãÕÑÝ Þåæå¡ Ç? ¡ äæÔÇÈå¡ ÔáÇÊ æ ÓÇ? Ñ ãæÇÏ ÛÐÇ? ? ÍÇæ? ÇÝÆ?ä ÎæÏÏÇÑ? ä?Ï. Ç?ä ãæÇÏ ÇÓ? [employer]ubs ÇÝÒÇ? Ô ã? ÏåäÏ. å ãæÞÚ ÈÇ? Ï ÈÑÇ? ã ÊãÇÓ È? Ñ? Ï¿  åÑ ÒãÇä å ÝÑ ã? ä?Ï åÑ ÒãÇä å ÝÑ ã? ä?Ï Èå ãÑÇÞÈÊ ÇæÑÇäÓ? ÇÍÊ? ÇÌ ÏÇÑ? Ï¡ ÈÇ 911 ÊãÇÓ È? Ñ? Alisa Lovelace? ãËÇá ÏÑ ÕæÑÊ ÈÑæÒ ÔÑÇ? Ø Ò? Ñ ÊãÇÓ È? Ñ? ÏGuerry Hutching · Îæä ? Ç ? Ò? ÔÈ?å Èå ÊÝÇáå Þåæå ÈÇáÇ ã? ÂæÑ? Ï.  · ãÏÝæÚ ÔãÇ ÎÑãÇ? ? Ñä ? Ç ÈÓ? ÇÑ Îæä? ÇÓÊ. ÏÑ ÔÑÇ? Ø Ò? Ñ ÝæÑÇð ÈÇ ÒÔÊÇä ÊãÇÓ È? Ñ?Ï ? Ç äÓÈÊ Èå ÏÑãÇä ÓÑ? Ú ÇÞÏÇã ä?Ï:   · ÇÑ Èå ÓÑÚÊ äÝÓ ã? Ô?Ï æ ÏÑ 8 ÓÇÚÊ ÐÔÊå ÏÝÚ ÇÏÑÇÑ äÏÇÔÊå Ç? Ï.  · ÇÑ äã? ÊæÇä? Ï ãÇ? ÚÇÊ ÈÎæÑ? Ï. Anita Hue ãÑÇÞÈ ÊÛ? ?Allyson Horn æÖÚ? Ê ÓáÇãÊ? ÎæÏ ÈÇÔ? Ï æ ÏÑ ãæÇÑÏ Ò? Ñ ÍÊãÇð ÈÇ ÒÔ ÎæÏ ÊãÇÓ È? Ñ? ÏGuerry Hutching · ÂäØæÑ å ÇäÊÙÇÑ ã? ÑÝÊ ÍÇáÊÇä ÈåÊÑ äÔÏå ÇÓÊ. ÌÇ ã? ÊæÇä? Ï ÇØáÇÚÇÊ È? ÔÊÑ? ÓÈ ä? Ï¿  ÈÑæ Èå [email or web address] æÇÑÏ ÑÏä [address] Shawn Primrose ÌÓÊÌæ ÈÑÇ? ÇØáÇÚÇÊ È? ÔÊÑ ÏÑÈÇÑå \"æÑã ãÚÏå: ãÑÇÞÈÊ åÇ? ÈåÏÇÔÊ? - [ Gastritis: Care Instructions ]. \"  © 9898-1033 [employer]. ãÑÇÞÈÊ åÇ? ÈåÏÇÔÊ? ÈÑÇÓÇÓ ãÌæÒ ãÊÎÕÕ ãÑÇÞÈ ÓáÇãÊ ÔãÇ ãØÇÈÞÊ ÏÇÏå ÔÏå ÇÓÊ. ÇÑ ÓÄÇá? ÏÑÈÇÑå ?  æÖÚ? Ê ÒÔ? ?Ç Ç?ä ÑÇåäãÇ ÏÇÔÊ? Ï¡ åã? Ôå ÇÒ ãÊÎÕÕ ãÑÇÞÈ ÓáÇãÊ ÎæÏ ÈÑÓ? Ï. ÔÑÊ [employer] Incorporated¡ ÇÒ ÇÑÇÆå åÑæäå ÖãÇäÊ äÇãå ? Ç ÊÚåÏ? ÏÑ ÞÈÇá [id number] ÇÒ Ç?ä IMNYOPH ÊæÓØ ÔãÇ ÓáÈ ãÓÆæá? Ê ã? äÏ. äÓÎå ãÍÊæÇ: 11.9 ÌÇÑ? ÇÒ ÊÇÑ? Î: [facility]       [id number]  1975    EGD DISCHARGE INSTRUCTIONS  Discomfort:  Sore throat-  warm salt water gargle  redness at IV site- apply warm compress to area; if redness or soreness persist- contact your physician  Gaseous discomfort- walking, belching will help relieve any discomfort  You may not operate a vehicle for 12 hours  You may not engage in an occupation involving machinery or appliances for rest of today.   You may not drink alcoholic beverages for at least 12 hours  Avoid making any critical decisions for at least 24 hour  DIET  You may resume your regular diet - however -  remember your colon is empty and a heavy meal will produce gas. Avoid these foods:  vegetables, fried / greasy foods, carbonated drinks  MEDICATIONS   Regarding Aspirin or Nonsteroidal medications specifically, please see below. ACTIVITY  You may resume your normal daily activities. Spend the remainder of the day resting -  avoid any strenuous activity. CALL M.D. ANY SIGN OF   Increasing pain, nausea, vomiting  Abdominal distension (swelling)  New increased bleeding (oral or rectal)  Fever (chills)  Pain in chest area    Shortness of breath    You may not  take any Advil, Aspirin, Ibuprofen, Motrin, Aleve, or Goodys for 10 days, ONLY  Tylenol as needed for pain.     Post procedure diagnosis: 1. - Gastritis  2. - Normal Colonoscopy      Follow-up Instructions:   Call Dr. Kelsey Mo  Results of procedure / biopsy in 10 days  Telephone #  [phone]        DISCHARGE SUMMARY from Nurse    The following personal items collected during your admission are returned to you:   Dental Appliance: Dental Appliances: None  Vision: Visual Aid: None  Hearing Aid:    Jewelry:    Clothing:    Other Valuables:    Valuables sent to safe:

## 2019-04-09 NOTE — ANESTHESIA POSTPROCEDURE EVALUATION
Post-Anesthesia Evaluation and Assessment Patient: Mikael Young MRN: [id number]  SSN: [id number] YOB: 1975  Age: 38 y.o. Sex: female I have evaluated the patient and they are stable and ready for discharge from the PACU. Cardiovascular Function/Vital Signs Visit Vitals BP 97/59 Pulse 79 Temp 36.4 °C (97.6 °F) Resp 22 SpO2 96% Breastfeeding? No  
 
 
Patient is status post MAC anesthesia for Procedure(s): 
COLONOSCOPY 
ESOPHAGOGASTRODUODENOSCOPY (EGD) ESOPHAGOGASTRODUODENAL (EGD) BIOPSY. Nausea/Vomiting: None Postoperative hydration reviewed and adequate. Pain: 
Pain Scale 1: Numeric (0 - 10) (04/09/19 1236) Pain Intensity 1: 0 (04/09/19 1236) Managed Neurological Status: At baseline Mental Status, Level of Consciousness: Alert and  oriented to person, place, and time Pulmonary Status:  
O2 Device: Nasal cannula (04/09/19 1323) Adequate oxygenation and airway patent Complications related to anesthesia: None Post-anesthesia assessment completed. No concerns Signed By: Rosendo Godoy MD   
 April 9, 2019 Procedure(s): 
COLONOSCOPY 
ESOPHAGOGASTRODUODENOSCOPY (EGD) ESOPHAGOGASTRODUODENAL (EGD) BIOPSY. MAC 
 
<BSHSIANPOST> Vitals Value Taken Time BP Temp Pulse 89 4/9/2019  1:33 PM  
Resp 22 4/9/2019  1:33 PM  
SpO2 96 % 4/9/2019  1:33 PM  
Vitals shown include unvalidated device data.

## 2019-04-09 NOTE — PROCEDURES
[address], [address] [place] [address]                 Colonoscopy Procedure Note    Indications:   See Preoperative Diagnosis above  Referring Physician: Karina Dumont DO  Anesthesia/Sedation: MAC anesthesia Propofol  Endoscopist:  Dr. Ragini Murguia  Assistant:  Endoscopy Technician-1: Nils Marr  Endoscopy RN-1: Caro Pennington, RN; Cristina Mojica, RN  Preoperative diagnosis: MELENA, GERD, EPIGASTRIC PAIN  Postoperative diagnosis: 1. - Gastritis  2. - Normal Colonoscopy    Procedure in Detail:  Informed consent was obtained for the procedure, including sedation. Risks of perforation, hemorrhage, adverse drug reaction, and aspiration were discussed. The patient was placed in the left lateral decubitus position. Based on the pre-procedure assessment, including review of the patient's medical history, medications, allergies, and review of systems, she had been deemed to be an appropriate candidate for moderate sedation; she was therefore sedated with the medications listed above. The patient was monitored continuously with ECG tracing, pulse oximetry, blood pressure monitoring, and direct observations. A rectal examination was performed. The [id number] was inserted into the rectum and advanced under direct vision to the cecum, which was identified by the ileocecal valve and appendiceal orifice. The quality of the colonic preparation was adequate. A careful inspection was made as the colonoscope was withdrawn, including a retroflexed view of the rectum; findings and interventions are described below. Appropriate photodocumentation was obtained. Findings:  Rectum: normal  Sigmoid: normal  Descending Colon: normal  Transverse Colon: normal  Ascending Colon: normal  Cecum: normal      Specimens:    none    EBL: None    Complications: None; patient tolerated the procedure well. Recommendations:     - Repeat colonoscopy in 5 years.         Signed By: Ragini Murguia MD                        April 9, 2019

## 2019-04-09 NOTE — PROGRESS NOTES

## 2019-04-09 NOTE — ANESTHESIA PREPROCEDURE EVALUATION
Relevant Problems No relevant active problems Anesthetic History No history of anesthetic complications Review of Systems / Medical History Patient summary reviewed, nursing notes reviewed and pertinent labs reviewed Pulmonary Within defined limits Neuro/Psych Psychiatric history Cardiovascular Exercise tolerance: >4 METS 
  
GI/Hepatic/Renal 
  
 
 
 
 
 
 Endo/Other Arthritis Other Findings Physical Exam 
 
Airway Mallampati: I 
TM Distance: > 6 cm Neck ROM: normal range of motion Mouth opening: Normal 
 
 Cardiovascular Rhythm: regular Rate: normal 
 
 
 
 Dental 
No notable dental hx Pulmonary Breath sounds clear to auscultation Abdominal 
 
 
 
 Other Findings Anesthetic Plan ASA: 1 Anesthesia type: MAC Induction: Intravenous Anesthetic plan and risks discussed with: Patient

## 2019-05-07 ENCOUNTER — OFFICE VISIT (OUTPATIENT)
Dept: OBGYN CLINIC | Age: 44
End: 2019-05-07

## 2019-05-07 VITALS
BODY MASS INDEX: 26.9 KG/M2 | WEIGHT: 137 LBS | HEIGHT: 60 IN | DIASTOLIC BLOOD PRESSURE: 70 MMHG | SYSTOLIC BLOOD PRESSURE: 106 MMHG

## 2019-05-07 DIAGNOSIS — N89.8 VAGINAL DISCHARGE: ICD-10-CM

## 2019-05-07 DIAGNOSIS — R30.0 DYSURIA: Primary | ICD-10-CM

## 2019-05-07 LAB
BACTERIA, POC WET MOUNT: NORMAL
BILIRUB UR QL STRIP: NEGATIVE
CLUE, POC WET MOUNT: NORMAL
GLUCOSE UR-MCNC: NEGATIVE MG/DL
KETONES P FAST UR STRIP-MCNC: NEGATIVE MG/DL
PH UR STRIP: 5 [PH] (ref 4.6–8)
PROT UR QL STRIP: NEGATIVE
RBC, POC WET MOUNT: NORMAL
SP GR UR STRIP: 1.01 (ref 1–1.03)
TRICH, POC WET MOUNT: NORMAL
UA UROBILINOGEN AMB POC: NORMAL (ref 0.2–1)
URINALYSIS CLARITY POC: CLEAR
URINALYSIS COLOR POC: YELLOW
URINE BLOOD POC: NEGATIVE
URINE LEUKOCYTES POC: NEGATIVE
URINE NITRITES POC: NEGATIVE
WBC, POC WET MOUNT: NORMAL
YEAST, POC WET MOUNT: NORMAL

## 2019-05-07 RX ORDER — FLUCONAZOLE 150 MG/1
150 TABLET ORAL ONCE
Qty: 2 TAB | Refills: 0 | Status: SHIPPED | OUTPATIENT
Start: 2019-05-07 | End: 2019-05-07

## 2019-05-07 NOTE — PATIENT INSTRUCTIONS
ÚÝæäÊ ãÌÑÇ? ÇÏÑÇÑ ÏÑ ÒäÇä: EYIGOUVZPE åÇ? ãÑÇÞÈÊ? [ Urinary Tract Infection in Women: Care Instructions ]  ãÑÇÞÈÊ åÇ? ÈåÏÇÔÊ? ÚÝæäÊ ãÌÑÇ? ÇÏÑÇÑ ? Ç UTI¡ ?  ÇÕØáÇÍ Úãæã? ÈÑÇ? ÚÝæäÊ ÏÑ åÑ ÞÓãÊ È?ä á? å åÇ æ ãÌÑÇ? ÇÏÑÇÑ (ÌÇ?? å ÇÏÑÇÑ È? Ñæä ã? Â? Ï) ã? ÈÇÔÏ. È?ÔÊÑ UTIåÇ ÚÝæäÊ åÇ? ãËÇäå ã? ÈÇÔäÏ. ÛÇáÈÇð åäÇã ÏÝÚ ÇÏÑÇÑ ÈÇÚË ÏÑÏ æ ÓæÒÔ ã? ÔæäÏ. Shirlie Rom æÓ?áå ÈÇÊÑ? Ç? ÌÇÏ ã? ÔæäÏ æ ÈÇ ãÕÑÝ ÂäÊ? È?æÊ? ÞÇÈá ÏÑãÇä ã? ÈÇÔäÏ. ÍÊãÇð ÏæÑå ÏÑãÇä ÑÇ Çãá ä? Dayla Lofty ØæÑ? å ÚÝæäÊ ÇÒ È?ä ÈÑæÏ. ãÑÇÞÈÊ ? ? ÑÇäå ? Brielle Miller ÇÕá? ÇÒ ÏÑãÇä æ Ç?ãä? ÔãÇ ÇÓÊ. ÍÊãÇð ÇÒ ÒÔ ÎæÏ æÞÊ ãáÇÞÇÊ åÇ?? È?Ñ? Ï æ ÏÑ åãå ÂäåÇ ÍÖæÑ ÏÇÔÊå ÈÇÔ? Ï æ ÇÑ ãÔá? ÏÇÑ? Ï ÈÇ ÒÔ ÎæÏ ÊãÇÓ È? Ñ? Ï. ÝÑ ÈÓ? ÇÑ ÎæÈ? ÇÓÊ å äÊÇ? Ì ÂÒãÇ? Daina Michele ÈÏÇä? Ï æ á? Hair Reaves ÏÇÑæåÇ? ? Tello Iqbal ãÕÑÝ ã? ä?Ï¡ ÏÇÔÊå ÈÇÔ? Larwence Leeks ã? ÊæÇä? Ï ÏÑ ÎÇäå ÇÒ ÎæÏ ãÑÇÞÈÊ ä? Ï¿   · ÂäÊ? È?æÊ? åÇ? ÎæÏ ÑÇ ØÈÞ ÏÓÊæÑ ÒÔ ãÕÑÝ ä?Ï. ÝÞØ Èå Ç?ä Ïá? á å ÍÇáÊÇä ÈåÊÑ ÔÏå ÇÓÊ ãÕÑÝ ÂäÊ? È?æÊ? ÑÇ ÞØÚ ää? Ï. ÈÇ?Ï ÊãÇã ÏæÑå ÂäÊ? È?æÊ? ÑÇ ãÕÑÝ ä?Ï.  · ? ? Ç Ïæ ÑæÒ ÈÚÏ ÂÈ æ ãÇ? ÚÇÊ ÇÖÇÝ? ÈäæÔ? Ï. ÈÇ Ç?ä ÇÑ ÈÇÊÑ? åÇ? ÚÇãá ÚÝæäÊ ÇÒ ÈÏä ÎÇÑÌ ã? ÔæäÏ. (ÇÑ È? ãÇÑ? á?å¡ ÞáÈ ? Ç ÈÏ ÏÇÑ? Ï æ ãÌÈæÑ åÓÊ? Aldean Kenneth ãÇ? ÚÇÊ ãÊÑ? ãÕÑÝ ä? Janine Curet ÇÒ äæÔ? Ïä ãÇ? ÚÇÊ È? ÔÊÑ¡ ÈÇ ÒÔ ÎæÏ ÕÍÈÊ ä?Ï.)   · ÇÒ äæÔ? Ïä ãÇ? ÚÇÊ ÍÇæ? Ï? ÇÓ? Ï ÑÈä ? Ç ÇÝÆ?ä ÇÌÊäÇÈ ä?Ï. Ç?ä ãÇ? ÚÇÊ ÈÇÚË ÓæÒÔ ãËÇäå ã? ÔæäÏ.  · Ò? ÇÏ ÇÏÑÇÑ ä?Ï. åÑ ÈÇÑ ÓÚ? ä?Ï ãËÇäå ÎæÏ ÑÇ ÎÇá? ä?Ï.  · ÈÑÇ? ÊÓ?ä ÏÑÏ¡ ÏæÔ ÂÈ Ñã È? Ñ?Ï ?Ç ? ? Óå ÂÈ Ñã å Ñæ? ÏÑÌå ã ÊäÙ? ã ÔÏå ÇÓÊ ÑÇ Ñæ? ÞÓãÊ Ç??ä Ôã ? Ç äÇÍ? å ÊäÇÓá? ÞÑÇÑ Ïå? Ï. åÑÒ æÞÊ? ? Kaya Nickolas Ñã ÐÇÔÊå Ç? Ï äÎæÇÈ? Ï. ?Ô? Ñ? ÇÒ UTIåÇ   · åÑ ÑæÒ ãÞÏÇÑ Ò? ÇÏ? ÂÈ ÈäæÔ? Ï. Ç?ä ã ã? äÏ È? ÔÊÑ ÇÏÑÇÑ ä?Ï å ÏÑ äÊ? Ìå ÈÇÊÑ? åÇ ÇÒ ÈÏä ÔãÇ ÎÇÑÌ ã? ÔæÏ. (ÇÑ È? ãÇÑ? á?å¡ ÞáÈ ? Ç ÈÏ ÏÇÑ? Ï æ ãÌÈæÑ åÓÊ? Aldean Kenneth ãÇ? ÚÇÊ ãÊÑ? ãÕÑÝ ä? Janine Curet ÇÒ äæÔ? Ïä ãÇ? ÚÇÊ È? ÔÊÑ¡ ÈÇ ÒÔ ÎæÏ ÕÍÈÊ ä?Ï.)   · ÂÈ ÑÇä ÈÑ? Eunice Maha Ñ? ã ÛÐÇ?? ÎæÏ ÇÖÇÝå ä?Ï.  · åÑ æÞÊ áÇÒã ÇÓÊ ÇÏÑÇÑ ä?Ï.  · TUOTVUXS Ó ÇÒ ÑÇÈØå ÌäÓ? ÇÏÑÇÑ ä?Ï.  · äæÇÑ ÈåÏÇÔÊ?  Yvonne Seed ÒæÏ Èå ÒæÏ ÚæÖ ä? Ï.  · ÇÒ THXBIVN ÇÒ á ÏæÔ¡ æÇä Ñ Ý¡ ÇÓÑ? åÇ? ÈåÏÇÔÊ? ÒäÇäå æ ÓÇ? Ñ ãÍÕæáÇÊ ÈåÏÇÔÊ? ÒäÇäå ÍÇæ? HAWIZCNHU ÇÌÊäÇÈ ä?Ï.  · Ó ÇÒ ÊæÇáÊ¡ ÎæÏ ÑÇ ÇÒ Ìáæ Èå ÚÞÈ ÎÔ ä?Ï. å ãæÞÚ ÈÇ? Ï ÈÑÇ? ã ÊãÇÓ È? Ñ? Ï¿  Çäæä ÈÇ ÒÔ ÎæÏ ÊãÇÓ È? Ñ?Ï ? Ç ÏÑÎæÇÓÊ ãÑÇÞÈÊ ÒÔ? ÝæÑ? ä?Ï¡ ÇÑ:   · ÚáÇÆã È? ãÇÑ? ãÇääÏ ÊÈ¡ áÑÒ¡ ÍÇáÊ ÊåæÚ ? Ç ÇÓÊÝÑÇÛ ÈÏÊÑ ÔÏå ? Ç ÈÑÇ? Çæá?ä ÈÇÑ ÑÎ ÏÇÏå ÇÓÊ.  · ÏÑ ÔÊ ÎæÏ¡ ÏÑÓÊ Ò? Ñ ÞÝÓå Ó?äå ÇÍÓÇÓ ÏÑÏ ÌÏ?Ï? ã? ä?Ï. Ç?ä UCZOTR ÝÊå ã? ÔæÏ.  · Ñ ? Ç Îæä ÊÇÒå ÏÑ ÇÏÑÇÑ ÎæÏ ãÔÇåÏå ã? ä?Ï.  · ãÔá? ÈÇ ÏÇÑæ? ÂäÊ? È?æÊ? Loel Sails ÏÇÑ? Ï. Earna Furl ãÑÇÞÈ ÊÛ? ?Thomasene Peasant æÖÚ? Ê ÓáÇãÊ? ÎæÏ ÈÇÔ? Ï æ ÏÑ ãæÇÑÏ Ò? Ñ ÍÊãÇð ÈÇ ÒÔ ÎæÏ ÊãÇÓ È? Ñ? Ï:   · Ó ÇÒ 1 ÑæÒ (24 ÓÇÚÊ) ÍÇáÊÇä ÈåÊÑ äÔÏå ÇÓÊ.  · ÚáÇÆã È? ãÇÑ? ÈÑØÑÝ ã? ÔæäÏ æ ÏæÈÇÑå ÈÇÒ ã? ÑÏäÏ. ÌÇ ã? ÊæÇä? Ï ÇØáÇÚÇÊ È? ÔÊÑ? ÓÈ ä? Ï¿  ÈÑæ Èå [email or web address] æÇÑÏ ÑÏä [id number] Hettie Meres ÌÓÊÌæ ÈÑÇ? ÇØáÇÚÇÊ È? ÔÊÑ ÏÑÈÇÑå \"ÚÝæäÊ ãÌÑÇ? ÇÏÑÇÑ ÏÑ ÒäÇä: CNOLVOYIUA åÇ? ãÑÇÞÈÊ? - [ Urinary Tract Infection in Women: Care Instructions ]. \"  © [id number] [employer]. ãÑÇÞÈÊ åÇ? ÈåÏÇÔÊ? ÈÑÇÓÇÓ ãÌæÒ ãÊÎÕÕ ãÑÇÞÈ ÓáÇãÊ ÔãÇ ãØÇÈÞÊ ÏÇÏå ÔÏå ÇÓÊ. ÇÑ ÓÄÇá? ÏÑÈÇÑå ?  æÖÚ? Ê ÒÔ? ?Ç Ç?ä ÑÇåäãÇ ÏÇÔÊ? Ï¡ åã? Ôå ÇÒ ãÊÎÕÕ ãÑÇÞÈ ÓáÇãÊ ÎæÏ ÈÑÓ? Ï. ÔÑÊ [employer], Incorporated¡ ÇÒ ÇÑÇÆå åÑæäå ÖãÇäÊ äÇãå ? Ç ÊÚåÏ? ÏÑ ÞÈÇá TUQWBZG ÇÒ Ç?ä RFUEOHZ ÊæÓØ ÔãÇ ÓáÈ ãÓÆæá? Ê ã? äÏ. äÓÎå ãÍÊæÇ: 11.9 ÌÇÑ? ÇÒ ÊÇÑ? Î: 20 ãÇÑÓ 2018

## 2019-05-07 NOTE — PROGRESS NOTES
Problem Visit    [place]  used for visit. #[id number]    Aggie Douglass is a 38 y.o.  A2 perimenopausal female presenting for burning with urination, back pain, and increased vaginal discharge for 1 week. +associated symptoms of vulvovaginal pruritis/odor. Not sexually active. No recent antibiotic use. Chronic lumbar pain.  and daughter were tortured and executed. Brother was killed by a suicide bomber. Son has epilepsy. Labs from prior visit:  Hgb 11.9  Thyroid fxn wnl - TSH 3.36  Prolactin wnl - 15.3  FSH/LH/estradiol - c/w perimenopause vs. Menopausal status (likely transitioning)  No evidence of UTI  Pap NILM HPV neg, repeat 5 years  Nuswab plus neg    TVUS 10/9/18:  THE UTERUS IS ANTEVERTED, NORMAL IN SIZE AND ECHOGENICITY. THE ENDOMETRIUM MEASURES 7.5MM IN THICKNESS. THERE APPEARS TO BE A SMALL TRACE OF FLUID SEEN  WITHIN THE ENDOMETRIAL LINING. RIGHT OVARY APPEARS WNL. LEFT OVARY NOT SEEN DUE TO BOWEL GAS. NO FREE FLUID IS SEEN IN THE CDS. Ultrasound performed 18:  THE UTERUS IS ANTEVERTED, NORMAL IN SIZE AND ECHOGENICITY. THE ENDOMETRIUM MEASURES 6MM IN THICKNESS. NO MASSES OR ABNORMALITIES ARE SEEN. RIGHT OVARY APPEARS ENLARGED WITH A SIMPLE CYST MEASURING 30 X 32 X 32 MM. LEFT OVARY APPEARS WNL. NO FREE FLUID IS SEEN IN THE CDS. Ob/Gyn Hx:   A2 - 5 vaginal deliveries, 2 SAB  LMP- 19  Menses- irregular x 1 year  Contraception- none  STI- denies  ? SA- no    Health maintenance:  Pap- 6/15/18 NILM HPV Neg  Mammo- never    Past Medical History:   Diagnosis Date    Arthritis     Chronic pain     Constipation     Depression     Ovarian cyst     Pelvic pain        Past Surgical History:   Procedure Laterality Date    COLONOSCOPY N/A 2019    COLONOSCOPY performed by Alexander Andres MD at [facility] ENDOSCOPY       Family History   Problem Relation Age of Onset    No Known Problems Mother     No Known Problems Father      Social History     Socioeconomic History    Marital status:      Spouse name: Not on file    Number of children: Not on file    Years of education: Not on file    Highest education level: Not on file   Tobacco Use    Smoking status: Never Smoker    Smokeless tobacco: Never Used   Substance and Sexual Activity    Alcohol use: No    Drug use: No    Sexual activity: Not Currently     Comment: has 4 children   Social History Narrative    From [facility], speaks [place], no [place].  and daughter were tortured and executed. Brother was killed by suicide bomber. Now lives here with her 4 other kids. Single income, so financial resources are tight. Current Outpatient Medications   Medication Sig Dispense Refill    pantoprazole (PROTONIX) 40 mg tablet Take 1 Tab by mouth daily. 30 Tab 5    diclofenac (VOLTAREN) 1 % gel Apply  to affected area four (4) times daily. 100 g 5    diclofenac EC (VOLTAREN) 75 mg EC tablet Take 1 Tab by mouth two (2) times a day.  60 Tab 1       No Known Allergies    Review of Systems - History obtained from the patient  Constitutional: negative for weight loss, fever, night sweats  HEENT: negative for hearing loss, earache, congestion, snoring, sorethroat  CV: negative for chest pain, palpitations, edema  Resp: negative for cough, shortness of breath, wheezing  GI: negative for change in bowel habits, abdominal pain, black or bloody stools  : negative for frequency, dysuria, hematuria, vaginal discharge, +irregular menses, +vaginal discharge, dysuria, vulvovaginal pruritis  MSK: negative for back pain, joint pain, muscle pain  Breast: negative for breast lumps, nipple discharge, galactorrhea  Skin :negative for itching, rash, hives  Neuro: negative for dizziness, headache, confusion, weakness  Psych: negative for anxiety, depression, change in mood  Heme/lymph: negative for bleeding, bruising, pallor    Physical Exam  Visit Vitals  Ht 5' (1.524 m)   Wt 137 lb (62.1 kg) BMI 26.76 kg/m²     PHYSICAL EXAMINATION    Constitutional  · Appearance: well-nourished, well developed, alert, in no acute distress    HENT  · Head and Face: appears normal    Neck  · Inspection/Palpation: normal appearance, no masses or tenderness  · Lymph Nodes: no lymphadenopathy present  · Thyroid: gland size normal, nontender, no nodules or masses present on palpation    Chest  · Respiratory Effort: breathing labored  · Auscultation: normal breath sounds    Cardiovascular  · Heart:  · Auscultation: regular rate and rhythm without murmur    Gastrointestinal  · Abdominal Examination: abdomen slightly tender in suprapubic region to palpation, normal bowel sounds, no masses present, no CVAT  · Liver and spleen: no hepatomegaly present, spleen not palpable  · Hernias: no hernias identified    Genitourinary  · External Genitalia: normal appearance for age, no discharge present, no tenderness present, no inflammatory lesions present, no masses present, no atrophy present  · Vagina: normal vaginal vault without central or paravaginal defects, no inflammatory lesions present, no masses present, +diffuse erythema, moderate amount of clear discharge, +brightly positive nitrazine  · Bladder: +moderately tender to palpation  · Urethra: appears normal  · Cervix: normal   · Uterus: normal size, shape and consistency  · Adnexa: no adnexal tenderness present, no adnexal masses present  · Perineum: perineum within normal limits, no evidence of trauma, no rashes or skin lesions present  · Anus: anus within normal limits, no hemorrhoids present  · Inguinal Lymph Nodes: no lymphadenopathy present    Skin  · General Inspection: no rash, no lesions identified    Neurologic/Psychiatric  · Mental Status:  · Orientation: grossly oriented to person, place and time  · Mood and Affect: mood normal, affect appropriate    KOH  Hypae: negative  Buds: negative    Wet Prep:  Trich: negative  Clue cells: negative  Hyphae: few  Buds: negative  WBC's: many    Assessment/Plan:  38 y.o. [id number] perimenopausal female with pmh of AUB and chronic pelvic pain now presenting with complaints of dysuria, back pain, vaginal discharge and vulvovaginal pruritis/odor x 1 wk. +suprapubic/bladder tenderness on exam. +rare yeast on wet prep, but also with many WBCs and brightly positive nitrazine. Unclear clinical picture. Cannot exclude chronic PID.  Prior pelvic US wnl.    -urine dip wnl, given symptomatic will send culture  -wet prep with rare hyphae and many wbcs --> will treat for yeast --> Rx for diflucan provided --> advised pt return if symptoms not improving after treatment  -consider repeating pelvic ultrasound if pelvic discomfort and d/c persist  -consider EMBx to evaluate for chronic endometritis  -encouraged good hygiene, avoidance of irritants, etc    RTC: for AE or sooner abrahan Goyal MD  2019  3:12 PM

## 2019-05-09 LAB
BACTERIA UR CULT: ABNORMAL
BACTERIA UR CULT: ABNORMAL

## 2019-05-10 RX ORDER — AMOXICILLIN 500 MG/1
500 CAPSULE ORAL 2 TIMES DAILY
Qty: 14 CAP | Refills: 0 | Status: SHIPPED | OUTPATIENT
Start: 2019-05-10 | End: 2019-05-17

## 2019-05-14 ENCOUNTER — OFFICE VISIT (OUTPATIENT)
Dept: INTERNAL MEDICINE CLINIC | Age: 44
End: 2019-05-14

## 2019-05-14 VITALS
BODY MASS INDEX: 26.39 KG/M2 | DIASTOLIC BLOOD PRESSURE: 76 MMHG | RESPIRATION RATE: 16 BRPM | HEART RATE: 74 BPM | OXYGEN SATURATION: 95 % | HEIGHT: 60 IN | SYSTOLIC BLOOD PRESSURE: 109 MMHG | WEIGHT: 134.4 LBS | TEMPERATURE: 97.5 F

## 2019-05-14 DIAGNOSIS — M25.562 CHRONIC PAIN OF BOTH KNEES: Primary | ICD-10-CM

## 2019-05-14 DIAGNOSIS — G89.29 CHRONIC MIDLINE BACK PAIN, UNSPECIFIED BACK LOCATION: ICD-10-CM

## 2019-05-14 DIAGNOSIS — M54.9 CHRONIC MIDLINE BACK PAIN, UNSPECIFIED BACK LOCATION: ICD-10-CM

## 2019-05-14 DIAGNOSIS — K21.9 GASTROESOPHAGEAL REFLUX DISEASE WITHOUT ESOPHAGITIS: ICD-10-CM

## 2019-05-14 DIAGNOSIS — F43.10 PTSD (POST-TRAUMATIC STRESS DISORDER): ICD-10-CM

## 2019-05-14 DIAGNOSIS — G89.29 CHRONIC PAIN OF BOTH KNEES: Primary | ICD-10-CM

## 2019-05-14 DIAGNOSIS — M47.816 ARTHRITIS OF LUMBAR SPINE: ICD-10-CM

## 2019-05-14 DIAGNOSIS — M25.561 CHRONIC PAIN OF BOTH KNEES: Primary | ICD-10-CM

## 2019-05-14 RX ORDER — FLUCONAZOLE 150 MG/1
TABLET ORAL
COMMUNITY
Start: 2019-05-07 | End: 2019-05-14 | Stop reason: ALTCHOICE

## 2019-05-14 RX ORDER — CYCLOBENZAPRINE HCL 10 MG
10 TABLET ORAL
COMMUNITY
Start: 2019-01-11 | End: 2019-08-13 | Stop reason: ALTCHOICE

## 2019-05-14 NOTE — PROGRESS NOTES
#[id number]    Identified pt with two pt identifiers(name and ). Reviewed record in preparation for visit and have obtained necessary documentation. All patient medications has been reviewed. Chief Complaint   Patient presents with    Knee Pain     3 month f/u    GERD       There are no preventive care reminders to display for this patient. Vitals:    19 1048   BP: 109/76   Pulse: 74   Resp: 16   Temp: 97.5 °F (36.4 °C)   TempSrc: Oral   SpO2: 95%   Weight: 134 lb 6.4 oz (61 kg)   Height: 5' (1.524 m)   LMP: 2019       Coordination of Care Questionnaire:   1) Have you been to an emergency room, urgent care, or hospitalized since your last visit?   no       2. Have seen or consulted any other health care provider since your last visit? YES  19 OBGYN for dysuria  3) Do you have an Advanced Directive/ Living Will in place? NO  If yes, do we have a copy on file NO  If no, would you like information NO    Patient is accompanied by self I have received verbal consent from [address] [place] [address] to discuss any/all medical information while they are present in the room.

## 2019-08-13 ENCOUNTER — OFFICE VISIT (OUTPATIENT)
Dept: INTERNAL MEDICINE CLINIC | Age: 44
End: 2019-08-13

## 2019-08-13 VITALS
BODY MASS INDEX: 28.58 KG/M2 | OXYGEN SATURATION: 98 % | HEIGHT: 60 IN | HEART RATE: 71 BPM | TEMPERATURE: 98.2 F | RESPIRATION RATE: 16 BRPM | SYSTOLIC BLOOD PRESSURE: 128 MMHG | WEIGHT: 145.6 LBS | DIASTOLIC BLOOD PRESSURE: 78 MMHG

## 2019-08-13 DIAGNOSIS — M79.7 FIBROMYALGIA SYNDROME: ICD-10-CM

## 2019-08-13 DIAGNOSIS — G89.29 CHRONIC PAIN OF BOTH KNEES: Primary | ICD-10-CM

## 2019-08-13 DIAGNOSIS — K21.9 GASTROESOPHAGEAL REFLUX DISEASE WITHOUT ESOPHAGITIS: ICD-10-CM

## 2019-08-13 DIAGNOSIS — M25.561 CHRONIC PAIN OF BOTH KNEES: Primary | ICD-10-CM

## 2019-08-13 DIAGNOSIS — F43.10 PTSD (POST-TRAUMATIC STRESS DISORDER): ICD-10-CM

## 2019-08-13 DIAGNOSIS — M25.562 CHRONIC PAIN OF BOTH KNEES: Primary | ICD-10-CM

## 2019-08-13 RX ORDER — PANTOPRAZOLE SODIUM 40 MG/1
40 TABLET, DELAYED RELEASE ORAL DAILY
Qty: 30 TAB | Refills: 5 | Status: SHIPPED | OUTPATIENT
Start: 2019-08-13 | End: 2019-11-15 | Stop reason: SDUPTHER

## 2019-08-13 RX ORDER — DICLOFENAC SODIUM 10 MG/G
GEL TOPICAL 4 TIMES DAILY
Qty: 100 G | Refills: 5 | Status: SHIPPED | OUTPATIENT
Start: 2019-08-13 | End: 2019-11-15 | Stop reason: SDUPTHER

## 2019-08-13 NOTE — PROGRESS NOTES
Chief Complaint   Patient presents with     Port Draw     Labs drawn via PORT by RN in lab. Estephania elfushed with saline and heparin. VS taken.      Ross Borja RN     PT DAILY TREATMENT NOTE - [facility] 2-15    Patient Name: Davidson Boyd  Date:2018  : 1975  [x]  Patient  Verified  Payor: [address] / Plan: [facility] / Product Type: Managed Care Medicaid /    In time:940 A  Out time:1030 A  Total Treatment Time (min): 50  Total Timed Codes (min): 50   Visit #: 4    Treatment Area: Mid back pain [M54.9]  Bilateral knee pain [M25.561, M25.562]    SUBJECTIVE    Pt speaks [place], zero [place]. Most of subjective/objective portion with pt was via [employer] . Pain Level (0-10 scale): 7  Any medication changes, allergies to medications, adverse drug reactions, diagnosis change, or new procedure performed?: [x] No    [] Yes (see summary sheet for update)  Subjective functional status/changes:       Patient reporting she has been doing her exercises but they don't seem to be helping, her pain is the same. OBJECTIVE    Modality rationale: decrease edema, decrease inflammation, decrease pain and reduce soreness post therapy in order to improve the patients ability to perform ADL's. Min Type Additional Details   At home []  Ice     []  Heat   Position:   Location:     [x] Skin assessment post-treatment:  [x]intact []redness- no adverse reaction    []redness  adverse reaction:     50 min Therapeutic Exercise:  [x] See flow sheet : re-assessment performed   Rationale: increase ROM, increase strength and improve functional mobility to improve the patients ability to walk and perform ADL's    With   [x] TE   [] TA   [] neuro   [] manual: Patient Education: [x] Review HEP    [] Progressed/Changed HEP based on:   [] positioning   [] body mechanics   [] transfers   [] heat/ice application    [x] other: reviewed suggestions of trying to stay active to release natural pain relieving endorphins as well as reviewed postural principles while standing.       Other Objective/Functional Measures:     ROM: AROM and PROM henry knee flexion and ext WNL, pain with PROM ext only henry.     Strength  5/5 unless noted below     Hip Flexion 3+     Extension 3+     Abduction 3+     Adduction 3+     IR 3+     ER 3+   Knee Flexion 4     Extension 4      Tenderness to palpation: henry quad, HS     Joint Mobility: hypomobility noted tibial post glide.       Mod edema noted (R) knee     LE Flexibility:  Dec HS, gastroc henry     Special Tests: all special tests for ligamentous pathology and meniscal pathology WNL          Pain Level (0-10 scale) post treatment: 7    ASSESSMENT    Progress towards goals / Updated goals:  Short Term Goals: To be accomplished in 5 treatments:  -Independent in HEP as evidenced on ability to perform at least 5 exercises from HEP using proper form without verbal cuing. -MET  -Pain less than or equal to 7/10 at worst to allow patient to perform ADL's with greater ease-NOT MET     Long Term Goals: To be accomplished in 10 treatments:  -Pt will be able to go up and down the stairs without pain-NOT MET  -MMT greater than or equal to 4-/5 all planes to allow patient to perform ADL's-NOT MET  -Pain 0-3/10 to allow patient to walk for 2 hours without pain-NOT MET    Pt with continued high pain levels, decreased functional ability, and decreased strength, despite compliance to HEP. Pt has not progressed toward goals. Recommend pt follow up with MD for assessment of current symptom status to determine next steps in her POC. PLAN  []  Upgrade activities as tolerated     [x]  Continue plan of care  []  Update interventions per flow sheet       []  Discharge due to:_  [x]  Other:_  Pt to follow up with MD Viry Lomeli.  Moshe, PT   5/31/2018

## 2019-08-13 NOTE — PROGRESS NOTES
Juan Diego Louise is a 38 y.o. female    Chief Complaint   Patient presents with    Knee Pain    Depression    Arthritis     1. Have you been to the ER, urgent care clinic since your last visit? Hospitalized since your last visit? No     2. Have you seen or consulted any other health care providers outside of the [address] [place] [address] since your last visit? Include any pap smears or colon screening.   No   Visit Vitals  /78 (BP 1 Location: Right arm, BP Patient Position: Sitting)   Pulse 71   Temp 98.2 °F (36.8 °C) (Oral)   Resp 16   Ht 5' (1.524 m)   Wt 145 lb 9.6 oz (66 kg)   SpO2 98%   BMI 28.44 kg/m²

## 2019-08-13 NOTE — PROGRESS NOTES
HISTORY OF PRESENT ILLNESS  Luther Driscoll is a 38 y.o. female. Pt. comes in for f/u. Has multiple medical problems. Continues to have chronic arthritic pain of both knees. Current medications including diclofenac and gel help. Saw orthopedic MD/Dr. Magdalena Cain who recommended surgery. She canceled surgery because they told her to sign a consent and except possible risks. Her depression/PTSD has been stable. Her GI issues are stable on Protonix. No tobacco or alcohol use. Has 5 children. Her older son works. One daughter is  back in [place]. The other 3 younger kids go to school. She is not working right now. Asking me if I can get her permanent disability. Reports compliance with medications and diet. Med list and most recent labs/studies reviewed with pt. Trying to be active physically as tolerated. Needs med refills. Reports no other new c/o. HPI    Review of Systems   Constitutional: Negative. HENT: Negative. Eyes: Negative. Respiratory: Negative for shortness of breath. Cardiovascular: Negative for chest pain and leg swelling. Gastrointestinal: Positive for heartburn. Negative for abdominal pain. Genitourinary: Negative for dysuria and frequency. Musculoskeletal: Positive for back pain, joint pain and myalgias. Negative for falls and neck pain. Skin: Negative. Neurological: Negative for dizziness, sensory change, focal weakness and headaches. Endo/Heme/Allergies: Negative. Psychiatric/Behavioral: Positive for depression. Negative for suicidal ideas. The patient is nervous/anxious and has insomnia. All other systems reviewed and are negative. Physical Exam   Constitutional: She is oriented to person, place, and time. She appears well-developed and well-nourished. No distress. HENT:   Head: Normocephalic and atraumatic. Mouth/Throat: Oropharynx is clear and moist.   Eyes: Conjunctivae are normal. No scleral icterus. Neck: Normal range of motion.  Neck supple. No JVD present. No thyromegaly present. Cardiovascular: Normal rate, regular rhythm and intact distal pulses. No murmur heard. Pulmonary/Chest: Effort normal and breath sounds normal. No respiratory distress. She has no wheezes. She has no rales. Abdominal: Soft. Bowel sounds are normal. She exhibits no distension. There is no tenderness. Musculoskeletal: She exhibits tenderness (Bilateral knees, lumbars, chronic). She exhibits no edema. DJD   Neurological: She is alert and oriented to person, place, and time. Coordination normal.   Skin: Skin is warm and dry. No rash noted. Psychiatric: Her behavior is normal.   Chronically depressed/stressed   Nursing note and vitals reviewed. ASSESSMENT and PLAN  Diagnoses and all orders for this visit:    1. Chronic pain of both knees    2. Gastroesophageal reflux disease without esophagitis    3. PTSD (post-traumatic stress disorder)    4. Fibromyalgia syndrome    Other orders  -     pantoprazole (PROTONIX) 40 mg tablet; Take 1 Tab by mouth daily. -     diclofenac (VOLTAREN) 1 % gel; Apply  to affected area four (4) times daily. Follow-up and Dispositions    · Return in about 4 months (around 12/13/2019).      lab results and schedule of future lab studies reviewed with patient  reviewed diet, exercise and weight control  reviewed medications and side effects in detail  F/u with other MD's as scheduled  Advised patient to follow-up with orthopedic MD  Most likely she will need knee surgery/TKR down the road

## 2019-11-15 ENCOUNTER — OFFICE VISIT (OUTPATIENT)
Dept: INTERNAL MEDICINE CLINIC | Age: 44
End: 2019-11-15

## 2019-11-15 VITALS
OXYGEN SATURATION: 97 % | HEIGHT: 60 IN | WEIGHT: 148.4 LBS | TEMPERATURE: 97.7 F | SYSTOLIC BLOOD PRESSURE: 128 MMHG | RESPIRATION RATE: 18 BRPM | DIASTOLIC BLOOD PRESSURE: 78 MMHG | BODY MASS INDEX: 29.13 KG/M2 | HEART RATE: 77 BPM

## 2019-11-15 DIAGNOSIS — M79.7 FIBROMYALGIA SYNDROME: ICD-10-CM

## 2019-11-15 DIAGNOSIS — G89.29 CHRONIC PAIN OF BOTH KNEES: Primary | ICD-10-CM

## 2019-11-15 DIAGNOSIS — F43.10 PTSD (POST-TRAUMATIC STRESS DISORDER): ICD-10-CM

## 2019-11-15 DIAGNOSIS — M25.562 CHRONIC PAIN OF BOTH KNEES: Primary | ICD-10-CM

## 2019-11-15 DIAGNOSIS — M25.561 CHRONIC PAIN OF BOTH KNEES: Primary | ICD-10-CM

## 2019-11-15 DIAGNOSIS — K21.9 GASTROESOPHAGEAL REFLUX DISEASE WITHOUT ESOPHAGITIS: ICD-10-CM

## 2019-11-15 RX ORDER — PANTOPRAZOLE SODIUM 40 MG/1
40 TABLET, DELAYED RELEASE ORAL DAILY
Qty: 30 TAB | Refills: 5 | Status: SHIPPED | OUTPATIENT
Start: 2019-11-15 | End: 2020-06-29 | Stop reason: SDUPTHER

## 2019-11-15 RX ORDER — AMITRIPTYLINE HYDROCHLORIDE 10 MG/1
10 TABLET, FILM COATED ORAL
Qty: 30 TAB | Refills: 5 | Status: SHIPPED | OUTPATIENT
Start: 2019-11-15 | End: 2020-12-28 | Stop reason: SDUPTHER

## 2019-11-15 RX ORDER — MELOXICAM 15 MG/1
15 TABLET ORAL DAILY
Qty: 30 TAB | Refills: 5 | Status: SHIPPED | OUTPATIENT
Start: 2019-11-15 | End: 2020-12-28 | Stop reason: ALTCHOICE

## 2019-11-15 RX ORDER — DICLOFENAC SODIUM 10 MG/G
GEL TOPICAL 4 TIMES DAILY
Qty: 100 G | Refills: 5 | Status: SHIPPED | OUTPATIENT
Start: 2019-11-15

## 2019-11-15 NOTE — PROGRESS NOTES
Health Maintenance Due   Topic Date Due    Influenza Age 5 to Adult  08/01/2019       Chief Complaint   Patient presents with    GERD     3 mo f/u    Pain (Chronic)    Constipation       1. Have you been to the ER, urgent care clinic since your last visit? Hospitalized since your last visit? No    2. Have you seen or consulted any other health care providers outside of the [address] [place] [address] since your last visit? Include any pap smears or colon screening. No    3) Do you have an Advance Directive on file? no    4) Are you interested in receiving information on Advance Directives? NO      Patient is accompanied by self I have received verbal consent from [facility] to discuss any/all medical information while they are present in the room.

## 2019-11-15 NOTE — PROGRESS NOTES
HISTORY OF PRESENT ILLNESS  Richie Gu is a 38 y.o. female. Pt. comes in for f/u. Has a few chronic medical issues as documented. Reports continued chronic pain in both knees, lower back and shoulders. Followed by orthopedic MD who has recommended knee replacements. Patient tells me she is not ready to do it. Medications help but she has run out. Continues to have GERD issues. Protonix helps. Followed by psychologist/psychiatrist for depression and PTSD. Not taking antidepressants anymore. Reports feeling stressed, depressed and having difficulty sleeping. PMH/PSH/Allergies/Social History/medication list and most recent studies reviewed with patient. Tobacco use: No  Alcohol use: No    Reports compliance with medications and diet. Trying to be active physically to control weight. Reports no other new c/o. HPI    Review of Systems   Constitutional: Negative. HENT: Negative. Eyes: Negative. Respiratory: Negative for shortness of breath. Cardiovascular: Negative for chest pain and leg swelling. Gastrointestinal: Positive for heartburn. Negative for abdominal pain. Genitourinary: Negative for dysuria and frequency. Musculoskeletal: Positive for back pain, joint pain and myalgias. Negative for falls and neck pain. Skin: Negative. Neurological: Negative for dizziness, sensory change, focal weakness and headaches. Endo/Heme/Allergies: Negative. Psychiatric/Behavioral: Positive for depression. Negative for suicidal ideas. The patient is nervous/anxious and has insomnia. All other systems reviewed and are negative. Physical Exam   Constitutional: She is oriented to person, place, and time. She appears well-developed and well-nourished. No distress. HENT:   Head: Normocephalic and atraumatic. Mouth/Throat: Oropharynx is clear and moist.   Eyes: Conjunctivae are normal. No scleral icterus. Neck: Normal range of motion. Neck supple. No JVD present.  No thyromegaly present. Cardiovascular: Normal rate, regular rhythm and intact distal pulses. No murmur heard. Pulmonary/Chest: Effort normal and breath sounds normal. No respiratory distress. She has no wheezes. She has no rales. Abdominal: Soft. Bowel sounds are normal. She exhibits no distension. There is no tenderness. Musculoskeletal: She exhibits tenderness (Bilateral knees, lumbars, chronic,,, left trapezius muscle/shoulder). She exhibits no edema. DJD   Neurological: She is alert and oriented to person, place, and time. Coordination normal.   Skin: Skin is warm and dry. No rash noted. Psychiatric: Her behavior is normal.   Chronically depressed/stressed   Nursing note and vitals reviewed. ASSESSMENT and PLAN  Diagnoses and all orders for this visit:    1. Chronic pain of both knees    2. Fibromyalgia syndrome    3. PTSD (post-traumatic stress disorder)    4. Gastroesophageal reflux disease without esophagitis    Other orders  -     pantoprazole (PROTONIX) 40 mg tablet; Take 1 Tab by mouth daily. -     diclofenac (VOLTAREN) 1 % gel; Apply  to affected area four (4) times daily. -     meloxicam (MOBIC) 15 mg tablet; Take 1 Tab by mouth daily.  -     Start amitriptyline (ELAVIL) 10 mg tablet; Take 1 Tab by mouth nightly. Follow-up and Dispositions    · Return in about 4 weeks (around 12/13/2019).      lab results and schedule of future lab studies reviewed with patient  reviewed diet, exercise and weight control  reviewed medications and side effects in detail  F/u with other MD's as scheduled

## 2019-12-16 ENCOUNTER — OFFICE VISIT (OUTPATIENT)
Dept: INTERNAL MEDICINE CLINIC | Age: 44
End: 2019-12-16

## 2019-12-16 VITALS
DIASTOLIC BLOOD PRESSURE: 60 MMHG | RESPIRATION RATE: 20 BRPM | HEIGHT: 60 IN | WEIGHT: 149.4 LBS | SYSTOLIC BLOOD PRESSURE: 98 MMHG | HEART RATE: 74 BPM | TEMPERATURE: 97.6 F | BODY MASS INDEX: 29.33 KG/M2 | OXYGEN SATURATION: 98 %

## 2019-12-16 DIAGNOSIS — K21.9 GASTROESOPHAGEAL REFLUX DISEASE WITHOUT ESOPHAGITIS: Primary | ICD-10-CM

## 2019-12-16 DIAGNOSIS — M25.562 CHRONIC PAIN OF BOTH KNEES: ICD-10-CM

## 2019-12-16 DIAGNOSIS — R53.83 FATIGUE, UNSPECIFIED TYPE: ICD-10-CM

## 2019-12-16 DIAGNOSIS — F43.10 PTSD (POST-TRAUMATIC STRESS DISORDER): ICD-10-CM

## 2019-12-16 DIAGNOSIS — M79.7 FIBROMYALGIA SYNDROME: ICD-10-CM

## 2019-12-16 DIAGNOSIS — G89.29 CHRONIC PAIN OF BOTH KNEES: ICD-10-CM

## 2019-12-16 DIAGNOSIS — M25.561 CHRONIC PAIN OF BOTH KNEES: ICD-10-CM

## 2019-12-16 NOTE — PROGRESS NOTES
HISTORY OF PRESENT ILLNESS  Armand Weiss is a 38 y.o. female. She is back for follow-up. Has a few chronic medical issues. Has chronic GI problems with GERD. Seen by GI MD.  Endoscopy reported as gastritis per patient. Remains on PPI which helps some. Certain foods make it worse. Denies eating fatty food but eats spicy food. Energy is chronically low. Has PTSD with depression. Has fibromyalgia syndrome as well as chronic bilateral knee pain. Orthopedic MD recommended surgery but she refused. She is  and has 4 children. She is not working because of her chronic issues. Denies tobacco or alcohol use. Needs repeat labs. Shayy Ask HPI    Review of Systems   Constitutional: Negative. HENT: Negative. Eyes: Negative. Respiratory: Negative for shortness of breath. Cardiovascular: Negative for chest pain and leg swelling. Gastrointestinal: Positive for abdominal pain and heartburn. Negative for blood in stool, constipation, diarrhea, melena, nausea and vomiting. Genitourinary: Negative for dysuria and frequency. Musculoskeletal: Positive for back pain, joint pain and myalgias. Negative for falls and neck pain. Skin: Negative. Neurological: Negative for dizziness, sensory change, focal weakness and headaches. Endo/Heme/Allergies: Negative. Psychiatric/Behavioral: Positive for depression. Negative for suicidal ideas. The patient is nervous/anxious and has insomnia. All other systems reviewed and are negative. Physical Exam  Vitals signs and nursing note reviewed. Constitutional:       General: She is not in acute distress. Appearance: She is well-developed. HENT:      Head: Normocephalic and atraumatic. Eyes:      General: No scleral icterus. Conjunctiva/sclera: Conjunctivae normal.   Neck:      Musculoskeletal: Normal range of motion and neck supple. Thyroid: No thyromegaly. Vascular: No JVD.    Cardiovascular:      Rate and Rhythm: Normal rate and regular rhythm. Heart sounds: No murmur. Pulmonary:      Effort: Pulmonary effort is normal. No respiratory distress. Breath sounds: Normal breath sounds. No wheezing or rales. Abdominal:      General: Bowel sounds are normal. There is no distension. Palpations: Abdomen is soft. Tenderness: There is no tenderness. Musculoskeletal:         General: Tenderness (Bilateral knees, lumbars, chronic,,, shoulders) present. Comments: DJD   Skin:     General: Skin is warm and dry. Findings: No rash. Neurological:      Mental Status: She is alert and oriented to person, place, and time. Coordination: Coordination normal.   Psychiatric:         Behavior: Behavior normal.      Comments: Chronically depressed/stressed         ASSESSMENT and PLAN  Diagnoses and all orders for this visit:    1. Gastroesophageal reflux disease without esophagitis    2. Fatigue, unspecified type  -     LIPID PANEL  -     METABOLIC PANEL, COMPREHENSIVE  -     CBC W/O DIFF  -     TSH 3RD GENERATION    3. PTSD (post-traumatic stress disorder)    4. Fibromyalgia syndrome    5. Chronic pain of both knees      Follow-up and Dispositions    · Return in about 4 months (around 4/16/2020). lab results and schedule of future lab studies reviewed with patient  reviewed diet, exercise and weight control  reviewed medications and side effects in detail  F/u with other MD's as scheduled  Advised patient to lose weight by watching diet (decreasing sugars/carbs/fat, increasing fruits/vegetables), exercising at least 30 minutes daily, getting 7-8 hours of sleep daily, drinking plenty of water, and decreasing stress  .

## 2019-12-16 NOTE — PROGRESS NOTES
There are no preventive care reminders to display for this patient. Chief Complaint   Patient presents with    GERD     1 mo f/u    Pain (Chronic)    Constipation       1. Have you been to the ER, urgent care clinic since your last visit? Hospitalized since your last visit? No    2. Have you seen or consulted any other health care providers outside of the [address] [place] [address] since your last visit? Include any pap smears or colon screening. No    3) Do you have an Advance Directive on file? no    4) Are you interested in receiving information on Advance Directives? NO      Patient is accompanied by self I have received verbal consent from [facility] to discuss any/all medical information while they are present in the room.

## 2019-12-17 LAB
ALBUMIN SERPL-MCNC: 4.3 G/DL (ref 3.5–5.5)
ALBUMIN/GLOB SERPL: 1.5 {RATIO} (ref 1.2–2.2)
ALP SERPL-CCNC: 57 IU/L (ref 39–117)
ALT SERPL-CCNC: 11 IU/L (ref 0–32)
AST SERPL-CCNC: 13 IU/L (ref 0–40)
BILIRUB SERPL-MCNC: 0.4 MG/DL (ref 0–1.2)
BUN SERPL-MCNC: 11 MG/DL (ref 6–24)
BUN/CREAT SERPL: 18 (ref 9–23)
CALCIUM SERPL-MCNC: 9.1 MG/DL (ref 8.7–10.2)
CHLORIDE SERPL-SCNC: 102 MMOL/L (ref 96–106)
CHOLEST SERPL-MCNC: 162 MG/DL (ref 100–199)
CO2 SERPL-SCNC: 22 MMOL/L (ref 20–29)
CREAT SERPL-MCNC: 0.61 MG/DL (ref 0.57–1)
ERYTHROCYTE [DISTWIDTH] IN BLOOD BY AUTOMATED COUNT: 12.5 % (ref 12.3–15.4)
GLOBULIN SER CALC-MCNC: 2.8 G/DL (ref 1.5–4.5)
GLUCOSE SERPL-MCNC: 94 MG/DL (ref 65–99)
HCT VFR BLD AUTO: 39.8 % (ref 34–46.6)
HDLC SERPL-MCNC: 57 MG/DL
HGB BLD-MCNC: 12.9 G/DL (ref 11.1–15.9)
INTERPRETATION, 910389: NORMAL
LDLC SERPL CALC-MCNC: 87 MG/DL (ref 0–99)
MCH RBC QN AUTO: 28.2 PG (ref 26.6–33)
MCHC RBC AUTO-ENTMCNC: 32.4 G/DL (ref 31.5–35.7)
MCV RBC AUTO: 87 FL (ref 79–97)
PLATELET # BLD AUTO: 295 X10E3/UL (ref 150–450)
POTASSIUM SERPL-SCNC: 4.5 MMOL/L (ref 3.5–5.2)
PROT SERPL-MCNC: 7.1 G/DL (ref 6–8.5)
RBC # BLD AUTO: 4.58 X10E6/UL (ref 3.77–5.28)
SODIUM SERPL-SCNC: 136 MMOL/L (ref 134–144)
TRIGL SERPL-MCNC: 88 MG/DL (ref 0–149)
TSH SERPL DL<=0.005 MIU/L-ACNC: 1.92 UIU/ML (ref 0.45–4.5)
VLDLC SERPL CALC-MCNC: 18 MG/DL (ref 5–40)
WBC # BLD AUTO: 5.9 X10E3/UL (ref 3.4–10.8)

## 2020-01-29 ENCOUNTER — TELEPHONE (OUTPATIENT)
Dept: OBGYN CLINIC | Age: 45
End: 2020-01-29

## 2020-01-29 ENCOUNTER — HOSPITAL ENCOUNTER (OUTPATIENT)
Dept: MAMMOGRAPHY | Age: 45
Discharge: HOME | End: 2020-01-29
Attending: ADVANCED PRACTICE MIDWIFE
Payer: MEDICAID

## 2020-01-29 ENCOUNTER — OFFICE VISIT (OUTPATIENT)
Dept: OBGYN CLINIC | Age: 45
End: 2020-01-29

## 2020-01-29 VITALS — WEIGHT: 148 LBS | BODY MASS INDEX: 28.9 KG/M2 | SYSTOLIC BLOOD PRESSURE: 128 MMHG | DIASTOLIC BLOOD PRESSURE: 88 MMHG

## 2020-01-29 DIAGNOSIS — N63.0 BREAST LUMP: ICD-10-CM

## 2020-01-29 DIAGNOSIS — Z87.898 HISTORY OF BREAST PROBLEM: Primary | ICD-10-CM

## 2020-01-29 DIAGNOSIS — N63.0 BREAST LUMP: Primary | ICD-10-CM

## 2020-01-29 PROCEDURE — 77066 DX MAMMO INCL CAD BI: CPT

## 2020-01-29 PROCEDURE — 76642 ULTRASOUND BREAST LIMITED: CPT

## 2020-01-29 NOTE — PROGRESS NOTES
Breast Lump Evaluation    Carine Landon is a No obstetric history on file. ,  54 y.o. female  whose Patient's last menstrual period was 01/20/2020 (exact date). .    She presents with breast lump located in the bilateral breast on the sides that she believes is an enlarge lymph node. She noticed it 1 months ago. The lump was tender and has not changed in size. Now reports no longer feels pain or lumps and feels as if it resolved since her and her family said Rastafari prayers. Kept appt since she has not had a Mammogram in over a year. The patient has not noticed changes in the area of the lump: No dimpling, nipple retraction or discharge. No masses or nodes. .    The patient notes the following associated symptoms: none                                                                                                                                                  She notes that the following factors improve her symptoms: none                                                                                                                             She notes that the following factors aggravate her symptoms:none                                                                                                                                                                                        She has had any diagnostic studies for this problem since she noticed it.                                                                                                                                                                            In regards to breast problems her medical history is significant for the following: none                                                                                                                                                      There is not a family history of breast cancer in a first and second degree relative. 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 Saul  Objective:    Visit Vitals  /88   Wt 148 lb (67.1 kg)   LMP 01/20/2020 (Exact Date)   BMI 28.90 kg/m²       Physical Exam:    Constitutional  · Appearance: well-nourished, well developed, alert, in no acute distress    HENT  · Head and Face: appears normal    Neck  · Inspection/Palpation: normal appearance, no masses or tenderness  · Lymph Nodes: no lymphadenopathy present  ·     Breasts  · Inspection of Breasts: breasts symmetrical, no skin changes, no discharge present, nipple appearance normal, no skin retraction present  · Palpation of Breasts and Axillae: no masses present on palpation, no breast tenderness  · Axillary Lymph Nodes: no lymphadenopathy present    Skin  · General Inspection: no rash, no lesions identified    Neurologic/Psychiatric  · Mental Status:  · Orientation: grossly oriented to person, place and time  · Mood and Affect: mood normal, affect appropriate    Assessment:  Normal breast exam      Plan:  Screening bilat mammogram    KEYSHA Seymour/RATNA

## 2020-01-29 NOTE — TELEPHONE ENCOUNTER
Call received at 2:25pM        54year old patient last seen in the office today for  Breast lump. Kenyon Lai calling ( [employer]) to say that and this nurse spoke to person in mammogram at [facility] imaging that they need an order for diagnostic mammogram instead of a screening mammogram due history of breast lump      This nurse placed a order for bilateral diagnostic mammogram per nurse mid wife ALEKS Downey. The order was then faxed to the provided fax number of [phone]    Confirmation received.

## 2020-04-20 ENCOUNTER — VIRTUAL VISIT (OUTPATIENT)
Dept: INTERNAL MEDICINE CLINIC | Age: 45
End: 2020-04-20

## 2020-04-20 VITALS — HEIGHT: 60 IN | BODY MASS INDEX: 28.9 KG/M2

## 2020-04-20 DIAGNOSIS — K21.9 GASTROESOPHAGEAL REFLUX DISEASE WITHOUT ESOPHAGITIS: ICD-10-CM

## 2020-04-20 DIAGNOSIS — F43.10 PTSD (POST-TRAUMATIC STRESS DISORDER): ICD-10-CM

## 2020-04-20 DIAGNOSIS — M79.7 FIBROMYALGIA SYNDROME: ICD-10-CM

## 2020-04-20 DIAGNOSIS — M54.2 NECK PAIN: Primary | ICD-10-CM

## 2020-04-20 DIAGNOSIS — F33.41 RECURRENT MAJOR DEPRESSIVE DISORDER, IN PARTIAL REMISSION (HCC): ICD-10-CM

## 2020-04-20 NOTE — PROGRESS NOTES
Consent: Az Rawls, who was seen by synchronous (real-time) audio-video technology, and/or her healthcare decision maker, is aware that this patient-initiated, Telehealth encounter on 4/20/2020 is a billable service, with coverage as determined by her insurance carrier. She is aware that she may receive a bill and has provided verbal consent to proceed: Yes. Assessment & Plan:   Diagnoses and all orders for this visit:    1. Neck pain    2. Fibromyalgia syndrome    3. Gastroesophageal reflux disease without esophagitis    4. PTSD (post-traumatic stress disorder)    5. Recurrent major depressive disorder, in partial [facility] ([facility]          Follow-up and Dispositions    Return in about 4 weeks (around 5/18/2020). I spent at least 15 minutes with this established patient, and >50% of the time was spent counseling and/or coordinating care regarding neck pain, depression, gers,.  712  Subjective:   Az Rawls is a 54 y.o. female who was seen for Pain (Chronic) (4 month follow up) and Depression. Patient continues to have chronic pain especially in the neck, back and knees. Has fibromyalgia syndrome. Mobic and Voltaren gel helps some. Also issues with chronic PTSD and depression. Amitriptyline 10 mg nightly has helped. Continues to have GERD issues. PPI helps. She is unable to work because of pain. Wants me to renew note for her inability to work. Trying to get disability. Has a few children living with her. Med list and most recent studies reviewed. No other new complaints. Advised patient to increase amitriptyline from 10 to 20 mg nightly  Continue other meds  Follow-up with other MDs as scheduled  Advised her to remain active physically and watch her diet  We will write her another note for disability  COVID-19 precautions discussed with pt  Follow-up 1 month or as needed      Prior to Admission medications    Medication Sig Start Date End Date Taking?  Authorizing Provider pantoprazole (PROTONIX) 40 mg tablet Take 1 Tab by mouth daily. 11/15/19  Yes Tejas Pearl,    diclofenac (VOLTAREN) 1 % gel Apply  to affected area four (4) times daily. 11/15/19  Yes Tejas Pearl,    meloxicam (MOBIC) 15 mg tablet Take 1 Tab by mouth daily. 11/15/19  Yes Sebas Pearl,    amitriptyline (ELAVIL) 10 mg tablet Take 1 Tab by mouth nightly. 11/15/19  Yes Jose Mo DO     No Known Allergies    Patient Active Problem List    Diagnosis Date Noted    Arthritis of lumbar spine 05/14/2019    Pelvic pain     Ovarian cyst     Constipation     Fibromyalgia syndrome 06/29/2018    Plantar wart, left foot 06/29/2018    Arthralgia 05/01/2018    PTSD (post-traumatic stress disorder) 05/01/2018    Chronic midline low back pain with right-sided sciatica 04/10/2018    Chronic pain of both knees 04/10/2018    [facility] 04/10/2018    Gastroesophageal reflux disease without esophagitis 04/10/2018    Irregular periods/menstrual cycles 04/10/2018    DDD (degenerative disc disease), lumbar 03/08/2018     Current Outpatient Medications   Medication Sig Dispense Refill    pantoprazole (PROTONIX) 40 mg tablet Take 1 Tab by mouth daily. 30 Tab 5    diclofenac (VOLTAREN) 1 % gel Apply  to affected area four (4) times daily. 100 g 5    meloxicam (MOBIC) 15 mg tablet Take 1 Tab by mouth daily. 30 Tab 5    amitriptyline (ELAVIL) 10 mg tablet Take 1 Tab by mouth nightly.  30 Tab 5     No Known Allergies  Social History     Tobacco Use    Smoking status: Never Smoker    Smokeless tobacco: Never Used   Substance Use Topics    Alcohol use: No       ROS        Objective:   Vital Signs: (As obtained by patient/caregiver at home)  Visit Vitals  Height 5' (1.524 m)   Last Menstrual Period 04/04/2020   Body Mass Index 28.90 kg/m²        [INSTRUCTIONS:  \"[x]\" Indicates a positive item  \"[]\" Indicates a negative item  -- DELETE ALL ITEMS NOT EXAMINED]    Constitutional: [x] Appears well-developed and well-nourished [x] No apparent distress      [] Abnormal -     Mental status: [x] Alert and awake  [x] Oriented to person/place/time [x] Able to follow commands    [] Abnormal -     Eyes:   EOM    [x]  Normal    [] Abnormal -   Sclera  [x]  Normal    [] Abnormal -          Discharge [x]  None visible   [] Abnormal -     HENT: [x] Normocephalic, atraumatic  [] Abnormal -   [x] Mouth/Throat: Mucous membranes are moist    External Ears [x] Normal  [] Abnormal -    Neck: [x] No visualized mass [] Abnormal -     Pulmonary/Chest: [x] Respiratory effort normal   [x] No visualized signs of difficulty breathing or respiratory distress        [] Abnormal -      Musculoskeletal:   [x] Normal gait with no signs of ataxia         [x] Normal range of motion of neck        [] Abnormal -     Neurological:        [x] No Facial Asymmetry (Cranial nerve 7 motor function) (limited exam due to video visit)          [x] No gaze palsy        [] Abnormal -          Skin:        [x] No significant exanthematous lesions or discoloration noted on facial skin         [] Abnormal -            Psychiatric:       [x] Normal Affect [] Abnormal -        [x] No Hallucinations    Other pertinent observable physical exam findings:-        We discussed the expected course, resolution and complications of the diagnosis(es) in detail. Medication risks, benefits, costs, interactions, and alternatives were discussed as indicated. I advised her to contact the office if her condition worsens, changes or fails to improve as anticipated. She expressed understanding with the diagnosis(es) and plan. Saul Romero is a 54 y.o. female being evaluated by a video visit encounter for concerns as above. A caregiver was present when appropriate.  Due to this being a TeleHealth encounter (During TIEQC-62 public health emergency), evaluation of the following organ systems was limited: Vitals/Constitutional/EENT/Resp/CV/GI//MS/Neuro/Skin/Heme-Lymph-Imm. Pursuant to the emergency declaration under the [address] [place] [address], [facility] waiver authority and the [employer] Act, this Virtual  Visit was conducted, with patient's (and/or legal guardian's) consent, to reduce the patient's risk of exposure to COVID-19 and provide necessary medical care. Services were provided through a video synchronous discussion virtually to substitute for in-person clinic visit. Patient and provider were located at their individual homes.         Lainey Velazquez, DO

## 2020-04-20 NOTE — PROGRESS NOTES
There are no preventive care reminders to display for this patient. Chief Complaint   Patient presents with    Pain (Chronic)     4 month follow up    Depression       1. Have you been to the ER, urgent care clinic since your last visit? Hospitalized since your last visit? No    2. Have you seen or consulted any other health care providers outside of the [address] [place] [address] since your last visit? Include any pap smears or colon screening. No    3) Do you have an Advance Directive on file? no    4) Are you interested in receiving information on Advance Directives? NO      Patient is accompanied by self I have received verbal consent from [facility] to discuss any/all medical information while they are present in the room.

## 2020-06-29 ENCOUNTER — VIRTUAL VISIT (OUTPATIENT)
Dept: INTERNAL MEDICINE CLINIC | Age: 45
End: 2020-06-29

## 2020-06-29 DIAGNOSIS — F43.10 PTSD (POST-TRAUMATIC STRESS DISORDER): ICD-10-CM

## 2020-06-29 DIAGNOSIS — K21.9 GASTROESOPHAGEAL REFLUX DISEASE WITHOUT ESOPHAGITIS: ICD-10-CM

## 2020-06-29 DIAGNOSIS — M79.7 FIBROMYALGIA SYNDROME: ICD-10-CM

## 2020-06-29 DIAGNOSIS — M17.11 PRIMARY OSTEOARTHRITIS OF RIGHT KNEE: Primary | ICD-10-CM

## 2020-06-29 RX ORDER — PANTOPRAZOLE SODIUM 40 MG/1
40 TABLET, DELAYED RELEASE ORAL DAILY
Qty: 30 TAB | Refills: 5 | Status: SHIPPED | OUTPATIENT
Start: 2020-06-29 | End: 2020-12-28 | Stop reason: SDUPTHER

## 2020-06-29 NOTE — PROGRESS NOTES
Luis Sneed is a 54 y.o. female who was seen by synchronous (real-time) audio-video technology on 6/29/2020. Consent: Luis Sneed, who was seen by synchronous (real-time) audio-video technology, and/or her healthcare decision maker, is aware that this patient-initiated, Telehealth encounter on 6/29/2020 is a billable service, with coverage as determined by her insurance carrier. She is aware that she may receive a bill and has provided verbal consent to proceed: Yes. Assessment & Plan:   Diagnoses and all orders for this visit:    1. Primary osteoarthritis of right knee    2. Fibromyalgia syndrome    3. Gastroesophageal reflux disease without esophagitis    4. PTSD (post-traumatic stress disorder)    Other orders  -     pantoprazole (PROTONIX) 40 mg tablet; Take 1 Tab by mouth daily. I spent at least 25 minutes on this visit with this established patient. Subjective:   Luis Sneed is a 54 y.o. female who was seen for Fibromyalgia (states pain is in knees, back and shoulder 7/10 ); Pain (Chronic); and Post Traumatic Stress Disorder    Patient reports continued chronic pain in knees, right more than left, back and shoulders. Mobic and Voltaren gel helps some. Has seen orthopedic MD who is recommended right TKR. X-ray of knee showed significant DJD. Patient is hesitant and concerned about potential risk of surgery. Pain is affecting her walking and ADLs. Continues have issues with GERD. Protonix helps some. Needs a refill. Also has chronic depression/PTSD. Remains on Elavil nightly. Tells me she has not seen her psychiatrist in a while. Med list and most recent studies reviewed. Denies tobacco or alcohol use. She is . Has children living with her. She is unemployed. Trying to get on disability. Wants me to write another letter for her disability. Trying to be as active as tolerated. Taking precautions about COVID-19. Denies any related signs or symptoms.   No other new complaints. Plan:  Continue current medications  Refill Protonix  Advised patient to get back with orthopedic MD and consider having surgery because there is no other medical options to help for chronic pain  Advised patient to follow-up with her psychiatrist  COVID-Tasha precautions discussed with pt  Told her to send me the form for short-term disability to fill out  Follow-up 3 months or as needed  Prior to Admission medications    Medication Sig Start Date End Date Taking? Authorizing Provider   pantoprazole (PROTONIX) 40 mg tablet Take 1 Tab by mouth daily. 6/29/20  Yes Tejas Pearl DO   diclofenac (VOLTAREN) 1 % gel Apply  to affected area four (4) times daily. 11/15/19  Yes Tejas Pearl DO   meloxicam (MOBIC) 15 mg tablet Take 1 Tab by mouth daily. 11/15/19  Yes David Pearl,    amitriptyline (ELAVIL) 10 mg tablet Take 1 Tab by mouth nightly. Patient taking differently: Take 20 mg by mouth nightly. 11/15/19  Yes Tejas Pearl DO   pantoprazole (PROTONIX) 40 mg tablet Take 1 Tab by mouth daily.  11/15/19 6/29/20  Carmelita Babinski, DO     No Known Allergies    Patient Active Problem List    Diagnosis Date Noted    Primary osteoarthritis of right knee 06/29/2020    Arthritis of lumbar spine 05/14/2019    Pelvic pain     Ovarian cyst     Constipation     Fibromyalgia syndrome 06/29/2018    Plantar wart, left foot 06/29/2018    Arthralgia 05/01/2018    PTSD (post-traumatic stress disorder) 05/01/2018    Chronic midline low back pain with right-sided sciatica 04/10/2018    Chronic pain of both knees 04/10/2018    Recurrent major depressive disorder, in partial remission ([facility] 04/10/2018    Gastroesophageal reflux disease without esophagitis 04/10/2018    Irregular periods/menstrual cycles 04/10/2018    DDD (degenerative disc disease), lumbar 03/08/2018     Current Outpatient Medications   Medication Sig Dispense Refill    pantoprazole (PROTONIX) 40 mg tablet Take 1 Tab by mouth daily. 30 Tab 5    diclofenac (VOLTAREN) 1 % gel Apply  to affected area four (4) times daily. 100 g 5    meloxicam (MOBIC) 15 mg tablet Take 1 Tab by mouth daily. 30 Tab 5    amitriptyline (ELAVIL) 10 mg tablet Take 1 Tab by mouth nightly. (Patient taking differently: Take 20 mg by mouth nightly.) 30 Tab 5     No Known Allergies  Past Medical History:   Diagnosis Date    Arthritis     Chronic pain     Constipation     Depression     Ovarian cyst     Pelvic pain      Past Surgical History:   Procedure Laterality Date    COLONOSCOPY N/A 4/9/2019    COLONOSCOPY performed by Bree Mcfarland MD at [facility]OSCOPY     Social History     Tobacco Use    Smoking status: Never Smoker    Smokeless tobacco: Never Used   Substance Use Topics    Alcohol use: No       ROS    Objective:   Vital Signs: (As obtained by patient/caregiver at home)  There were no vitals taken for this visit.      [INSTRUCTIONS:  \"[x]\" Indicates a positive item  \"[]\" Indicates a negative item  -- DELETE ALL ITEMS NOT EXAMINED]    Constitutional: [x] Appears well-developed and well-nourished [x] No apparent distress      [] Abnormal -     Mental status: [x] Alert and awake  [x] Oriented to person/place/time [x] Able to follow commands    [] Abnormal -     Eyes:   EOM    [x]  Normal    [] Abnormal -   Sclera  [x]  Normal    [] Abnormal -          Discharge [x]  None visible   [] Abnormal -     HENT: [x] Normocephalic, atraumatic  [] Abnormal -   [x] Mouth/Throat: Mucous membranes are moist    External Ears [x] Normal  [] Abnormal -    Neck: [x] No visualized mass [] Abnormal -     Pulmonary/Chest: [x] Respiratory effort normal   [x] No visualized signs of difficulty breathing or respiratory distress        [] Abnormal -      Musculoskeletal:   [x] Normal gait with no signs of ataxia         [x] Normal range of motion of neck        [] Abnormal -     Neurological:        [x] No Facial Asymmetry (Cranial nerve 7 motor function) (limited exam due to video visit)          [x] No gaze palsy        [] Abnormal -          Skin:        [x] No significant exanthematous lesions or discoloration noted on facial skin         [] Abnormal -            Psychiatric:       [x] Normal Affect [] Abnormal -        [x] No Hallucinations    Other pertinent observable physical exam findings:-        We discussed the expected course, resolution and complications of the diagnosis(es) in detail. Medication risks, benefits, costs, interactions, and alternatives were discussed as indicated. I advised her to contact the office if her condition worsens, changes or fails to improve as anticipated. She expressed understanding with the diagnosis(es) and plan. Edgard Nickerson is a 54 y.o. female who was evaluated by a video visit encounter for concerns as above. Patient identification was verified prior to start of the visit. A caregiver was present when appropriate. Due to this being a TeleHealth encounter (During [facility]), evaluation of the following organ systems was limited: Vitals/Constitutional/EENT/Resp/CV/GI//MS/Neuro/Skin/Heme-Lymph-Imm. Pursuant to the emergency declaration under the [facility], [facility] waiver authority and the [employer] and [employer] Act, this Virtual  Visit was conducted, with patient's (and/or legal guardian's) consent, to reduce the patient's risk of exposure to COVID-19 and provide necessary medical care. Services were provided through a video synchronous discussion virtually to substitute for in-person clinic visit. Patient and provider were located at their individual homes.       Jim Valencia DO

## 2020-06-29 NOTE — PROGRESS NOTES
ADVISED PATIENT OF THE FOLLOWING HEALTH MAINTAINCE DUE  There are no preventive care reminders to display for this patient. Chief Complaint   Patient presents with    Fibromyalgia     states pain is in knees, back and shoulder 7/10     Pain (Chronic)    Post Traumatic Stress Disorder       1. Have you been to the ER, urgent care clinic since your last visit? Hospitalized since your last visit? No    2. Have you seen or consulted any other health care providers outside of the [address] [place] [address] since your last visit? Include any DEXA scan, mammography  or colon screening. No    3. Do you have an Advance Directive on file? no    4. Do you have a DNR on file? no    Patient is accompanied by self I have received verbal consent from [address] [place] [address] to discuss any/all medical information while they are present in the room. No flowsheet data found. [facility] [address] [address]  [address] [facility] [address]  Phone: [phone] Fax: [phone]    [address] [address]  Phone: [phone] Fax: [phone]        Patient reminded during visit to bring all medication bottles, OTC medications to all appointments.

## 2020-12-28 ENCOUNTER — VIRTUAL VISIT (OUTPATIENT)
Dept: INTERNAL MEDICINE CLINIC | Age: 45
End: 2020-12-28
Payer: MEDICAID

## 2020-12-28 DIAGNOSIS — M79.642 BILATERAL HAND PAIN: ICD-10-CM

## 2020-12-28 DIAGNOSIS — M54.2 NECK PAIN: ICD-10-CM

## 2020-12-28 DIAGNOSIS — M79.641 BILATERAL HAND PAIN: ICD-10-CM

## 2020-12-28 DIAGNOSIS — F43.10 PTSD (POST-TRAUMATIC STRESS DISORDER): ICD-10-CM

## 2020-12-28 DIAGNOSIS — M72.2 PLANTAR FASCIITIS OF RIGHT FOOT: ICD-10-CM

## 2020-12-28 DIAGNOSIS — M17.11 PRIMARY OSTEOARTHRITIS OF RIGHT KNEE: Primary | ICD-10-CM

## 2020-12-28 DIAGNOSIS — M79.7 FIBROMYALGIA SYNDROME: ICD-10-CM

## 2020-12-28 PROCEDURE — 99214 OFFICE O/P EST MOD 30 MIN: CPT | Performed by: INTERNAL MEDICINE

## 2020-12-28 RX ORDER — PANTOPRAZOLE SODIUM 40 MG/1
40 TABLET, DELAYED RELEASE ORAL DAILY
Qty: 90 TAB | Refills: 1 | Status: SHIPPED | OUTPATIENT
Start: 2020-12-28 | End: 2021-05-10 | Stop reason: SDUPTHER

## 2020-12-28 RX ORDER — AMITRIPTYLINE HYDROCHLORIDE 25 MG/1
25 TABLET, FILM COATED ORAL
Qty: 90 TAB | Refills: 1 | Status: SHIPPED | OUTPATIENT
Start: 2020-12-28 | End: 2021-05-10 | Stop reason: SDUPTHER

## 2020-12-28 RX ORDER — HYDROCORTISONE 1 %
CREAM (GRAM) TOPICAL
Qty: 30 G | Refills: 0 | Status: SHIPPED | OUTPATIENT
Start: 2020-12-28

## 2020-12-28 NOTE — PROGRESS NOTES
Pino Champion is a 54 y.o. female    No chief complaint on file. Health Maintenance Due   Topic Date Due    Flu Vaccine (1) 09/01/2020       There were no vitals taken for this visit. Pain level:9 neck, knee    Coordination of Care Questionnaire:  :   1) Have you been to an emergency room, urgent care, or hospitalized since your last visit? If yes, where when, and reason for visit? no       2. Have seen or consulted any other health care provider since your last visit? If yes, where when, and reason for visit?   NO

## 2020-12-31 NOTE — PROGRESS NOTES
Bright Landry is a 54 y.o. female who was seen by synchronous (real-time) audio-video technology on 2020 for Osteoarthritis and Follow Up Chronic Condition        Assessment & Plan:   Diagnoses and all orders for this visit:    1. Primary osteoarthritis of right knee    2. Plantar fasciitis of right foot    3. Neck pain    4. Fibromyalgia syndrome    5. Bilateral hand pain    6. PTSD (post-traumatic stress disorder)    Other orders  -     amitriptyline (ELAVIL) 25 mg tablet; Take 1 Tab by mouth nightly. -     pantoprazole (PROTONIX) 40 mg tablet; Take 1 Tab by mouth daily. -     hydrocortisone (CORTAID) 1 % topical cream; use thin layer to facial rash BID prn        I spent at least 25 minutes on this visit with this established patient. Subjective:     Pt. is seen virtually for f/u. Has a few chronic medical issues as documented. Continues to have chronic arthritic pains especially right knee. Has seen orthopedic MD with recommendation of TKR but she is hesitant to have it. Reports having right foot/heel pain with weightbearing now. Denies any trauma. Taking Voltaren with some help. Has chronic GERD issues. Also reports a rash on her face. It is itching some. She is a single mom. She is not working. She is originally from [place]. C[place] as a refugee.   in the war. Has PTSD with chronic depression and anxiety. Taking precautions for Covid 19. Denies any related signs or symptoms including fever, cough, SOB or CP. PMH/PSH/Allergies/Social History/medication list and most recent studies reviewed with patient. Tobacco use: No  Alcohol use: No    Reports compliance with medications and diet. Trying to be active physically to control weight. Reports no other new c/o.     Plan:  Continue Voltaren pill and gel per orthopedic MD  Explained to patient that medications will help her knee problems only some and most likely she needs surgery as recommended by orthopedic MD  Start Elavil nightly for insomnia/depression/anxiety  Restart Protonix for GERD  HC cream for facial rash  F/u with other MD's as scheduled  Remain active physically and watch diet  COVID-19 precautions discussed with pt  Follow-up 1 month or as needed  Prior to Admission medications    Medication Sig Start Date End Date Taking? Authorizing Provider   amitriptyline (ELAVIL) 25 mg tablet Take 1 Tab by mouth nightly. 12/28/20  Yes Tejas Pearl DO   pantoprazole (PROTONIX) 40 mg tablet Take 1 Tab by mouth daily. 12/28/20  Yes Patrica Vidal DO   hydrocortisone (CORTAID) 1 % topical cream use thin layer to facial rash BID prn 12/28/20  Yes Tejas Pearl DO   diclofenac (VOLTAREN) 1 % gel Apply  to affected area four (4) times daily. 11/15/19   Patrica Vidal DO     Patient Active Problem List    Diagnosis Date Noted    Plantar fasciitis of right foot 12/28/2020    Bilateral hand pain 12/28/2020    Primary osteoarthritis of right knee 06/29/2020    Arthritis of lumbar spine 05/14/2019    Pelvic pain     Ovarian cyst     Constipation     Fibromyalgia syndrome 06/29/2018    Plantar wart, left foot 06/29/2018    Arthralgia 05/01/2018    PTSD (post-traumatic stress disorder) 05/01/2018    Chronic midline low back pain with right-sided sciatica 04/10/2018    Chronic pain of both knees 04/10/2018    Recurrent major depressive disorder, in partial remission ([facility] [address] 04/10/2018    Gastroesophageal reflux disease without esophagitis 04/10/2018    Irregular periods/menstrual cycles 04/10/2018    DDD (degenerative disc disease), lumbar 03/08/2018     Current Outpatient Medications   Medication Sig Dispense Refill    amitriptyline (ELAVIL) 25 mg tablet Take 1 Tab by mouth nightly. 90 Tab 1    pantoprazole (PROTONIX) 40 mg tablet Take 1 Tab by mouth daily.  90 Tab 1    hydrocortisone (CORTAID) 1 % topical cream use thin layer to facial rash BID prn 30 g 0    diclofenac (VOLTAREN) 1 % gel Apply  to affected area four (4) times daily.  100 g 5     No Known Allergies  Past Medical History:   Diagnosis Date    Arthritis     Chronic pain     Constipation     Depression     Ovarian cyst     Pelvic pain      Past Surgical History:   Procedure Laterality Date    COLONOSCOPY N/A 4/9/2019    COLONOSCOPY performed by Héctor Nagel MD at [facility] ENDOSCOPY     Family History   Problem Relation Age of Onset    No Known Problems Mother     No Known Problems Father      Social History     Tobacco Use    Smoking status: Never Smoker    Smokeless tobacco: Never Used   Substance Use Topics    Alcohol use: No       ROS    Objective:     Patient-Reported Vitals 6/29/2020   Patient-Reported Height 5f   Patient-Reported LMP 06/18/2020        [INSTRUCTIONS:  \"[x]\" Indicates a positive item  \"[]\" Indicates a negative item  -- DELETE ALL ITEMS NOT EXAMINED]    Constitutional: [x] Appears well-developed and well-nourished [x] No apparent distress      [] Abnormal -     Mental status: [x] Alert and awake  [x] Oriented to person/place/time [x] Able to follow commands    [] Abnormal -     Eyes:   EOM    [x]  Normal    [] Abnormal -   Sclera  [x]  Normal    [] Abnormal -          Discharge [x]  None visible   [] Abnormal -     HENT: [x] Normocephalic, atraumatic  [] Abnormal -   [x] Mouth/Throat: Mucous membranes are moist    External Ears [x] Normal  [] Abnormal -    Neck: [x] No visualized mass [] Abnormal -     Pulmonary/Chest: [x] Respiratory effort normal   [x] No visualized signs of difficulty breathing or respiratory distress        [] Abnormal -      Musculoskeletal:   [x] Normal gait with no signs of ataxia         [x] Normal range of motion of neck        [] Abnormal -     Neurological:        [x] No Facial Asymmetry (Cranial nerve 7 motor function) (limited exam due to video visit)          [x] No gaze palsy        [] Abnormal -          Skin:        [x] No significant exanthematous lesions or discoloration noted on facial skin [] Abnormal -            Psychiatric:       [x] Normal Affect [] Abnormal -        [x] No Hallucinations    Other pertinent observable physical exam findings:-        We discussed the expected course, resolution and complications of the diagnosis(es) in detail. Medication risks, benefits, costs, interactions, and alternatives were discussed as indicated. I advised her to contact the office if her condition worsens, changes or fails to improve as anticipated. She expressed understanding with the diagnosis(es) and plan. Caty Pineda, who was evaluated through a patient-initiated, synchronous (real-time) audio-video encounter, and/or her healthcare decision maker, is aware that it is a billable service, with coverage as determined by her insurance carrier. She provided verbal consent to proceed: Yes, and patient identification was verified. It was conducted pursuant to the emergency declaration under the [address] [place] [address], [address] [place] [address] authority and the [employer] General Act. A caregiver was present when appropriate. Ability to conduct physical exam was limited. I was at home. The patient was at home.       Nina Napier, DO

## 2021-01-08 ENCOUNTER — TELEPHONE (OUTPATIENT)
Dept: INTERNAL MEDICINE CLINIC | Age: 46
End: 2021-01-08

## 2021-01-11 NOTE — TELEPHONE ENCOUNTER
Called to [facility] and they did not know who I was calling for and unable to assist. I advised of name I was given to call back and they do not have any who works there with that name.

## 2021-05-10 ENCOUNTER — VIRTUAL VISIT (OUTPATIENT)
Dept: INTERNAL MEDICINE CLINIC | Age: 46
End: 2021-05-10
Payer: MEDICAID

## 2021-05-10 DIAGNOSIS — G89.29 CHRONIC PAIN OF BOTH KNEES: Primary | ICD-10-CM

## 2021-05-10 DIAGNOSIS — M54.41 CHRONIC MIDLINE LOW BACK PAIN WITH RIGHT-SIDED SCIATICA: ICD-10-CM

## 2021-05-10 DIAGNOSIS — R30.0 DYSURIA: ICD-10-CM

## 2021-05-10 DIAGNOSIS — S61.211S LACERATION OF LEFT INDEX FINGER WITHOUT FOREIGN BODY WITHOUT DAMAGE TO NAIL, SEQUELA: ICD-10-CM

## 2021-05-10 DIAGNOSIS — G89.29 CHRONIC MIDLINE LOW BACK PAIN WITH RIGHT-SIDED SCIATICA: ICD-10-CM

## 2021-05-10 DIAGNOSIS — M25.562 CHRONIC PAIN OF BOTH KNEES: Primary | ICD-10-CM

## 2021-05-10 DIAGNOSIS — F43.10 PTSD (POST-TRAUMATIC STRESS DISORDER): ICD-10-CM

## 2021-05-10 DIAGNOSIS — M47.816 ARTHRITIS OF LUMBAR SPINE: ICD-10-CM

## 2021-05-10 DIAGNOSIS — M25.561 CHRONIC PAIN OF BOTH KNEES: Primary | ICD-10-CM

## 2021-05-10 DIAGNOSIS — R53.83 FATIGUE, UNSPECIFIED TYPE: ICD-10-CM

## 2021-05-10 PROCEDURE — 99214 OFFICE O/P EST MOD 30 MIN: CPT | Performed by: INTERNAL MEDICINE

## 2021-05-10 RX ORDER — CEPHALEXIN 500 MG/1
500 CAPSULE ORAL 2 TIMES DAILY
Qty: 14 CAP | Refills: 0 | Status: SHIPPED | OUTPATIENT
Start: 2021-05-10 | End: 2021-05-17

## 2021-05-10 RX ORDER — AMITRIPTYLINE HYDROCHLORIDE 25 MG/1
25 TABLET, FILM COATED ORAL
Qty: 90 TAB | Refills: 1 | Status: SHIPPED | OUTPATIENT
Start: 2021-05-10 | End: 2022-06-20 | Stop reason: ALTCHOICE

## 2021-05-10 RX ORDER — PANTOPRAZOLE SODIUM 40 MG/1
40 TABLET, DELAYED RELEASE ORAL DAILY
Qty: 90 TAB | Refills: 1 | Status: SHIPPED | OUTPATIENT
Start: 2021-05-10 | End: 2022-06-20 | Stop reason: SDUPTHER

## 2021-05-10 RX ORDER — DICLOFENAC SODIUM 75 MG/1
75 TABLET, DELAYED RELEASE ORAL 2 TIMES DAILY
Qty: 60 TAB | Refills: 5 | Status: SHIPPED | OUTPATIENT
Start: 2021-05-10 | End: 2021-06-09

## 2021-05-10 NOTE — PROGRESS NOTES
Results for orders placed or performed in visit on 12/16/19   LIPID PANEL   Result Value Ref Range    Cholesterol, total 162 100 - 199 mg/dL    Triglyceride 88 0 - 149 mg/dL    HDL Cholesterol 57 >39 mg/dL    VLDL, calculated 18 5 - 40 mg/dL    LDL, calculated 87 0 - 99 mg/dL   METABOLIC PANEL, COMPREHENSIVE   Result Value Ref Range    Glucose 94 65 - 99 mg/dL    BUN 11 6 - 24 mg/dL    Creatinine 0.61 0.57 - 1.00 mg/dL    GFR est non- >59 mL/min/1.73    GFR est  >59 mL/min/1.73    BUN/Creatinine ratio 18 9 - 23    Sodium 136 134 - 144 mmol/L    Potassium 4.5 3.5 - 5.2 mmol/L    Chloride 102 96 - 106 mmol/L    CO2 22 20 - 29 mmol/L    Calcium 9.1 8.7 - 10.2 mg/dL    Protein, total 7.1 6.0 - 8.5 g/dL    Albumin 4.3 3.5 - 5.5 g/dL    GLOBULIN, TOTAL 2.8 1.5 - 4.5 g/dL    A-G Ratio 1.5 1.2 - 2.2    Bilirubin, total 0.4 0.0 - 1.2 mg/dL    Alk. phosphatase 57 39 - 117 IU/L    AST (SGOT) 13 0 - 40 IU/L    ALT (SGPT) 11 0 - 32 IU/L   CBC W/O DIFF   Result Value Ref Range    WBC 5.9 3.4 - 10.8 x10E3/uL    RBC 4.58 3.77 - 5.28 x10E6/uL    HGB 12.9 11.1 - 15.9 g/dL    HCT 39.8 34.0 - 46.6 %    MCV 87 79 - 97 fL    MCH 28.2 26.6 - 33.0 pg    MCHC 32.4 31.5 - 35.7 g/dL    RDW 12.5 12.3 - 15.4 %    PLATELET 668 620 - 486 x10E3/uL   TSH 3RD GENERATION   Result Value Ref Range    TSH 1.920 0.450 - 4.500 uIU/mL   CVD REPORT   Result Value Ref Range    INTERPRETATION Note        Health Maintenance Due   Topic Date Due    COVID-19 Vaccine (1) Never done    PAP AKA CERVICAL CYTOLOGY  06/15/2021       No chief complaint on file. 1. Have you been to the ER, urgent care clinic since your last visit? Hospitalized since your last visit? YES, [facility], CHEST [facility],     2. Have you seen or consulted any other health care providers outside of the [address] [place] [address] since your last visit? Include any pap smears or colon screening.  No    3) Do you have an Advance Directive on file? no    4) Are you interested in receiving information on Advance Directives? NO      Patient is accompanied by self I have received verbal consent from [facility] to discuss any/all medical information while they are present in the room.

## 2021-05-10 NOTE — PROGRESS NOTES
Octavia Bishop (: 1975) is a 51 y.o. female, established patient, here for evaluation of the following chief complaint(s):   Arm Pain, Generalized Body Aches, and Follow Up Chronic Condition       ASSESSMENT/PLAN:  Below is the assessment and plan developed based on review of pertinent labs, studies, and medications. 1. Chronic pain of both knees  2. PTSD (post-traumatic stress disorder)  3. Arthritis of lumbar spine  4. Chronic midline low back pain with right-sided sciatica  5. Laceration of left index finger without foreign body without damage to nail, sequela  6. Fatigue, unspecified type  -     LIPID PANEL; Future  -     METABOLIC PANEL, COMPREHENSIVE; Future  -     CBC W/O DIFF; Future  -     TSH 3RD GENERATION; Future  7. Dysuria  -     URINALYSIS W/ REFLEX CULTURE; Future      Return in about 6 months (around 11/10/2021). SUBJECTIVE/OBJECTIVE:  Pt. is seen virtually for f/u. Has a few chronic medical issues as documented. She has chronic arthritic pains especially in knees. Has seen orthopedic MD with recommendation of TKR but she is hesitant to have it. Also reports having low back pain with radiation to right leg. Denies any recent fall or trauma. Has chronic GERD issues. Protonix helps. She is a single mom. She is not working. She is originally from [place]. [place] a [place].   in the war. Has PTSD with chronic depression and anxiety. Also reports having dysuria for a few days. No hematuria. Concerned about a UTI. Reports taking precautions for Covid 19. Denies any related signs or symptoms including fever, cough, SOB or CP. Needs repeat labs and medication refills. PMH/PSH/Allergies/Social History/medication list and most recent studies reviewed with patient. Tobacco use: No  Alcohol use: No    Reports compliance with medications and diet. Trying to be active physically but limited because of pain. Reports no other new c/o.     Plan:  Refill requested medications  Keflex for dysuria  Repeat labs including UA  F/u with other MD's as scheduled  Advised patient to follow-up with orthopedic MD and consider having knee surgery since that may be only solution to her chronic pain  COVID-19 precautions discussed with pt    Physical Exam    [INSTRUCTIONS:  \"[x]\" Indicates a positive item  \"[]\" Indicates a negative item  -- DELETE ALL ITEMS NOT EXAMINED]    Constitutional: [x] Appears well-developed and well-nourished [x] No apparent distress      [] Abnormal -     Mental status: [x] Alert and awake  [x] Oriented to person/place/time [x] Able to follow commands    [] Abnormal -     Eyes:   EOM    [x]  Normal    [] Abnormal -   Sclera  [x]  Normal    [] Abnormal -          Discharge [x]  None visible   [] Abnormal -     HENT: [x] Normocephalic, atraumatic  [] Abnormal -   [x] Mouth/Throat: Mucous membranes are moist    External Ears [x] Normal  [] Abnormal -    Neck: [x] No visualized mass [] Abnormal -     Pulmonary/Chest: [x] Respiratory effort normal   [x] No visualized signs of difficulty breathing or respiratory distress        [] Abnormal -      Musculoskeletal:   [x] Normal gait with no signs of ataxia         [x] Normal range of motion of neck        [] Abnormal -     Neurological:        [x] No Facial Asymmetry (Cranial nerve 7 motor function) (limited exam due to video visit)          [x] No gaze palsy        [] Abnormal -          Skin:        [x] No significant exanthematous lesions or discoloration noted on facial skin         [] Abnormal -            Psychiatric:       [x] Normal Affect [] Abnormal -        [x] No Hallucinations    Other pertinent observable physical exam findings:-        On this date 05/10/2021 I have spent 25 minutes reviewing previous notes, test results and face to face (virtual) with the patient discussing the diagnosis and importance of compliance with the treatment plan as well as documenting on the day of the visit. William Mack, was evaluated through a synchronous (real-time) audio-video encounter. The patient (or guardian if applicable) is aware that this is a billable service. Verbal consent to proceed has been obtained within the past 12 months. The visit was conducted pursuant to the emergency declaration under the [address] [place] [address] authority and the [employer] General Act. Patient identification was verified, and a caregiver was present when appropriate. The patient was located in a state where the provider was credentialed to provide care. An electronic signature was used to authenticate this note.   -- Chase Gautam, DO

## 2021-07-09 ENCOUNTER — OFFICE VISIT (OUTPATIENT)
Dept: INTERNAL MEDICINE CLINIC | Age: 46
End: 2021-07-09
Payer: MEDICAID

## 2021-07-09 VITALS
HEIGHT: 60 IN | BODY MASS INDEX: 30.04 KG/M2 | TEMPERATURE: 98.2 F | HEART RATE: 91 BPM | DIASTOLIC BLOOD PRESSURE: 74 MMHG | WEIGHT: 153 LBS | RESPIRATION RATE: 16 BRPM | SYSTOLIC BLOOD PRESSURE: 116 MMHG | OXYGEN SATURATION: 98 %

## 2021-07-09 DIAGNOSIS — G89.29 CHRONIC PAIN OF BOTH KNEES: Primary | ICD-10-CM

## 2021-07-09 DIAGNOSIS — F43.10 PTSD (POST-TRAUMATIC STRESS DISORDER): ICD-10-CM

## 2021-07-09 DIAGNOSIS — R10.9 RIGHT FLANK PAIN: ICD-10-CM

## 2021-07-09 DIAGNOSIS — M25.562 CHRONIC PAIN OF BOTH KNEES: Primary | ICD-10-CM

## 2021-07-09 DIAGNOSIS — M79.7 FIBROMYALGIA SYNDROME: ICD-10-CM

## 2021-07-09 DIAGNOSIS — M25.561 CHRONIC PAIN OF BOTH KNEES: Primary | ICD-10-CM

## 2021-07-09 DIAGNOSIS — M17.11 PRIMARY OSTEOARTHRITIS OF RIGHT KNEE: ICD-10-CM

## 2021-07-09 DIAGNOSIS — M47.816 ARTHRITIS OF LUMBAR SPINE: ICD-10-CM

## 2021-07-09 LAB
BILIRUB UR QL STRIP: ABNORMAL
GLUCOSE UR-MCNC: NEGATIVE MG/DL
KETONES P FAST UR STRIP-MCNC: NEGATIVE MG/DL
PH UR STRIP: 7.5 [PH] (ref 4.6–8)
PROT UR QL STRIP: ABNORMAL
SP GR UR STRIP: 1.02 (ref 1–1.03)
UA UROBILINOGEN AMB POC: ABNORMAL (ref 0.2–1)
URINALYSIS CLARITY POC: ABNORMAL
URINALYSIS COLOR POC: ABNORMAL
URINE BLOOD POC: NEGATIVE
URINE LEUKOCYTES POC: ABNORMAL
URINE NITRITES POC: NEGATIVE

## 2021-07-09 PROCEDURE — 81003 URINALYSIS AUTO W/O SCOPE: CPT | Performed by: INTERNAL MEDICINE

## 2021-07-09 PROCEDURE — 99214 OFFICE O/P EST MOD 30 MIN: CPT | Performed by: INTERNAL MEDICINE

## 2021-07-09 NOTE — PROGRESS NOTES
Health Maintenance Due   Topic Date Due    PAP AKA CERVICAL CYTOLOGY  06/15/2021       Chief Complaint   Patient presents with    Foot Pain     Right foot     Pain (Chronic)    Other     f/u on pt        1. Have you been to the ER, urgent care clinic since your last visit? Hospitalized since your last visit? [facility], [facility] foot [facility] and chest pain     2. Have you seen or consulted any other health care providers outside of the [address] [place] [address] since your last visit? Include any pap smears or colon screening. No    3) Do you have an Advance Directive on file? no    4) Are you interested in receiving information on Advance Directives? NO      Patient is accompanied by self I have received verbal consent from [facility] to discuss any/all medical information while they are present in the room.

## 2021-07-21 NOTE — PROGRESS NOTES
HISTORY OF PRESENT ILLNESS  Swathi Stone is a 51 y.o. female. Pt. comes in for f/u. Has a few chronic medical issues as documented. Vital signs are stable. She has chronic pain in both knees, lower back and shoulders. Followed by orthopedic MD who has recommended knee replacements but she is hesitant. NSAIDs help. Has chronic GERD issues. Protonix helps. Followed by psychologist/psychiatrist for depression and PTSD. Remains on amitriptyline. Reports having frequency and dysuria. No hematuria. Has had Covid vaccination. No related issues. She is now working. Needs a form for disability. PMH/PSH/Allergies/Social History/medication list and most recent studies reviewed with patient. Tobacco use: No  Alcohol use: No    Reports compliance with medications and diet. Trying to be active physically as tolerated. Reports no other new c/o. HPI    Review of Systems   Constitutional: Negative. HENT: Negative. Eyes: Negative. Respiratory: Negative for shortness of breath. Cardiovascular: Negative for chest pain and leg swelling. Gastrointestinal: Positive for heartburn. Negative for abdominal pain, blood in stool, constipation, diarrhea, melena, nausea and vomiting. Genitourinary: Negative for dysuria and frequency. Musculoskeletal: Positive for back pain, joint pain and myalgias. Negative for falls and neck pain. Skin: Negative. Neurological: Negative for dizziness, sensory change, focal weakness and headaches. Endo/Heme/Allergies: Negative. Psychiatric/Behavioral: Positive for depression. Negative for suicidal ideas. The patient is nervous/anxious and has insomnia. All other systems reviewed and are negative. Physical Exam  Vitals and nursing note reviewed. Constitutional:       General: She is not in acute distress. Appearance: She is well-developed. HENT:      Head: Normocephalic and atraumatic. Eyes:      General: No scleral icterus. Conjunctiva/sclera: Conjunctivae normal.   Neck:      Thyroid: No thyromegaly. Vascular: No JVD. Cardiovascular:      Rate and Rhythm: Normal rate and regular rhythm. Heart sounds: No murmur heard. Pulmonary:      Effort: Pulmonary effort is normal. No respiratory distress. Breath sounds: Normal breath sounds. No wheezing or rales. Abdominal:      General: Bowel sounds are normal. There is no distension. Palpations: Abdomen is soft. Tenderness: There is no abdominal tenderness. There is no right CVA tenderness or left CVA tenderness. Musculoskeletal:         General: Tenderness (Bilateral knees, lumbars, chronic,,, shoulders) present. No signs of injury. Cervical back: Normal range of motion and neck supple. Right lower leg: No edema. Left lower leg: No edema. Comments: DJD   Skin:     General: Skin is warm and dry. Findings: No rash. Neurological:      Mental Status: She is alert and oriented to person, place, and time. Coordination: Coordination normal.   Psychiatric:         Behavior: Behavior normal.      Comments: Chronically depressed/stressed         ASSESSMENT and PLAN  Diagnoses and all orders for this visit:    1. Chronic pain of both knees    2. Arthritis of lumbar spine    3. Primary osteoarthritis of right knee    4. PTSD (post-traumatic stress disorder)    5. Fibromyalgia syndrome    6. Right flank pain  -     AMB POC URINALYSIS DIP STICK AUTO W/O MICRO      Follow-up and Dispositions    · Return in about 4 months (around 11/9/2021).      lab results and schedule of future lab studies reviewed with patient  reviewed diet, exercise and weight control  reviewed medications and side effects in detail  F/u with other MD's as scheduled  COVID-19 precautions discussed with pt

## 2022-03-18 PROBLEM — G89.29 CHRONIC MIDLINE LOW BACK PAIN WITH RIGHT-SIDED SCIATICA: Status: ACTIVE | Noted: 2018-04-10

## 2022-03-18 PROBLEM — M54.41 CHRONIC MIDLINE LOW BACK PAIN WITH RIGHT-SIDED SCIATICA: Status: ACTIVE | Noted: 2018-04-10

## 2022-03-18 PROBLEM — M17.11 PRIMARY OSTEOARTHRITIS OF RIGHT KNEE: Status: ACTIVE | Noted: 2020-06-29

## 2022-03-18 PROBLEM — F43.10 PTSD (POST-TRAUMATIC STRESS DISORDER): Status: ACTIVE | Noted: 2018-05-01

## 2022-03-18 PROBLEM — M25.50 ARTHRALGIA: Status: ACTIVE | Noted: 2018-05-01

## 2022-03-19 PROBLEM — M51.369 DDD (DEGENERATIVE DISC DISEASE), LUMBAR: Status: ACTIVE | Noted: 2018-03-08

## 2022-03-19 PROBLEM — M25.561 CHRONIC PAIN OF BOTH KNEES: Status: ACTIVE | Noted: 2018-04-10

## 2022-03-19 PROBLEM — N92.6 IRREGULAR PERIODS/MENSTRUAL CYCLES: Status: ACTIVE | Noted: 2018-04-10

## 2022-03-19 PROBLEM — M47.816 ARTHRITIS OF LUMBAR SPINE: Status: ACTIVE | Noted: 2019-05-14

## 2022-03-19 PROBLEM — F33.41 RECURRENT MAJOR DEPRESSIVE DISORDER, IN PARTIAL REMISSION (HCC): Status: ACTIVE | Noted: 2018-04-10

## 2022-03-19 PROBLEM — M79.641 BILATERAL HAND PAIN: Status: ACTIVE | Noted: 2020-12-28

## 2022-03-19 PROBLEM — M25.562 CHRONIC PAIN OF BOTH KNEES: Status: ACTIVE | Noted: 2018-04-10

## 2022-03-19 PROBLEM — M79.642 BILATERAL HAND PAIN: Status: ACTIVE | Noted: 2020-12-28

## 2022-03-19 PROBLEM — M79.7 FIBROMYALGIA SYNDROME: Status: ACTIVE | Noted: 2018-06-29

## 2022-03-19 PROBLEM — G89.29 CHRONIC PAIN OF BOTH KNEES: Status: ACTIVE | Noted: 2018-04-10

## 2022-03-19 PROBLEM — M51.36 DDD (DEGENERATIVE DISC DISEASE), LUMBAR: Status: ACTIVE | Noted: 2018-03-08

## 2022-03-19 PROBLEM — M72.2 PLANTAR FASCIITIS OF RIGHT FOOT: Status: ACTIVE | Noted: 2020-12-28

## 2022-03-20 PROBLEM — K21.9 GASTROESOPHAGEAL REFLUX DISEASE WITHOUT ESOPHAGITIS: Status: ACTIVE | Noted: 2018-04-10

## 2022-03-20 PROBLEM — B07.0 PLANTAR WART, LEFT FOOT: Status: ACTIVE | Noted: 2018-06-29

## 2022-05-20 ENCOUNTER — OFFICE VISIT (OUTPATIENT)
Dept: FAMILY MEDICINE CLINIC | Age: 47
End: 2022-05-20
Payer: MEDICAID

## 2022-05-20 VITALS
BODY MASS INDEX: 29.06 KG/M2 | HEIGHT: 60 IN | OXYGEN SATURATION: 97 % | WEIGHT: 148 LBS | RESPIRATION RATE: 17 BRPM | TEMPERATURE: 98.7 F | HEART RATE: 95 BPM | DIASTOLIC BLOOD PRESSURE: 68 MMHG | SYSTOLIC BLOOD PRESSURE: 99 MMHG

## 2022-05-20 DIAGNOSIS — M25.511 PAIN OF BOTH SHOULDER JOINTS: ICD-10-CM

## 2022-05-20 DIAGNOSIS — M25.562 CHRONIC PAIN OF BOTH KNEES: ICD-10-CM

## 2022-05-20 DIAGNOSIS — M47.816 ARTHRITIS OF LUMBAR SPINE: ICD-10-CM

## 2022-05-20 DIAGNOSIS — M25.512 PAIN OF BOTH SHOULDER JOINTS: ICD-10-CM

## 2022-05-20 DIAGNOSIS — M54.41 CHRONIC MIDLINE LOW BACK PAIN WITH RIGHT-SIDED SCIATICA: ICD-10-CM

## 2022-05-20 DIAGNOSIS — F43.10 PTSD (POST-TRAUMATIC STRESS DISORDER): Primary | ICD-10-CM

## 2022-05-20 DIAGNOSIS — G89.29 CHRONIC MIDLINE LOW BACK PAIN WITH RIGHT-SIDED SCIATICA: ICD-10-CM

## 2022-05-20 DIAGNOSIS — M51.36 DDD (DEGENERATIVE DISC DISEASE), LUMBAR: ICD-10-CM

## 2022-05-20 DIAGNOSIS — M79.642 BILATERAL HAND PAIN: ICD-10-CM

## 2022-05-20 DIAGNOSIS — E55.9 VITAMIN D DEFICIENCY DISEASE: ICD-10-CM

## 2022-05-20 DIAGNOSIS — M25.561 CHRONIC PAIN OF BOTH KNEES: ICD-10-CM

## 2022-05-20 DIAGNOSIS — N30.00 ACUTE CYSTITIS WITHOUT HEMATURIA: ICD-10-CM

## 2022-05-20 DIAGNOSIS — G89.29 CHRONIC PAIN OF BOTH KNEES: ICD-10-CM

## 2022-05-20 DIAGNOSIS — M79.641 BILATERAL HAND PAIN: ICD-10-CM

## 2022-05-20 PROCEDURE — 99204 OFFICE O/P NEW MOD 45 MIN: CPT | Performed by: FAMILY MEDICINE

## 2022-05-20 RX ORDER — GABAPENTIN 300 MG/1
300 CAPSULE ORAL
Qty: 30 CAPSULE | Refills: 0 | Status: SHIPPED | OUTPATIENT
Start: 2022-05-20 | End: 2022-07-25 | Stop reason: SDUPTHER

## 2022-05-20 RX ORDER — VENLAFAXINE 37.5 MG/1
37.5 TABLET ORAL
Qty: 60 TABLET | Refills: 0 | Status: SHIPPED | OUTPATIENT
Start: 2022-05-20 | End: 2022-07-25 | Stop reason: SDUPTHER

## 2022-05-20 NOTE — PROGRESS NOTES
Chief Complaint   Patient presents with   Vallie Roller Establish Care     legs, back, shoulder and knees hurt        1. \"Have you been to the ER, urgent care clinic since your last visit? Hospitalized since your last visit? \" Yes When: 2021 [facility]     2. \"Have you seen or consulted any other health care providers outside of the [address] [place] [address] since your last visit? \" Yes [facility]      3. For patients aged 39-70: Has the patient had a colonoscopy / FIT/ Cologuard? no      If the patient is female:    4. For patients aged 41-77: Has the patient had a mammogram within the past 2 years? Yes - no Care Gap present      5. For patients aged 21-65: Has the patient had a pap smear?  Yes - no Care Gap present    Health Maintenance Due   Topic Date Due    Depression Monitoring  Never done    COVID-19 Vaccine (3 - Booster for Pfizer series) 10/14/2021

## 2022-05-20 NOTE — LETTER
Obey   :1975   MR #:[id number]   [address] [place] [address] OFFICE-ANNEX   Page 1 of 5        CONTROLLED SUBSTANCE AGREEMENT     I may be prescribed medications that are controlled substances as part  of my treatment plan for management of my medical condition(s). The goal of my treatment plan is to maintain and/or improve my health and wellbeing. Because controlled substances have an increased risk of abuse or harm, continual re-evaluation is needed determine if the goals of my treatment plan are being met for my safety and the safety of others. Dede Jm  am entering into this Controlled Substance Agreement with my provider, __________________________________ at [address] [place] [address] OFFICE-ANNEX . I understand that successful treatment requires mutual trust and honesty between me and my provider. I understand that there are state and federal laws and regulations which apply to the medications that my provider may prescribe that must be followed. I understand there are risks and benefits ts of taking the medicines that my provider may prescribe. I understand and agree that following this Agreement is necessary in continuing my provider-patient relationship and success of my treatment plan. As a part of my treatment plan, I agree to the following:    COMMUNICATION:    1. I will communicate fully with my provider about my medical condition(s), including the effect on my daily life and how well my medications are helping. I will tell my provider all of the medications that I take for any reason, including medications I receive from another health care provider, and will notify my provider about all issues, problems or concerns, including any side effects, which may be related to my medications. I understand that this information allows my provider to adjust my treatment plan to help manage my medical condition.  I understand that this information will become part of my permanent medical record. 2. I will notify my provider if I have a history of alcohol/drug misuse/addiction or if I have had treatment for alcohol/drug addiction in the past, or if I have a new problem with or concern about alcohol/drug use/addiction, because this increases the likelihood of high risk behaviors and may lead to serious medical conditions. 3. Females Only: I will notify my provider if I am or become pregnant, or if I intend to become pregnant, or if I intend to breastfeed. I understand that communication of these issues with my provider is important, due to possible effects my medication could have on an unborn fetus or breastfeeding child. Initials_____      Name:. Lyn Perez   :1975   MR #:[id number]   [address] [place] [address] OFFICE-ANNEX   Page 2 of 5       MISUSE OF MEDICATIONS / DRUGS:    1. I agree to take all controlled substances as prescribed, and will not misuse or abuse any controlled substances prescribed by my provider. For my safety, I will not increase the amount of medicine I take without first talking with and getting permission from my provider. 2. If I have a medical emergency, another health care provider may prescribe me medication. If I seek emergency treatment, I will notify my provider within seventy-two (72) hours. 3. I understand that my provider may discuss my use and/or possible misuse/abuse of controlled substances and alcohol, as appropriate, with any health care provider involved in my care, pharmacist or legal authority. ILLEGAL DRUGS:    1. I will not use illegal drugs of any kind, including but not limited to marijuana, heroin, cocaine, or any prescription drug which is not prescribed to me. DRUG DIVERSION / PRESCRIPTION FRAUD:    1. I will not share, sell, trade, give away, or otherwise misuse my prescriptions or medications. 2. I will not alter any prescriptions provided to me by my provider.     SINGLE PROVIDER:    1. I agree that all controlled substances that I take will be prescribed only by my provider (or his/her covering provider) under this Agreement. This agreement does not prevent me from seeking emergency medical treatment or receiving pain management related to a surgery. PROTECTING MEDICATIONS:    1. I am responsible for keeping my prescriptions and medications in a safe and secure place including safeguarding them from loss or theft. I understand that lost, stolen or damaged/destroyed prescriptions or medications will not be replaced. Initials____          Name:Jason Vanessa   :1975   MR #:[id number]   [address] [place] [address] OFFICE-ANNEX   Page 3 of 5   PRESCRIPTION RENEWALS/REFILLS:    1. I will follow my controlled substance medication schedule as prescribed by my provider. 2. I understand and agree that I will make any requests for renewals or refills of my prescriptions only at the time of an office visit or during my providers regular office hours subject to the prescription refill requirements of the individual practice. 3. I understand that my provider may not call in prescriptions for controlled substances to my pharmacy. 4. I understand that my provider may adjust or discontinue these medications as deemed appropriate for my medical treatment plan. This Agreement does not guarantee the prescription of controlled medications. 5. I agree that if my medications are adjusted or discontinued, I will properly dispose of any remaining medications. I understand that I will be required to dispose of any remaining controlled medications prior to being provided with any prescriptions for other controlled medications. 6. I understand that the renewal of my prescription depends on my medical condition, my consistent participation, and my adherence with my treatment plan and this Agreement.     7. I understand that if I do not keep an appointment with my provider, I may not receive a renewal or refill for my controlled substance medication. PRESCRIPTION MONITORING / DRUG TESTIN. I understand that my provider may require me to provide urine, saliva or blood for testing at any time. I understand that this testing will be used to monitor for safety and adherence with my treatment plan and this Agreement. 2. I understand that my provider may ask me to provide an observed urine specimen, which means that a nurse or other health care provider may watch me provide urine, and I agree to cooperate if I am asked to provide an observed specimen. 3. I understand that if I do not provide urine, saliva or blood samples within two (2) hours of my providers request, or other timeframe decided by my provider, my treatment plan could be changed, or my prescriptions and medications may be changed or ended. 4. I understand that urine, saliva and blood test results will be a part of my permanent medical record. Initials_____        Name:Jason Olivera   :1975   MR #:[id number]   [address] [place] [address] MAIN OFFICE-ANNEX   Page 4 of 5    5. I understand that my provider is required to obtain a copy of my State Prescription Monitoring Program () Report at any time in order to safely prescribe medications. 6. I will bring all of my prescribed controlled substance medications in their original bottles to all of my scheduled appointments. 7. I understand that my provider may ask me to come to the practice with all of my prescribed medications for a random pill count at any time. I agree to cooperate if I am asked to come in for a random pill count. I understand that if I do not arrive in the timeframe decided by my provider, my treatment plan could be changed, or my prescriptions and medications may be changed or ended.     COOPERATION WITH INVESTIGATIONS:    1. I authorize my provider and my pharmacy to cooperate fully with any local, state, or federal law enforcement agency in the investigation of any possible misuse, sale, or other diversion of my controlled substance prescriptions or medications. RISKS:    1. I understand that my level of consciousness may be affected from the use of controlled substances, and I understand that there are risks, benefits, effects and potential alternatives (including no treatment) to the medications that my provider has prescribed. 2. I understand that I may become drowsy, tired, dizzy, constipated, and sick to my stomach, or have changes in my mood or in my sleep while taking my medications. I have talked with my provider about these possible side effects, risks, benefits, and alternative treatments, and my provider has answered all of my questions. 3. I understand that I should not suddenly stop taking my medications without first speaking with my provider. I understand that if I suddenly stop taking my medications, I may experience nausea, vomiting, sweating,anxiety, sleeplessness, itching or other uncomfortable feelings. 4. I will not take my medications with alcohol of any kind, including beer, wine or liquor. I understand that drinking alcohol with my medications increases the chances of side effects, including breathing problems or even death. 5. I understand that if I have a history of alcoholism or other drug addiction I may be at increased risk of addiction to my medications. Signs of addiction might include craving, compulsive use, and continued use despite harm. Since addiction is a disease, I understand my provider may decide to change my medications and refer me to appropriate treatment services. I understand that this information would become part of my permanent medical record. Initials_____        Name:Jason Webster   :1975   MR #:[id number]   [address] [place] [address] OFFICE-ANNEX   Page 5 of 5       6.  Females only: Children born to women who regularly take controlled substances are likely to have physical problems and suffer withdrawal symptoms at birth. If I am of child-bearing age, I understand that I should use safe and effective birth control while taking any controlled substances to avoid the impact of medications on an unborn fetus or  child. I agree to notify my provider immediately if I should become pregnant so that my treatment plan can be adjusted. 7. Males only: I understand that chronic use of controlled substances has been associated with low testosterone levels in males which may affect my mood, stamina, sexual desire, and general health. I understand that my provider may order the appropriate laboratory test to determine my testosterone level,and I agree to this testing. ADHERENCE:    1. I understand that if I do not adhere to this Agreement in any way, my provider may change my prescriptions, stop prescribing controlled substances or end our provider-patient relationship. 2. If my provider decides to stop prescribing medication, or decides to end our provider-patient relationship,my provider may require that I taper my medications slowly. If necessary, my provider may also provide a prescription for other medications to treat my withdrawal symptoms. UNDERSTANDING THIS AGREEMENT:    I understand that my provider may adjust or stop my prescriptions for controlled substances based on my medical condition and my treatment plan. I understand that this Agreement does not guarantee that I will be prescribed medications or controlled substances. I understand that controlled substances may be just one part  of my treatment plan. My initial on each page and my signature below shows that I have read each page of this Agreement, I have had an opportunity to ask questions, and all of my questions have been answered to my satisfaction by my provider.     By signing below, I agree to comply with this Agreement, and I understand that if I do not follow the Agreements listed above, my provider may stop    _________________________________________  Date/Time 5/20/2022 10:58 AM                 (Patient Signature)    ________________________________________    Date/Time 5/20/2022 10:58 AM   (Parent or Guardian Signature if <18 yrs)    _________________________________________ Date/Time 5/20/2022 10:58 AM   (Provider Signature)
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
## 2022-05-23 LAB
25(OH)D3+25(OH)D2 SERPL-MCNC: 9.5 NG/ML (ref 30–100)
ALBUMIN SERPL-MCNC: 4.3 G/DL (ref 3.8–4.8)
ALBUMIN/GLOB SERPL: 1.5 {RATIO} (ref 1.2–2.2)
ALP SERPL-CCNC: 63 IU/L (ref 44–121)
ALT SERPL-CCNC: 13 IU/L (ref 0–32)
APPEARANCE UR: ABNORMAL
AST SERPL-CCNC: 16 IU/L (ref 0–40)
BACTERIA #/AREA URNS HPF: ABNORMAL /[HPF]
BASOPHILS # BLD AUTO: 0.1 X10E3/UL (ref 0–0.2)
BASOPHILS NFR BLD AUTO: 1 %
BILIRUB SERPL-MCNC: 0.2 MG/DL (ref 0–1.2)
BILIRUB UR QL STRIP: NEGATIVE
BUN SERPL-MCNC: 7 MG/DL (ref 6–24)
BUN/CREAT SERPL: 11 (ref 9–23)
CALCIUM SERPL-MCNC: 9.6 MG/DL (ref 8.7–10.2)
CASTS URNS QL MICRO: ABNORMAL /LPF
CHLORIDE SERPL-SCNC: 102 MMOL/L (ref 96–106)
CHOLEST SERPL-MCNC: 157 MG/DL (ref 100–199)
CO2 SERPL-SCNC: 21 MMOL/L (ref 20–29)
COLOR UR: YELLOW
CREAT SERPL-MCNC: 0.63 MG/DL (ref 0.57–1)
CRYSTALS URNS MICRO: ABNORMAL
EGFR: 109 ML/MIN/1.73
EOSINOPHIL # BLD AUTO: 0.2 X10E3/UL (ref 0–0.4)
EOSINOPHIL NFR BLD AUTO: 3 %
EPI CELLS #/AREA URNS HPF: ABNORMAL /HPF (ref 0–10)
ERYTHROCYTE [DISTWIDTH] IN BLOOD BY AUTOMATED COUNT: 12.8 % (ref 11.7–15.4)
EST. AVERAGE GLUCOSE BLD GHB EST-MCNC: 103 MG/DL
FERRITIN SERPL-MCNC: 43 NG/ML (ref 15–150)
GLOBULIN SER CALC-MCNC: 2.8 G/DL (ref 1.5–4.5)
GLUCOSE SERPL-MCNC: 118 MG/DL (ref 65–99)
GLUCOSE UR QL STRIP: NEGATIVE
HBA1C MFR BLD: 5.2 % (ref 4.8–5.6)
HCT VFR BLD AUTO: 43.9 % (ref 34–46.6)
HDLC SERPL-MCNC: 48 MG/DL
HGB BLD-MCNC: 13.7 G/DL (ref 11.1–15.9)
HGB UR QL STRIP: NEGATIVE
IMM GRANULOCYTES # BLD AUTO: 0 X10E3/UL (ref 0–0.1)
IMM GRANULOCYTES NFR BLD AUTO: 0 %
KETONES UR QL STRIP: NEGATIVE
LDLC SERPL CALC-MCNC: 81 MG/DL (ref 0–99)
LEUKOCYTE ESTERASE UR QL STRIP: NEGATIVE
LYMPHOCYTES # BLD AUTO: 2.9 X10E3/UL (ref 0.7–3.1)
LYMPHOCYTES NFR BLD AUTO: 40 %
MCH RBC QN AUTO: 28.4 PG (ref 26.6–33)
MCHC RBC AUTO-ENTMCNC: 31.2 G/DL (ref 31.5–35.7)
MCV RBC AUTO: 91 FL (ref 79–97)
MICRO URNS: ABNORMAL
MONOCYTES # BLD AUTO: 0.5 X10E3/UL (ref 0.1–0.9)
MONOCYTES NFR BLD AUTO: 8 %
NEUTROPHILS # BLD AUTO: 3.6 X10E3/UL (ref 1.4–7)
NEUTROPHILS NFR BLD AUTO: 48 %
NITRITE UR QL STRIP: POSITIVE
PH UR STRIP: 7.5 [PH] (ref 5–7.5)
PLATELET # BLD AUTO: 301 X10E3/UL (ref 150–450)
POTASSIUM SERPL-SCNC: 4.2 MMOL/L (ref 3.5–5.2)
PROT SERPL-MCNC: 7.1 G/DL (ref 6–8.5)
PROT UR QL STRIP: ABNORMAL
RBC # BLD AUTO: 4.83 X10E6/UL (ref 3.77–5.28)
RBC #/AREA URNS HPF: ABNORMAL /HPF (ref 0–2)
SODIUM SERPL-SCNC: 139 MMOL/L (ref 134–144)
SP GR UR STRIP: 1.02 (ref 1–1.03)
T4 FREE SERPL-MCNC: 1.41 NG/DL (ref 0.82–1.77)
TRIGL SERPL-MCNC: 166 MG/DL (ref 0–149)
TSH SERPL DL<=0.005 MIU/L-ACNC: 2.61 UIU/ML (ref 0.45–4.5)
UNIDENT CRYS URNS QL MICRO: PRESENT
UROBILINOGEN UR STRIP-MCNC: 0.2 MG/DL (ref 0.2–1)
VLDLC SERPL CALC-MCNC: 28 MG/DL (ref 5–40)
WBC # BLD AUTO: 7.2 X10E3/UL (ref 3.4–10.8)
WBC #/AREA URNS HPF: ABNORMAL /HPF (ref 0–5)

## 2022-05-30 RX ORDER — NITROFURANTOIN 25; 75 MG/1; MG/1
100 CAPSULE ORAL 2 TIMES DAILY
Qty: 14 CAPSULE | Refills: 0 | Status: SHIPPED | OUTPATIENT
Start: 2022-05-30 | End: 2022-06-06

## 2022-05-30 RX ORDER — ERGOCALCIFEROL 1.25 MG/1
50000 CAPSULE ORAL
Qty: 4 CAPSULE | Refills: 11 | Status: SHIPPED | OUTPATIENT
Start: 2022-05-30

## 2022-05-30 NOTE — PATIENT INSTRUCTIONS
Neuropathic Pain: Care Instructions  Overview     Neuropathic pain is caused by pressure on or damage to your nerves. It's often simply called nerve pain. Some people feel this type of pain all the time. For others, it comes and goes. Diabetes, shingles, or an injury can cause nerve pain. Many people say the pain feels sharp, burning, or stabbing. But some people feel it as a dull ache. In some cases, it makes your skin very sensitive. So touch, pressure, and other sensations that did not hurt before may now cause pain. It's important to know that this kind of pain is real and can affect your quality of life. It's also important to know that treatment can help. Treatment includes pain medicines, exercise, and physical therapy. Medicines can help reduce the number of pain signals that travel over the nerves. This can make the painful areas less sensitive. It can also help you sleep better and improve your mood. But medicines are only one part of successful treatment. Most people do best with more than one kind of treatment. Your doctor may recommend that you try cognitive-behavioral therapy and stress management. If you feel that your treatment is not working, talk to your doctor. And be sure to tell your doctor if you think you might be depressed or anxious. These are common problems that can also be treated. Follow-up care is a key part of your treatment and safety. Be sure to make and go to all appointments, and call your doctor if you are having problems. It's also a good idea to know your test results and keep a list of the medicines you take. How can you care for yourself at home? · Be safe with medicines. Read and follow all instructions on the label. ? If the doctor gave you a prescription medicine for pain, take it as prescribed. ? If you are not taking a prescription pain medicine, ask your doctor if you can take an over-the-counter medicine.   · Save hard tasks for days when you have less pain. Follow a hard task with an easy task. And remember to take breaks. · Relax, and reduce stress. You may want to try deep breathing or meditation. These can help. · Keep moving. Gentle, daily exercise can help reduce pain. Your doctor or physical therapist can tell you what type of exercise is best for you. This may include walking, swimming, and stationary biking. It may also include stretches and range-of-motion exercises. · Try heat, cold packs, and massage. · Get enough sleep. Constant pain can make you more tired. If the pain makes it hard to sleep, talk with your doctor. · Think positively. Your thoughts can affect your pain. Do fun things to distract yourself from the pain. See a movie, read a book, listen to music, or spend time with a friend. · Keep a pain diary. Try to write down how strong your pain is and what it feels like. Also try to notice and write down how your moods, thoughts, sleep, activities, and medicine affect your pain. These notes can help you and your doctor find the best ways to treat your pain. Reducing constipation caused by pain medicine  Pain medicines often cause constipation. To reduce constipation:  · Talk to your doctor about a laxative. If a laxative doesn't work, your doctor may suggest a prescription medicine. · Include fruits, vegetables, beans, and whole grains in your diet each day. These foods are high in fiber. · Get some exercise every day. Build up slowly to 30 to 60 minutes a day on 5 or more days of the week. · Take a fiber supplement, such as Citrucel or Metamucil, every day if needed. Read and follow all instructions on the label. · Schedule time each day for a bowel movement. Having a daily routine may help. Take your time and do not strain when having a bowel movement. When should you call for help? Call your doctor now or seek immediate medical care if:    · You feel sad, anxious, or hopeless for more than a few days.  This could mean you are depressed. Depression is common in people who have a lot of pain. But it can be treated.     · You have trouble with bowel movements, such as:  ? No bowel movement in 3 days. ? Blood in the anal area, in your stool, or on the toilet paper. ? Diarrhea for more than 24 hours. Watch closely for changes in your health, and be sure to contact your doctor if:    · Your pain is getting worse.     · You can't sleep because of pain.     · You are very worried or anxious about your pain.     · You have trouble taking your pain medicine.     · You have any concerns about your pain medicine or its side effects.     · You have vomiting or cramps for more than 2 hours. Where can you learn more? Go to http://www.ARTtwo50/  Enter A602 in the search box to learn more about \"Neuropathic Pain: Care Instructions. \"  Current as of: December 13, 2021               Content Version: 13.2  © 2006-2022 [employer]. Care instructions adapted under license by [employer] (which disclaims liability or warranty for this information). If you have questions about a medical condition or this instruction, always ask your healthcare professional. [place] [address] any warranty or liability for your use of this information. Post-Traumatic Stress Disorder (PTSD): Care Instructions  Overview     Post-traumatic stress disorder (PTSD) is a mental health problem that can result from being in or seeing a traumatic or terrifying event. These events can include combat, a terrorist attack, a natural disaster, a serious accident, an assault, or a rape. If you have PTSD, you may often relive the experience in nightmares or flashbacks. These are clear and frightening memories of the event. You may also have trouble sleeping. PTSD affects people in very different ways.  It can interfere with daily activities such as work or school, and it can make you withdraw from friends or loved ones.  Follow-up care is a key part of your treatment and safety. Be sure to make and go to all appointments, and call your doctor if you are having problems. It's also a good idea to know your test results and keep a list of the medicines you take. How can you care for yourself at home? · Take your medicines exactly as they are prescribed. If you are taking an antidepressant, you may start to feel better within 1 to 3 weeks. But it can take as many as 6 to 8 weeks to see more improvement. If you have questions or concerns about your medicines, or if you don't notice any improvement after 3 weeks, talk to your doctor. · Go to your counseling sessions and follow-up appointments. · Recognize and accept your anxiety. Then, when you are in a situation that makes you anxious, say to yourself, \"This is not an emergency. I feel uncomfortable, but I am not in danger. I can keep going even if I feel anxious. \"  · Be kind to your body:  ? Get enough rest.  ? Get at least 30 minutes of exercise on most days of the week. Walking is a good choice. You also may want to do other activities, such as running, swimming, cycling, or playing tennis or team sports. ? Avoid alcohol, caffeine, nicotine, marijuana, and illegal drugs. They can make your symptoms worse. ? Learn and do relaxation techniques. See below for more about these techniques. · Keep a record of your symptoms. Discuss your fears with a good friend or family member, or join a support group for people with similar problems. Talking to others sometimes relieves stress. · Connect with others. Get involved in social groups, or volunteer to help others. Or get out and do something you enjoy. Watch a movie, or take a walk or hike with a friend. · Keep the numbers for these national suicide hotlines: 5-968-556-TALK (2-988.320.9028) and 1-042-FVPNLBX (0-620.369.4505). If you or someone you know talks about suicide or feeling hopeless, get help right away.   Relaxation techniques  Do relaxation exercises 10 to 20 minutes a day. You can play soothing, relaxing music while you do them, if you wish. · Tell others in your house that you are going to do your relaxation exercises. Ask them not to disturb you. · Find a comfortable place, away from all distractions and noise. · Lie down on your back, or sit with your back straight. · Focus on your breathing. Make it slow and steady. · Breathe in through your nose. Breathe out through either your nose or mouth. · Breathe deeply, filling up the area between your navel and your rib cage. Breathe so that your belly goes up and down. · Do not hold your breath. · Breathe like this for 5 to 10 minutes. Notice the feeling of calmness throughout your whole body. As you continue to breathe slowly and deeply, relax by doing the following for another 5 to 10 minutes:  · Tighten and relax each muscle group in your body. You can begin at your toes and work your way up to your head. · Imagine your muscle groups relaxing and becoming heavy. · Empty your mind of all thoughts. · Let yourself relax more and more deeply. · Become aware of the state of calmness that surrounds you. · When your relaxation time is over, you can bring yourself back to alertness by moving your fingers and toes and then your hands and feet and then stretching and moving your entire body. Sometimes people fall asleep during relaxation, but they usually wake up shortly afterward. · Always give yourself time to return to full alertness before you drive a car or do anything that might cause an accident if you are not fully alert. Never play a relaxation tape while you drive a car. When should you call for help? Call 911 anytime you think you may need emergency care. For example, call if:    · You feel you cannot stop from hurting yourself or someone else.    Call the [address] [place] [address] at [phone] if you or someone you know is:    · Feeling hopeless or thinking of hurting or killing themselves. Watch closely for changes in your health, and be sure to contact your doctor if:    · Your PTSD symptoms are getting worse.     · You have new or worse symptoms of anxiety or depression.     · You are not getting better as expected. Where can you learn more? Go to http://www.lerner.com/  Enter X299 in the search box to learn more about \"Post-Traumatic Stress Disorder (PTSD): Care Instructions. \"  Current as of: June 16, 2021               Content Version: 13.2  © 7957-9590 [employer]. Care instructions adapted under license by [employer] (which disclaims liability or warranty for this information). If you have questions about a medical condition or this instruction, always ask your healthcare professional. [address] any warranty or liability for your use of this information.

## 2022-06-20 ENCOUNTER — OFFICE VISIT (OUTPATIENT)
Dept: FAMILY MEDICINE CLINIC | Age: 47
End: 2022-06-20
Payer: MEDICAID

## 2022-06-20 VITALS
BODY MASS INDEX: 28.9 KG/M2 | RESPIRATION RATE: 16 BRPM | DIASTOLIC BLOOD PRESSURE: 70 MMHG | TEMPERATURE: 97 F | HEART RATE: 91 BPM | WEIGHT: 147.2 LBS | OXYGEN SATURATION: 97 % | HEIGHT: 60 IN | SYSTOLIC BLOOD PRESSURE: 102 MMHG

## 2022-06-20 DIAGNOSIS — M79.642 PAIN IN BOTH HANDS: ICD-10-CM

## 2022-06-20 DIAGNOSIS — K21.9 GASTROESOPHAGEAL REFLUX DISEASE WITHOUT ESOPHAGITIS: ICD-10-CM

## 2022-06-20 DIAGNOSIS — D50.8 OTHER IRON DEFICIENCY ANEMIA: ICD-10-CM

## 2022-06-20 DIAGNOSIS — M54.2 NECK PAIN: Primary | ICD-10-CM

## 2022-06-20 DIAGNOSIS — E55.9 VITAMIN D DEFICIENCY: ICD-10-CM

## 2022-06-20 DIAGNOSIS — N30.00 ACUTE CYSTITIS WITHOUT HEMATURIA: ICD-10-CM

## 2022-06-20 DIAGNOSIS — M79.641 PAIN IN BOTH HANDS: ICD-10-CM

## 2022-06-20 PROCEDURE — 99214 OFFICE O/P EST MOD 30 MIN: CPT | Performed by: FAMILY MEDICINE

## 2022-06-20 RX ORDER — PANTOPRAZOLE SODIUM 40 MG/1
40 TABLET, DELAYED RELEASE ORAL DAILY
Qty: 90 TABLET | Refills: 1 | Status: SHIPPED | OUTPATIENT
Start: 2022-06-20

## 2022-06-20 RX ORDER — NITROFURANTOIN (MACROCRYSTALS) 100 MG/1
100 CAPSULE ORAL 2 TIMES DAILY
Qty: 14 CAPSULE | Refills: 0 | Status: SHIPPED | OUTPATIENT
Start: 2022-06-20 | End: 2022-06-27

## 2022-06-20 RX ORDER — FERROUS SULFATE 325(65) MG
325 TABLET, DELAYED RELEASE (ENTERIC COATED) ORAL
Qty: 60 TABLET | Refills: 3 | Status: SHIPPED | OUTPATIENT
Start: 2022-06-20

## 2022-06-20 RX ORDER — ASPIRIN 325 MG
50000 TABLET, DELAYED RELEASE (ENTERIC COATED) ORAL
Qty: 4 CAPSULE | Refills: 4 | Status: SHIPPED | OUTPATIENT
Start: 2022-06-20 | End: 2022-07-25 | Stop reason: SDUPTHER

## 2022-06-20 NOTE — PROGRESS NOTES
Chief Complaint   Patient presents with    Neck Pain    Flank Pain     right      1. Have you been to the ER, urgent care clinic since your last visit? Hospitalized since your last visit? No    2. Have you seen or consulted any other health care providers outside of the [address] [place] [address] since your last visit? Include any pap smears or colon screening.  No     Health Maintenance   Topic Date Due    COVID-19 Vaccine (3 - Booster for Pfizer series) 10/14/2021    Flu Vaccine (Season Ended) 09/01/2022    Depression Monitoring  05/20/2023    Cervical cancer screen  06/15/2023    Lipid Screen  05/20/2027    DTaP/Tdap/Td series (2 - Td or Tdap) 01/11/2028    Colorectal Cancer Screening Combo  04/09/2029    Hepatitis C Screening  Completed    Pneumococcal 0-64 years  Aged Out     verified patient with two type of identifiers, son present  C/o constanit neck, knee and right flank pain x years,  \"bone\" hurts

## 2022-06-28 NOTE — PROGRESS NOTES
HISTORY OF PRESENT ILLNESS  Corrine Benson is a 49 y.o. female, patient present with multiple complaints urinary frequency feeling tired fatigue not getting better current medication not helping,      The problem has changed and not worsening since onset. is a sharp pain and  is at a severity of 5/10. the pt has the AM stiffness, but denies numbness and tingling sensations  There has been no history of extremity trauma,  no incontinence of urine nor of stool, and no upper ext Weaknesses, no rash, pt also hyas a nice bedroom set with nice pillow and doesnot sleep on the sofa or etc,  Urinary Frequency    stating that this is a new but recurrent problem. The current episode started more than 1 week ago. The problem occurs every urination. The problem has been gradually worsening. The quality of the pain is described as burning. The pain is at a severity of 2/10. There has been no fever. is not sexually active. There is no history of pyelonephritis. Associated symptoms include frequency, hesitancy and urgency. Current Outpatient Medications   Medication Sig Dispense Refill    ferrous sulfate (IRON) 325 mg (65 mg iron) EC tablet Take 1 Tablet by mouth Before breakfast and dinner. 60 Tablet 3    cholecalciferol (VITAMIN D3) (50,000 UNITS /1250 MCG) capsule Take 1 Capsule by mouth every seven (7) days. 4 Capsule 4    pantoprazole (PROTONIX) 40 mg tablet Take 1 Tablet by mouth daily. 90 Tablet 1    ergocalciferol (ERGOCALCIFEROL) 1,250 mcg (50,000 unit) capsule Take 1 Capsule by mouth every seven (7) days. 4 Capsule 11    gabapentin (NEURONTIN) 300 mg capsule Take 1 Capsule by mouth daily as needed for Pain. 30 Capsule 0    venlafaxine (EFFEXOR) 37.5 mg tablet Take 1 Tablet by mouth two (2) times daily as needed for Pain.  60 Tablet 0    hydrocortisone (CORTAID) 1 % topical cream use thin layer to facial rash BID prn (Patient not taking: Reported on 5/20/2022) 30 g 0    diclofenac (VOLTAREN) 1 % gel Apply to affected area four (4) times daily. (Patient not taking: Reported on 5/20/2022) 100 g 5     No Known Allergies  Past Medical History:   Diagnosis Date    Arthritis     Chronic pain     Constipation     Depression     Ovarian cyst     Pelvic pain      Past Surgical History:   Procedure Laterality Date    COLONOSCOPY N/A 4/9/2019    COLONOS[facility] performed by Zeinab Cruz [facility] at [facility] ENDOSCOPY     Family History   Problem Relation Age of Onset    No Known Problems Mother     No Known Problems Father      Social History     Tobacco Use    Smoking status: Never Smoker    Smokeless tobacco: Never Used   Substance Use Topics    Alcohol use: No      Lab Results   Component Value Date/Time    Cholesterol, total 157 05/20/2022 11:12 AM    HDL Cholesterol 48 05/20/2022 11:12 AM    LDL, calculated 81 05/20/2022 11:12 AM    LDL, calculated 87 12/16/2019 11:29 AM    Triglyceride 166 (H) 05/20/2022 11:12 AM     Lab Results   Component Value Date/Time    ALT (SGPT) 13 05/20/2022 11:12 AM    Alk. phosphatase 63 05/20/2022 11:12 AM    Bilirubin, total 0.2 05/20/2022 11:12 AM    Albumin 4.3 05/20/2022 11:12 AM    Protein, total 7.1 05/20/2022 11:12 AM    PLATELET 893 33/44/4065 11:12 AM        Review of Systems   Constitutional: Negative for chills and fever. HENT: Negative for ear pain and nosebleeds. Eyes: Negative for blurred vision, pain and discharge. Respiratory: Negative for shortness of breath. Cardiovascular: Negative for chest pain and leg swelling. Gastrointestinal: Negative for constipation, diarrhea, nausea and vomiting. Genitourinary: Positive for dysuria, frequency and urgency. Musculoskeletal: Positive for back pain, joint pain and myalgias. Skin: Negative for itching and rash. Neurological: Positive for weakness and headaches. Psychiatric/Behavioral: Negative for depression. The patient has insomnia. The patient is not nervous/anxious.         Physical Exam  Vitals and nursing note reviewed. Constitutional:       Appearance: She is well-developed. HENT:      Head: Normocephalic and atraumatic. Eyes:      Conjunctiva/sclera: Conjunctivae normal.      Pupils: Pupils are equal, round, and reactive to light. Neck:      Thyroid: No thyromegaly. Vascular: No JVD. Cardiovascular:      Rate and Rhythm: Normal rate and regular rhythm. Heart sounds: Normal heart sounds. No murmur heard. No friction rub. No gallop. Pulmonary:      Effort: Pulmonary effort is normal. No respiratory distress. Breath sounds: Normal breath sounds. No stridor. No wheezing or rales. Abdominal:      General: Bowel sounds are normal. There is no distension. Palpations: Abdomen is soft. There is no mass. Tenderness: There is no abdominal tenderness. Musculoskeletal:         General: Tenderness present. Cervical back: Normal range of motion and neck supple. Thoracic back: Spasms present. Lumbar back: Spasms present. Lymphadenopathy:      Cervical: No cervical adenopathy. Skin:     Findings: No erythema or rash. Neurological:      Mental Status: She is alert and oriented to person, place, and time. Cranial Nerves: No cranial nerve deficit. Deep Tendon Reflexes: Reflexes are normal and symmetric. Psychiatric:         Behavior: Behavior normal.         ASSESSMENT and PLAN  Diagnoses and all orders for this visit:    1. Neck pain  -     REFERRAL TO PHYSICAL THERAPY  -     REFERRAL TO ORTHOPEDICS    2. Acute cystitis without hematuria  -     nitrofurantoin (MACRODANTIN) 100 mg capsule; Take 1 Capsule by mouth two (2) times a day for 7 days. 3. Pain in both hands  -     REFERRAL TO PHYSICAL THERAPY  -     REFERRAL TO ORTHOPEDICS    4. Gastroesophageal reflux disease without esophagitis  -     pantoprazole (PROTONIX) 40 mg tablet; Take 1 Tablet by mouth daily. 5. Vitamin D deficiency  -     cholecalciferol (VITAMIN D3) (50,000 UNITS /1250 MCG) capsule;  Take 1 Capsule by mouth every seven (7) days. 6. Other iron deficiency anemia  -     ferrous sulfate (IRON) 325 mg (65 mg iron) EC tablet; Take 1 Tablet by mouth Before breakfast and dinner.       Follow-up and Dispositions    · Return in about 4 weeks (around 7/18/2022), or if symptoms worsen or fail to improve, for awv.       today pt was told to notify the pcp for the temp greated than 101 for 24 hours, any kind of chest pain, shortness of breath, nausea, vomiting, diarrhea, dizziness, falling, weakness, bleeding, any pain not relieved by medications,

## 2022-07-25 ENCOUNTER — OFFICE VISIT (OUTPATIENT)
Dept: FAMILY MEDICINE CLINIC | Age: 47
End: 2022-07-25
Payer: MEDICAID

## 2022-07-25 VITALS
TEMPERATURE: 98.9 F | SYSTOLIC BLOOD PRESSURE: 107 MMHG | RESPIRATION RATE: 17 BRPM | WEIGHT: 152 LBS | HEART RATE: 85 BPM | OXYGEN SATURATION: 97 % | BODY MASS INDEX: 30.64 KG/M2 | DIASTOLIC BLOOD PRESSURE: 74 MMHG | HEIGHT: 59 IN

## 2022-07-25 DIAGNOSIS — Z71.89 ADVICE GIVEN ABOUT COVID-19 VIRUS INFECTION: ICD-10-CM

## 2022-07-25 DIAGNOSIS — F43.10 PTSD (POST-TRAUMATIC STRESS DISORDER): Primary | ICD-10-CM

## 2022-07-25 DIAGNOSIS — E55.9 VITAMIN D DEFICIENCY: ICD-10-CM

## 2022-07-25 DIAGNOSIS — M14.60 ARTHRITIS, NEUROPATHIC: ICD-10-CM

## 2022-07-25 PROCEDURE — 99214 OFFICE O/P EST MOD 30 MIN: CPT | Performed by: FAMILY MEDICINE

## 2022-07-25 RX ORDER — VENLAFAXINE 37.5 MG/1
37.5 TABLET ORAL
Qty: 60 TABLET | Refills: 0 | Status: SHIPPED | OUTPATIENT
Start: 2022-07-25

## 2022-07-25 RX ORDER — VALACYCLOVIR HYDROCHLORIDE 1 G/1
1000 TABLET, FILM COATED ORAL 3 TIMES DAILY
Qty: 21 TABLET | Refills: 0 | Status: SHIPPED | OUTPATIENT
Start: 2022-07-25 | End: 2022-08-01

## 2022-07-25 RX ORDER — ASPIRIN 325 MG
50000 TABLET, DELAYED RELEASE (ENTERIC COATED) ORAL
Qty: 4 CAPSULE | Refills: 4 | Status: SHIPPED | OUTPATIENT
Start: 2022-07-25

## 2022-07-25 RX ORDER — GABAPENTIN 300 MG/1
300 CAPSULE ORAL
Qty: 30 CAPSULE | Refills: 1 | Status: SHIPPED | OUTPATIENT
Start: 2022-07-25
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
## 2022-07-25 NOTE — PROGRESS NOTES
Chief Complaint   Patient presents with    Follow-up       1. \"Have you been to the ER, urgent care clinic since your last visit? Hospitalized since your last visit? \" No    2. \"Have you seen or consulted any other health care providers outside of the [address] [place] [address] since your last visit? \" No     3. For patients aged 39-70: Has the patient had a colonoscopy / FIT/ Cologuard? NA - based on age      If the patient is female:    4. For patients aged 41-77: Has the patient had a mammogram within the past 2 years? NA - based on age or sex      11. For patients aged 21-65: Has the patient had a pap smear?  NA - based on age or sex    Health Maintenance Due   Topic Date Due    COVID-19 Vaccine (3 - Booster for Contapps series) 10/14/2021

## 2022-07-25 NOTE — PROGRESS NOTES
HISTORY OF PRESENT ILLNESS  Redd Reyes is a 49 y.o. female, present Depression with the anxiety and panic states of mind    nicley controlled with the current meds,    Patient state that it is getting better: pt states and reports of feeling less anxius, less guilty feeling,  less Hoplessness,ns/nh,ni,nh, less trouble with weight gain or loss, less tendency of etoh or illicit drug use, more ability of sleep, more ablitiy  to concentrate at work( he is only able to work 8-12 hrs per week at this time) and at home with the current medications,and all together a safe feeling at home and at work     Rash of the lip    Started few weeks ago not better tried alcohol washing and OTC antibiotic ointments, dosenot tingles and not pain full, states that is ++expanding red, and ++ swelled up, ++ with itchiness    Current Outpatient Medications   Medication Sig Dispense Refill    valACYclovir (VALTREX) 1 gram tablet Take 1 Tablet by mouth three (3) times daily for 7 days. 21 Tablet 0    gabapentin (NEURONTIN) 300 mg capsule Take 1 Capsule by mouth daily as needed for Pain. 30 Capsule 1    venlafaxine (EFFEXOR) 37.5 mg tablet Take 1 Tablet by mouth two (2) times daily as needed for Pain. 60 Tablet 0    cholecalciferol (VITAMIN D3) (50,000 UNITS /1250 MCG) capsule Take 1 Capsule by mouth every seven (7) days. 4 Capsule 4    ferrous sulfate (IRON) 325 mg (65 mg iron) EC tablet Take 1 Tablet by mouth Before breakfast and dinner. 60 Tablet 3    pantoprazole (PROTONIX) 40 mg tablet Take 1 Tablet by mouth daily. 90 Tablet 1    ergocalciferol (ERGOCALCIFEROL) 1,250 mcg (50,000 unit) capsule Take 1 Capsule by mouth every seven (7) days. 4 Capsule 11    hydrocortisone (CORTAID) 1 % topical cream use thin layer to facial rash BID prn 30 g 0    diclofenac (VOLTAREN) 1 % gel Apply  to affected area four (4) times daily.  100 g 5     No Known Allergies  Past Medical History:   Diagnosis Date    Arthritis     Chronic pain     Constipation Depression     Ovarian cyst     Pelvic pain      Past Surgical History:   Procedure Laterality Date    COLONOSCOPY N/A 4/9/2019    COLONOSCOPY performed by Gina Ponce MD at [facility] ENDOSCOPY     Family History   Problem Relation Age of Onset    No Known Problems Mother     No Known Problems Father      Social History     Tobacco Use    Smoking status: Never    Smokeless tobacco: Never   Substance Use Topics    Alcohol use: No      Lab Results   Component Value Date/Time    WBC 7.2 05/20/2022 11:12 AM    HGB 13.7 05/20/2022 11:12 AM    HCT 43.9 05/20/2022 11:12 AM    PLATELET 206 64/34/1433 11:12 AM    MCV 91 05/20/2022 11:12 AM     Lab Results   Component Value Date/Time    Hemoglobin A1c 5.2 05/20/2022 11:12 AM    Glucose 118 (H) 05/20/2022 11:12 AM    LDL, calculated 81 05/20/2022 11:12 AM    LDL, calculated 87 12/16/2019 11:29 AM    Creatinine 0.63 05/20/2022 11:12 AM      Lab Results   Component Value Date/Time    Cholesterol, total 157 05/20/2022 11:12 AM    HDL Cholesterol 48 05/20/2022 11:12 AM    LDL, calculated 81 05/20/2022 11:12 AM    LDL, calculated 87 12/16/2019 11:29 AM    Triglyceride 166 (H) 05/20/2022 11:12 AM        ROS    Physical Exam    ASSESSMENT and PLAN    ICD-10-CM ICD-9-CM    1. PTSD (post-traumatic stress disorder)  F43.10 309.81 gabapentin (NEURONTIN) 300 mg capsule      2.  Vitamin D deficiency  E55.9 268.9 cholecalciferol (VITAMIN D3) (50,000 UNITS /1250 MCG) capsule        lab results and schedule of future lab studies reviewed with patient

## 2022-11-02 ENCOUNTER — OFFICE VISIT (OUTPATIENT)
Dept: ORTHOPEDIC SURGERY | Age: 47
End: 2022-11-02
Payer: MEDICAID

## 2022-11-02 VITALS — WEIGHT: 150 LBS | BODY MASS INDEX: 30.24 KG/M2 | HEIGHT: 59 IN

## 2022-11-02 DIAGNOSIS — S22.32XA CLOSED FRACTURE OF ONE RIB OF LEFT SIDE, INITIAL ENCOUNTER: ICD-10-CM

## 2022-11-02 DIAGNOSIS — M25.511 RIGHT SHOULDER PAIN, UNSPECIFIED CHRONICITY: Primary | ICD-10-CM

## 2022-11-02 DIAGNOSIS — S40.022A ARM CONTUSION, LEFT, INITIAL ENCOUNTER: ICD-10-CM

## 2022-11-02 PROCEDURE — 99204 OFFICE O/P NEW MOD 45 MIN: CPT | Performed by: ORTHOPAEDIC SURGERY
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
## 2022-11-07 ENCOUNTER — OFFICE VISIT (OUTPATIENT)
Dept: FAMILY MEDICINE CLINIC | Age: 47
End: 2022-11-07
Payer: MEDICAID

## 2022-11-07 VITALS
OXYGEN SATURATION: 96 % | HEART RATE: 102 BPM | WEIGHT: 152.2 LBS | RESPIRATION RATE: 18 BRPM | SYSTOLIC BLOOD PRESSURE: 93 MMHG | BODY MASS INDEX: 30.68 KG/M2 | TEMPERATURE: 97.6 F | HEIGHT: 59 IN | DIASTOLIC BLOOD PRESSURE: 69 MMHG

## 2022-11-07 DIAGNOSIS — S22.41XS CLOSED FRACTURE OF MULTIPLE RIBS OF RIGHT SIDE, SEQUELA: ICD-10-CM

## 2022-11-07 DIAGNOSIS — M54.2 NECK PAIN: ICD-10-CM

## 2022-11-07 DIAGNOSIS — G89.29 CHRONIC PAIN OF BOTH KNEES: ICD-10-CM

## 2022-11-07 DIAGNOSIS — S13.4XXD WHIPLASH INJURIES, SUBSEQUENT ENCOUNTER: ICD-10-CM

## 2022-11-07 DIAGNOSIS — M25.512 LEFT SHOULDER PAIN, UNSPECIFIED CHRONICITY: Primary | ICD-10-CM

## 2022-11-07 DIAGNOSIS — M54.41 ACUTE MIDLINE LOW BACK PAIN WITH RIGHT-SIDED SCIATICA: ICD-10-CM

## 2022-11-07 DIAGNOSIS — M25.561 CHRONIC PAIN OF BOTH KNEES: ICD-10-CM

## 2022-11-07 DIAGNOSIS — S22.31XD OPEN FRACTURE OF ONE RIB OF RIGHT SIDE WITH ROUTINE HEALING, SUBSEQUENT ENCOUNTER: ICD-10-CM

## 2022-11-07 DIAGNOSIS — M25.562 CHRONIC PAIN OF BOTH KNEES: ICD-10-CM

## 2022-11-07 PROBLEM — M19.90 IDIOPATHIC OSTEOARTHRITIS: Status: ACTIVE | Noted: 2022-11-07

## 2022-11-07 PROCEDURE — 99214 OFFICE O/P EST MOD 30 MIN: CPT | Performed by: FAMILY MEDICINE

## 2022-11-07 RX ORDER — DICLOFENAC POTASSIUM 50 MG/1
50 TABLET, FILM COATED ORAL 3 TIMES DAILY
Qty: 60 TABLET | Refills: 0 | Status: SHIPPED | OUTPATIENT
Start: 2022-11-07

## 2022-11-07 RX ORDER — OXYCODONE HYDROCHLORIDE 5 MG/1
TABLET ORAL
COMMUNITY
Start: 2022-10-31

## 2022-11-07 RX ORDER — CYCLOBENZAPRINE HCL 10 MG
5 TABLET ORAL
Qty: 20 TABLET | Refills: 0 | Status: SHIPPED | OUTPATIENT
Start: 2022-11-07

## 2022-11-07 RX ORDER — LIDOCAINE 50 MG/G
PATCH TOPICAL
Qty: 30 EACH | Refills: 0 | Status: SHIPPED | OUTPATIENT
Start: 2022-11-07
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
## 2022-11-16 ENCOUNTER — TELEPHONE (OUTPATIENT)
Dept: FAMILY MEDICINE CLINIC | Age: 47
End: 2022-11-16
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
## 2022-11-17 NOTE — TELEPHONE ENCOUNTER
Returned call to pt , spoke with son,  requesting to speak with referral \"lady\", call transferred to [facility], referrals

## 2023-01-09 ENCOUNTER — OFFICE VISIT (OUTPATIENT)
Dept: FAMILY MEDICINE CLINIC | Age: 48
End: 2023-01-09
Payer: MEDICAID

## 2023-01-09 VITALS
DIASTOLIC BLOOD PRESSURE: 83 MMHG | TEMPERATURE: 97.9 F | OXYGEN SATURATION: 97 % | HEIGHT: 59 IN | HEART RATE: 93 BPM | RESPIRATION RATE: 16 BRPM | BODY MASS INDEX: 30.8 KG/M2 | SYSTOLIC BLOOD PRESSURE: 125 MMHG | WEIGHT: 152.8 LBS

## 2023-01-09 DIAGNOSIS — F33.41 RECURRENT MAJOR DEPRESSIVE DISORDER, IN PARTIAL REMISSION (HCC): ICD-10-CM

## 2023-01-09 DIAGNOSIS — S22.41XS CLOSED FRACTURE OF MULTIPLE RIBS OF RIGHT SIDE, SEQUELA: ICD-10-CM

## 2023-01-09 DIAGNOSIS — S22.31XD OPEN FRACTURE OF ONE RIB OF RIGHT SIDE WITH ROUTINE HEALING, SUBSEQUENT ENCOUNTER: ICD-10-CM

## 2023-01-09 DIAGNOSIS — M51.36 DDD (DEGENERATIVE DISC DISEASE), LUMBAR: Primary | ICD-10-CM

## 2023-01-09 DIAGNOSIS — M54.41 ACUTE MIDLINE LOW BACK PAIN WITH RIGHT-SIDED SCIATICA: ICD-10-CM
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
## 2023-01-09 DIAGNOSIS — F43.10 PTSD (POST-TRAUMATIC STRESS DISORDER): ICD-10-CM

## 2023-01-09 DIAGNOSIS — M54.2 NECK PAIN: ICD-10-CM

## 2023-01-09 PROCEDURE — 99214 OFFICE O/P EST MOD 30 MIN: CPT | Performed by: FAMILY MEDICINE

## 2023-01-09 RX ORDER — CYCLOBENZAPRINE HCL 10 MG
5 TABLET ORAL
Qty: 20 TABLET | Refills: 0
Start: 2023-01-09

## 2023-01-09 RX ORDER — METHYLPREDNISOLONE 4 MG/1
TABLET ORAL
Qty: 1 DOSE PACK | Refills: 0
Start: 2023-01-09

## 2023-01-09 RX ORDER — CYCLOBENZAPRINE HCL 10 MG
10 TABLET ORAL
Qty: 90 TABLET | Refills: 1
Start: 2023-01-09

## 2023-01-09 RX ORDER — DULOXETIN HYDROCHLORIDE 30 MG/1
30 CAPSULE, DELAYED RELEASE ORAL DAILY
Qty: 30 CAPSULE | Refills: 3
Start: 2023-01-09

## 2023-01-09 RX ORDER — PRAZOSIN HYDROCHLORIDE 1 MG/1
1 CAPSULE ORAL
Qty: 30 CAPSULE | Refills: 4
Start: 2023-01-09

## 2023-01-09 RX ORDER — GABAPENTIN 300 MG/1
300 CAPSULE ORAL
Qty: 30 CAPSULE | Refills: 1
Start: 2023-01-09

## 2023-01-09 RX ORDER — MELATONIN 5 MG
5 CAPSULE ORAL
Qty: 30 CAPSULE | Refills: 3
Start: 2023-01-09

## 2023-01-09 RX ORDER — MELOXICAM 15 MG/1
15 TABLET ORAL DAILY
Qty: 30 TABLET | Refills: 4
Start: 2023-01-09
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
## 2023-02-01 ENCOUNTER — NURSE TRIAGE (OUTPATIENT)
Dept: OTHER | Facility: CLINIC | Age: 48
End: 2023-02-01

## 2023-02-01 NOTE — TELEPHONE ENCOUNTER
Location of patient: [facility] [place]    Received call from [address] at [facility] with Red Flag Complaint. Subjective: Caller states \"htn\"     Current Symptoms: htn and dizziness states bp 140 's or 150's having dizziness currently , having blurred vision , back and chest hurt  also h/a , feels like falling , thirsty a lot , room is spinning does not have htn hx son calling for his mother     Onset: 3 days    Associated Symptoms: NA    Pain Severity7/10    Temperature: none     What has been tried: nothing    LMP: NA Pregnant: NA    Recommended disposition: Go to [facility]    Care advice provided, patient verbalizes understanding; denies any other questions or concerns; instructed to call back for any new or worsening symptoms. Patient/caller agrees to proceed to   Emergency Department    Attention Provider: Thank you for allowing me to participate in the care of your patient. The patient was connected to triage in response to information provided to the [facility]. Please do not respond through this encounter as the response is not directed to a shared pool.         Reason for Disposition   [6] Systolic BP  >= 804 OR Diastolic >= 419 AND [5] cardiac or neurologic symptoms (e.g., chest pain, difficulty breathing, unsteady gait, blurred vision)    Protocols used: Blood Pressure - High-ADULT-AH

## 2023-02-08 DIAGNOSIS — S22.31XD OPEN FRACTURE OF ONE RIB OF RIGHT SIDE WITH ROUTINE HEALING, SUBSEQUENT ENCOUNTER: ICD-10-CM

## 2023-02-08 DIAGNOSIS — S22.41XS CLOSED FRACTURE OF MULTIPLE RIBS OF RIGHT SIDE, SEQUELA: ICD-10-CM

## 2023-02-08 DIAGNOSIS — M54.41 ACUTE MIDLINE LOW BACK PAIN WITH RIGHT-SIDED SCIATICA: ICD-10-CM

## 2023-02-08 DIAGNOSIS — M54.2 NECK PAIN: ICD-10-CM

## 2023-02-09 RX ORDER — DICLOFENAC POTASSIUM 50 MG/1
TABLET, FILM COATED ORAL
Qty: 60 TABLET | Refills: 0 | Status: SHIPPED | OUTPATIENT
Start: 2023-02-09

## 2023-02-27 ENCOUNTER — TRANSCRIBE ORDER (OUTPATIENT)
Dept: SCHEDULING | Age: 48
End: 2023-02-27

## 2023-02-27 DIAGNOSIS — M54.16 LUMBAR RADICULITIS: Primary | ICD-10-CM

## 2023-02-27 DIAGNOSIS — M47.816 LUMBAR SPONDYLOSIS: ICD-10-CM
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
## 2023-03-27 ENCOUNTER — OFFICE VISIT (OUTPATIENT)
Dept: FAMILY MEDICINE CLINIC | Age: 48
End: 2023-03-27
Payer: MEDICAID

## 2023-03-27 VITALS
OXYGEN SATURATION: 97 % | TEMPERATURE: 97.9 F | HEART RATE: 86 BPM | WEIGHT: 153 LBS | RESPIRATION RATE: 16 BRPM | HEIGHT: 59 IN | SYSTOLIC BLOOD PRESSURE: 107 MMHG | BODY MASS INDEX: 30.84 KG/M2 | DIASTOLIC BLOOD PRESSURE: 75 MMHG

## 2023-03-27 DIAGNOSIS — G89.29 CHRONIC PAIN OF BOTH KNEES: ICD-10-CM

## 2023-03-27 DIAGNOSIS — S22.41XS CLOSED FRACTURE OF MULTIPLE RIBS OF RIGHT SIDE, SEQUELA: ICD-10-CM

## 2023-03-27 DIAGNOSIS — M54.41 CHRONIC MIDLINE LOW BACK PAIN WITH RIGHT-SIDED SCIATICA: ICD-10-CM

## 2023-03-27 DIAGNOSIS — M25.561 CHRONIC PAIN OF BOTH KNEES: ICD-10-CM

## 2023-03-27 DIAGNOSIS — F43.10 PTSD (POST-TRAUMATIC STRESS DISORDER): ICD-10-CM

## 2023-03-27 DIAGNOSIS — M25.562 CHRONIC PAIN OF BOTH KNEES: ICD-10-CM

## 2023-03-27 DIAGNOSIS — M79.642 BILATERAL HAND PAIN: ICD-10-CM

## 2023-03-27 DIAGNOSIS — M51.36 DDD (DEGENERATIVE DISC DISEASE), LUMBAR: Primary | ICD-10-CM

## 2023-03-27 DIAGNOSIS — M54.2 NECK PAIN: ICD-10-CM

## 2023-03-27 DIAGNOSIS — G89.29 CHRONIC MIDLINE LOW BACK PAIN WITH RIGHT-SIDED SCIATICA: ICD-10-CM

## 2023-03-27 DIAGNOSIS — M79.641 BILATERAL HAND PAIN: ICD-10-CM

## 2023-03-27 PROCEDURE — 99214 OFFICE O/P EST MOD 30 MIN: CPT | Performed by: FAMILY MEDICINE

## 2023-03-27 RX ORDER — SERTRALINE HYDROCHLORIDE 50 MG/1
50 TABLET, FILM COATED ORAL DAILY
COMMUNITY
Start: 2023-03-08

## 2023-03-27 RX ORDER — DICLOFENAC SODIUM 10 MG/G
GEL TOPICAL 4 TIMES DAILY
Qty: 100 G | Refills: 5 | Status: SHIPPED | OUTPATIENT
Start: 2023-03-27

## 2023-03-27 RX ORDER — DOXYCYCLINE 100 MG/1
100 CAPSULE ORAL 2 TIMES DAILY
Qty: 20 CAPSULE | Refills: 0 | Status: SHIPPED | OUTPATIENT
Start: 2023-03-27 | End: 2023-04-06

## 2023-03-27 RX ORDER — GABAPENTIN 300 MG/1
300 CAPSULE ORAL
Qty: 30 CAPSULE | Refills: 1 | Status: SHIPPED | OUTPATIENT
Start: 2023-03-27

## 2023-03-27 RX ORDER — RAMELTEON 8 MG/1
8 TABLET ORAL
COMMUNITY
Start: 2023-03-09
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
## 2023-03-27 NOTE — PROGRESS NOTES
Identified pt with two pt identifiers(name and ). Reviewed record in preparation for visit and have obtained necessary documentation. Chief Complaint   Patient presents with    Elevated Blood Pressure      1-2 months     Hand Pain     Both hands         Vitals:    23 1042   BP: 107/75   Pulse: 86   Resp: 16   Temp: 97.9 °F (36.6 °C)   TempSrc: Oral   SpO2: 97%   Weight: 153 lb (69.4 kg)   Height: 4' 11\" (1.499 m)   PainSc:   8   PainLoc: Hand       There are no preventive care reminders to display for this patient. Coordination of Care Questionnaire:  :   1) Have you been to an emergency room, urgent care, or hospitalized since your last visit? If yes, where when, and reason for visit? no       2. Have seen or consulted any other health care provider since your last visit? If yes, where when, and reason for visit? NO      Patient is accompanied by son I have received verbal consent from [facility] to discuss any/all medical information while they are present in the room.

## 2023-04-03 ENCOUNTER — HOSPITAL ENCOUNTER (OUTPATIENT)
Dept: MRI IMAGING | Age: 48
End: 2023-04-03
Attending: PHYSICAL MEDICINE & REHABILITATION
Payer: MEDICAID
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
## 2023-04-03 DIAGNOSIS — M54.16 LUMBAR RADICULITIS: ICD-10-CM

## 2023-04-03 DIAGNOSIS — M47.816 LUMBAR SPONDYLOSIS: ICD-10-CM

## 2023-04-03 PROCEDURE — 72148 MRI LUMBAR SPINE W/O DYE: CPT

## 2023-04-19 ENCOUNTER — OFFICE VISIT (OUTPATIENT)
Dept: FAMILY MEDICINE CLINIC | Age: 48
End: 2023-04-19
Payer: MEDICAID

## 2023-04-19 VITALS
WEIGHT: 151.2 LBS | HEART RATE: 98 BPM | TEMPERATURE: 98.3 F | SYSTOLIC BLOOD PRESSURE: 110 MMHG | OXYGEN SATURATION: 98 % | HEIGHT: 58 IN | DIASTOLIC BLOOD PRESSURE: 77 MMHG | BODY MASS INDEX: 31.74 KG/M2 | RESPIRATION RATE: 20 BRPM

## 2023-04-19 DIAGNOSIS — R19.7 DIARRHEA DUE TO MALABSORPTION: ICD-10-CM

## 2023-04-19 DIAGNOSIS — K90.9 DIARRHEA DUE TO MALABSORPTION: ICD-10-CM

## 2023-04-19 DIAGNOSIS — F33.41 RECURRENT MAJOR DEPRESSIVE DISORDER, IN PARTIAL REMISSION (HCC): Primary | ICD-10-CM

## 2023-04-19 DIAGNOSIS — F51.04 PSYCHOPHYSIOLOGICAL INSOMNIA: ICD-10-CM

## 2023-04-19 DIAGNOSIS — F51.4 NIGHT TERROR DISORDER: ICD-10-CM

## 2023-04-19 DIAGNOSIS — G25.81 RESTLESS LEGS SYNDROME (RLS): ICD-10-CM

## 2023-04-19 PROCEDURE — 99214 OFFICE O/P EST MOD 30 MIN: CPT | Performed by: FAMILY MEDICINE

## 2023-04-19 RX ORDER — LANOLIN ALCOHOL/MO/W.PET/CERES
1000 CREAM (GRAM) TOPICAL DAILY
Qty: 30 TABLET | Refills: 6 | Status: SHIPPED | OUTPATIENT
Start: 2023-04-19

## 2023-04-19 RX ORDER — LOPERAMIDE HCL 2 MG
2 TABLET ORAL
Qty: 20 TABLET | Refills: 0 | Status: SHIPPED | OUTPATIENT
Start: 2023-04-19 | End: 2023-04-29

## 2023-04-19 NOTE — PROGRESS NOTES
Chief Complaint   Patient presents with    Insomnia     Night terror        1. \"Have you been to the ER, urgent care clinic since your last visit? Hospitalized since your last visit? \" No    2. \"Have you seen or consulted any other health care providers outside of the [address] [place] [address] since your last visit? \"  Marva Sanches for lower back pain; MRI conducted. 3. For patients aged 39-70: Has the patient had a colonoscopy / FIT/ Cologuard? Yes - no Care Gap present      If the patient is female:    4. For patients aged 41-77: Has the patient had a mammogram within the past 2 years? Yes - no Care Gap present      5. For patients aged 21-65: Has the patient had a pap smear? Yes - no Care Gap present    There are no preventive care reminders to display for this patient. Verified patient with two type of identifiers.

## 2023-04-19 NOTE — PROGRESS NOTES
HISTORY OF PRESENT ILLNESS  Danni Payne is a 51 y.o. female with. Past Medical History:   Diagnosis Date    Arthritis     Chronic pain     Constipation     Depression     Ovarian cyst     Pelvic pain      present with hx of chronic Insomnia with restlessness, night terror due to the PTSD, pt has no suicidal, no homicidal ideations, currently living with her daughter and her son sharing a bedroom with her daughter stating that she wakes up multiple times screaming and yelling without knowing unfortunately the daughter not able to help she awakens multiple times yelling and screaming patient requesting to have 3 bedroom apartment currently living in state owned apartment buildings stating that current 2 bedroom apartment is not helping her condition patient problem is not falling asleep, the problem is staying asleep,   patient has tried all the available over-the-counter home remedies sleeping aids in the past and none has been helping,    never abused any meds,   patient denies any suicidal homicidal ideation having no delusion no hallucination, at this time she requesting a letter for her residential in order to get extra room to avoid awakening her daughter and disturbing her daughter daily activity    Current Outpatient Medications   Medication Sig Dispense Refill    diclofenac (VOLTAREN) 1 % gel Apply  to affected area four (4) times daily. 100 g 5    gabapentin (NEURONTIN) 300 mg capsule Take 1 Capsule by mouth daily as needed for Pain. 30 Capsule 1    sertraline (ZOLOFT) 50 mg tablet Take 1 Tablet by mouth daily. ramelteon (ROZEREM) 8 mg tablet Take 1 Tablet by mouth nightly. diclofenac potassium (CATAFLAM) 50 mg tablet TAKE 1 TABLET BY MOUTH THREE TIMES A DAY 60 Tablet 0    meloxicam (MOBIC) 15 mg tablet Take 1 Tablet by mouth daily. 30 Tablet 4    melatonin 5 mg cap capsule Take 1 Capsule by mouth nightly. 30 Capsule 3    prazosin (MINIPRESS) 1 mg capsule Take 1 Capsule by mouth nightly.  27 Capsule 4    DULoxetine (CYMBALTA) 30 mg capsule Take 1 Capsule by mouth daily. 30 Capsule 3    cyclobenzaprine (FLEXERIL) 10 mg tablet Take 1 Tablet by mouth three (3) times daily as needed for Muscle Spasm(s). 90 Tablet 1    cyclobenzaprine (FLEXERIL) 10 mg tablet Take 0.5 Tablets by mouth two (2) times daily as needed for Muscle Spasm(s). 20 Tablet 0    methylPREDNISolone (MEDROL DOSEPACK) 4 mg tablet As directed for 6 days one package (Patient not taking: Reported on 3/27/2023) 1 Dose Pack 0    lidocaine (LIDODERM) 5 % Apply patch to the affected area for 12 hours a day and remove for 12 hours a day. (Patient not taking: Reported on 1/9/2023) 30 Each 0    venlafaxine (EFFEXOR) 37.5 mg tablet Take 1 Tablet by mouth two (2) times daily as needed for Pain. (Patient not taking: Reported on 1/9/2023) 60 Tablet 0    cholecalciferol (VITAMIN D3) (50,000 UNITS /1250 MCG) capsule Take 1 Capsule by mouth every seven (7) days. (Patient not taking: Reported on 1/9/2023) 4 Capsule 4    ferrous sulfate (IRON) 325 mg (65 mg iron) EC tablet Take 1 Tablet by mouth Before breakfast and dinner. (Patient not taking: Reported on 1/9/2023) 60 Tablet 3    pantoprazole (PROTONIX) 40 mg tablet Take 1 Tablet by mouth daily. (Patient not taking: Reported on 1/9/2023) 90 Tablet 1    ergocalciferol (ERGOCALCIFEROL) 1,250 mcg (50,000 unit) capsule Take 1 Capsule by mouth every seven (7) days.  (Patient not taking: Reported on 1/9/2023) 4 Capsule 11     No Known Allergies  Past Medical History:   Diagnosis Date    Arthritis     Chronic pain     Constipation     Depression     Ovarian cyst     Pelvic pain      Past Surgical History:   Procedure Laterality Date    COLONOSCOPY N/A 4/9/2019    COLONOSCOPY performed by Iván Sanford MD at [facility] ENDOSCOPY     Family History   Problem Relation Age of Onset    No Known Problems Mother     No Known Problems Father      Social History     Tobacco Use    Smoking status: Never    Smokeless tobacco: Never Substance Use Topics    Alcohol use: No      Lab Results   Component Value Date/Time    Hemoglobin A1c 5.2 05/20/2022 11:12 AM    Glucose 118 (H) 05/20/2022 11:12 AM    LDL, calculated 81 05/20/2022 11:12 AM    LDL, calculated 87 12/16/2019 11:29 AM    Creatinine 0.63 05/20/2022 11:12 AM         Review of Systems   Constitutional:  Negative for chills and fever. HENT:  Negative for congestion and nosebleeds. Eyes:  Negative for blurred vision and pain. Respiratory:  Negative for cough, shortness of breath and wheezing. Cardiovascular:  Negative for chest pain and leg swelling. Gastrointestinal:  Negative for constipation, diarrhea, nausea and vomiting. Genitourinary:  Negative for dysuria and frequency. Musculoskeletal:  Negative for joint pain and myalgias. Skin:  Negative for itching and rash. Neurological:  Negative for dizziness, loss of consciousness and headaches. Psychiatric/Behavioral:  Positive for depression. Negative for hallucinations, substance abuse and suicidal ideas. The patient is nervous/anxious and has insomnia. Physical Exam  Vitals and nursing note reviewed. Constitutional:       Appearance: She is well-developed. HENT:      Head: Normocephalic and atraumatic. Eyes:      Conjunctiva/sclera: Conjunctivae normal.      Pupils: Pupils are equal, round, and reactive to light. Neck:      Thyroid: No thyromegaly. Vascular: No JVD. Cardiovascular:      Rate and Rhythm: Normal rate and regular rhythm. Heart sounds: Normal heart sounds. No murmur heard. No friction rub. No gallop. Pulmonary:      Effort: Pulmonary effort is normal. No respiratory distress. Breath sounds: Normal breath sounds. No stridor. No wheezing or rales. Abdominal:      General: Bowel sounds are normal. There is no distension. Palpations: Abdomen is soft. There is no mass. Tenderness: There is no abdominal tenderness.    Musculoskeletal:         General: No tenderness. Normal range of motion. Lymphadenopathy:      Cervical: No cervical adenopathy. Skin:     Findings: No erythema or rash. Neurological:      Mental Status: She is alert and oriented to person, place, and time. Cranial Nerves: No cranial nerve deficit. Deep Tendon Reflexes: Reflexes are normal and symmetric. Psychiatric:         Behavior: Behavior normal.       ASSESSMENT and PLAN    ICD-10-CM ICD-9-CM    1. Recurrent major depressive disorder, in partial remission (HCC)  F33.41 296.35 loperamide (IMMODIUM) 2 mg tablet      cyanocobalamin (Vitamin B-12) 1,000 mcg tablet      SLEEP MEDICINE REFERRAL      2. Night terror disorder  F51.4 307.46 loperamide (IMMODIUM) 2 mg tablet      cyanocobalamin (Vitamin B-12) 1,000 mcg tablet      SLEEP MEDICINE REFERRAL      3. Psychophysiological insomnia  F51.04 307.42 loperamide (IMMODIUM) 2 mg tablet      cyanocobalamin (Vitamin B-12) 1,000 mcg tablet      SLEEP MEDICINE REFERRAL      4. Restless legs syndrome (RLS)  G25.81 333.94 loperamide (IMMODIUM) 2 mg tablet      cyanocobalamin (Vitamin B-12) 1,000 mcg tablet      SLEEP MEDICINE REFERRAL      5.  Diarrhea due to malabsorption  K90.9 579.9 loperamide (IMMODIUM) 2 mg tablet    R19.7 787.91 cyanocobalamin (Vitamin B-12) 1,000 mcg tablet      SLEEP MEDICINE REFERRAL          lab results and schedule of future lab studies reviewed with patient  reviewed diet, exercise and weight control

## 2023-04-21 ENCOUNTER — TELEPHONE (OUTPATIENT)
Dept: SLEEP MEDICINE | Age: 48
End: 2023-04-21

## 2023-04-22 DIAGNOSIS — M54.16 LUMBAR RADICULITIS: Primary | ICD-10-CM
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
## 2023-04-22 DIAGNOSIS — M47.816 LUMBAR SPONDYLOSIS: ICD-10-CM

## 2023-05-07 RX ORDER — CYCLOBENZAPRINE HCL 10 MG
5 TABLET ORAL 2 TIMES DAILY PRN
COMMUNITY
Start: 2023-01-09

## 2023-06-19 ENCOUNTER — OFFICE VISIT (OUTPATIENT)
Age: 48
End: 2023-06-19
Payer: MEDICAID

## 2023-06-19 VITALS
WEIGHT: 154 LBS | RESPIRATION RATE: 16 BRPM | SYSTOLIC BLOOD PRESSURE: 117 MMHG | DIASTOLIC BLOOD PRESSURE: 80 MMHG | TEMPERATURE: 98 F | HEART RATE: 83 BPM | OXYGEN SATURATION: 95 % | BODY MASS INDEX: 32.32 KG/M2 | HEIGHT: 58 IN

## 2023-06-19 DIAGNOSIS — F33.41 RECURRENT MAJOR DEPRESSIVE DISORDER, IN PARTIAL REMISSION (HCC): ICD-10-CM

## 2023-06-19 DIAGNOSIS — F43.10 PTSD (POST-TRAUMATIC STRESS DISORDER): ICD-10-CM

## 2023-06-19 DIAGNOSIS — M79.7 FIBROMYALGIA: ICD-10-CM

## 2023-06-19 DIAGNOSIS — M25.59 PAIN IN OTHER JOINT: ICD-10-CM

## 2023-06-19 DIAGNOSIS — M25.50 CHRONIC PAIN OF MULTIPLE JOINTS: Primary | ICD-10-CM

## 2023-06-19 DIAGNOSIS — G89.29 CHRONIC PAIN OF MULTIPLE JOINTS: Primary | ICD-10-CM

## 2023-06-19 PROCEDURE — 99214 OFFICE O/P EST MOD 30 MIN: CPT | Performed by: FAMILY MEDICINE

## 2023-06-19 RX ORDER — ESZOPICLONE 1 MG/1
TABLET, FILM COATED ORAL
COMMUNITY
Start: 2023-05-19

## 2023-06-19 RX ORDER — LANOLIN ALCOHOL/MO/W.PET/CERES
1000 CREAM (GRAM) TOPICAL DAILY
COMMUNITY
Start: 2023-05-23

## 2023-06-19 RX ORDER — VENLAFAXINE 25 MG/1
25 TABLET ORAL 3 TIMES DAILY
Qty: 90 TABLET | Refills: 3 | Status: SHIPPED | OUTPATIENT
Start: 2023-06-19

## 2023-06-19 SDOH — ECONOMIC STABILITY: FOOD INSECURITY: WITHIN THE PAST 12 MONTHS, THE FOOD YOU BOUGHT JUST DIDN'T LAST AND YOU DIDN'T HAVE MONEY TO GET MORE.: NEVER TRUE

## 2023-06-19 SDOH — ECONOMIC STABILITY: FOOD INSECURITY: WITHIN THE PAST 12 MONTHS, YOU WORRIED THAT YOUR FOOD WOULD RUN OUT BEFORE YOU GOT MONEY TO BUY MORE.: NEVER TRUE

## 2023-06-19 SDOH — ECONOMIC STABILITY: INCOME INSECURITY: HOW HARD IS IT FOR YOU TO PAY FOR THE VERY BASICS LIKE FOOD, HOUSING, MEDICAL CARE, AND HEATING?: NOT HARD AT ALL

## 2023-06-19 SDOH — ECONOMIC STABILITY: HOUSING INSECURITY
IN THE LAST 12 MONTHS, WAS THERE A TIME WHEN YOU DID NOT HAVE A STEADY PLACE TO SLEEP OR SLEPT IN A SHELTER (INCLUDING NOW)?: NO

## 2023-06-19 ASSESSMENT — PATIENT HEALTH QUESTIONNAIRE - PHQ9
8. MOVING OR SPEAKING SO SLOWLY THAT OTHER PEOPLE COULD HAVE NOTICED. OR THE OPPOSITE, BEING SO FIGETY OR RESTLESS THAT YOU HAVE BEEN MOVING AROUND A LOT MORE THAN USUAL: 0
SUM OF ALL RESPONSES TO PHQ QUESTIONS 1-9: 3
6. FEELING BAD ABOUT YOURSELF - OR THAT YOU ARE A FAILURE OR HAVE LET YOURSELF OR YOUR FAMILY DOWN: 0
SUM OF ALL RESPONSES TO PHQ QUESTIONS 1-9: 3
9. THOUGHTS THAT YOU WOULD BE BETTER OFF DEAD, OR OF HURTING YOURSELF: 0
SUM OF ALL RESPONSES TO PHQ QUESTIONS 1-9: 3
10. IF YOU CHECKED OFF ANY PROBLEMS, HOW DIFFICULT HAVE THESE PROBLEMS MADE IT FOR YOU TO DO YOUR WORK, TAKE CARE OF THINGS AT HOME, OR GET ALONG WITH OTHER PEOPLE: 1
SUM OF ALL RESPONSES TO PHQ9 QUESTIONS 1 & 2: 2
1. LITTLE INTEREST OR PLEASURE IN DOING THINGS: 1
5. POOR APPETITE OR OVEREATING: 0
4. FEELING TIRED OR HAVING LITTLE ENERGY: 0
3. TROUBLE FALLING OR STAYING ASLEEP: 1
7. TROUBLE CONCENTRATING ON THINGS, SUCH AS READING THE NEWSPAPER OR WATCHING TELEVISION: 0
2. FEELING DOWN, DEPRESSED OR HOPELESS: 1
SUM OF ALL RESPONSES TO PHQ QUESTIONS 1-9: 3

## 2023-08-10 ENCOUNTER — OFFICE VISIT (OUTPATIENT)
Age: 48
End: 2023-08-10
Payer: MEDICAID

## 2023-08-10 VITALS
WEIGHT: 152 LBS | DIASTOLIC BLOOD PRESSURE: 77 MMHG | RESPIRATION RATE: 18 BRPM | HEART RATE: 84 BPM | OXYGEN SATURATION: 95 % | SYSTOLIC BLOOD PRESSURE: 109 MMHG | TEMPERATURE: 98.1 F | BODY MASS INDEX: 31.77 KG/M2

## 2023-08-10 DIAGNOSIS — F51.4 SLEEP TERRORS (NIGHT TERRORS): ICD-10-CM

## 2023-08-10 DIAGNOSIS — M25.50 CHRONIC PAIN OF MULTIPLE JOINTS: ICD-10-CM

## 2023-08-10 DIAGNOSIS — F33.41 RECURRENT MAJOR DEPRESSIVE DISORDER, IN PARTIAL REMISSION (HCC): Primary | ICD-10-CM

## 2023-08-10 DIAGNOSIS — F33.41 MAJOR DEPRESSIVE DISORDER, RECURRENT, IN PARTIAL REMISSION (HCC): ICD-10-CM

## 2023-08-10 DIAGNOSIS — G25.81 RESTLESS LEGS SYNDROME: ICD-10-CM

## 2023-08-10 DIAGNOSIS — M79.7 FIBROMYALGIA: ICD-10-CM

## 2023-08-10 DIAGNOSIS — G44.321 INTRACTABLE CHRONIC POST-TRAUMATIC HEADACHE: ICD-10-CM

## 2023-08-10 DIAGNOSIS — F43.10 PTSD (POST-TRAUMATIC STRESS DISORDER): ICD-10-CM

## 2023-08-10 DIAGNOSIS — G89.29 CHRONIC PAIN OF MULTIPLE JOINTS: ICD-10-CM

## 2023-08-10 DIAGNOSIS — F51.04 PSYCHOPHYSIOLOGIC INSOMNIA: ICD-10-CM

## 2023-08-10 PROCEDURE — 99214 OFFICE O/P EST MOD 30 MIN: CPT | Performed by: FAMILY MEDICINE

## 2023-08-10 RX ORDER — PANTOPRAZOLE SODIUM 40 MG/1
40 TABLET, DELAYED RELEASE ORAL DAILY
Qty: 30 TABLET | Refills: 4 | Status: SHIPPED | OUTPATIENT
Start: 2023-08-10

## 2023-08-10 RX ORDER — PRAZOSIN HYDROCHLORIDE 2 MG/1
CAPSULE ORAL
COMMUNITY
Start: 2023-07-19

## 2023-08-10 RX ORDER — AZELASTINE HYDROCHLORIDE 0.5 MG/ML
1 SOLUTION/ DROPS OPHTHALMIC 2 TIMES DAILY
Qty: 6 ML | Refills: 1 | Status: SHIPPED | OUTPATIENT
Start: 2023-08-10 | End: 2023-09-09

## 2023-08-10 RX ORDER — MELOXICAM 15 MG/1
15 TABLET ORAL DAILY
Qty: 30 TABLET | Refills: 3 | Status: SHIPPED | OUTPATIENT
Start: 2023-08-10

## 2023-08-10 RX ORDER — CYCLOBENZAPRINE HCL 10 MG
5 TABLET ORAL 2 TIMES DAILY PRN
Qty: 40 TABLET | Refills: 2 | Status: SHIPPED | OUTPATIENT
Start: 2023-08-10

## 2023-08-10 RX ORDER — ESZOPICLONE 2 MG/1
TABLET, FILM COATED ORAL
COMMUNITY
Start: 2023-07-19

## 2023-08-10 RX ORDER — SERTRALINE HYDROCHLORIDE 100 MG/1
100 TABLET, FILM COATED ORAL EVERY MORNING
COMMUNITY
Start: 2023-07-19

## 2023-08-10 ASSESSMENT — PATIENT HEALTH QUESTIONNAIRE - PHQ9
5. POOR APPETITE OR OVEREATING: 0
9. THOUGHTS THAT YOU WOULD BE BETTER OFF DEAD, OR OF HURTING YOURSELF: 0
1. LITTLE INTEREST OR PLEASURE IN DOING THINGS: 0
2. FEELING DOWN, DEPRESSED OR HOPELESS: 0
SUM OF ALL RESPONSES TO PHQ QUESTIONS 1-9: 1
SUM OF ALL RESPONSES TO PHQ QUESTIONS 1-9: 1
6. FEELING BAD ABOUT YOURSELF - OR THAT YOU ARE A FAILURE OR HAVE LET YOURSELF OR YOUR FAMILY DOWN: 0
7. TROUBLE CONCENTRATING ON THINGS, SUCH AS READING THE NEWSPAPER OR WATCHING TELEVISION: 0
SUM OF ALL RESPONSES TO PHQ QUESTIONS 1-9: 1
SUM OF ALL RESPONSES TO PHQ9 QUESTIONS 1 & 2: 0
10. IF YOU CHECKED OFF ANY PROBLEMS, HOW DIFFICULT HAVE THESE PROBLEMS MADE IT FOR YOU TO DO YOUR WORK, TAKE CARE OF THINGS AT HOME, OR GET ALONG WITH OTHER PEOPLE: 0
3. TROUBLE FALLING OR STAYING ASLEEP: 1
4. FEELING TIRED OR HAVING LITTLE ENERGY: 0
SUM OF ALL RESPONSES TO PHQ QUESTIONS 1-9: 1
8. MOVING OR SPEAKING SO SLOWLY THAT OTHER PEOPLE COULD HAVE NOTICED. OR THE OPPOSITE, BEING SO FIGETY OR RESTLESS THAT YOU HAVE BEEN MOVING AROUND A LOT MORE THAN USUAL: 0

## 2023-08-11 LAB
25(OH)D3+25(OH)D2 SERPL-MCNC: 25.1 NG/ML (ref 30–100)
ALBUMIN SERPL-MCNC: 4.5 G/DL (ref 3.9–4.9)
ALBUMIN/GLOB SERPL: 1.7 {RATIO} (ref 1.2–2.2)
ALP SERPL-CCNC: 72 IU/L (ref 44–121)
ALT SERPL-CCNC: 12 IU/L (ref 0–32)
AST SERPL-CCNC: 19 IU/L (ref 0–40)
BILIRUB SERPL-MCNC: 0.6 MG/DL (ref 0–1.2)
BUN SERPL-MCNC: 10 MG/DL (ref 6–24)
BUN/CREAT SERPL: 15 (ref 9–23)
CALCIUM SERPL-MCNC: 9.4 MG/DL (ref 8.7–10.2)
CHLORIDE SERPL-SCNC: 103 MMOL/L (ref 96–106)
CHOLEST SERPL-MCNC: 182 MG/DL (ref 100–199)
CO2 SERPL-SCNC: 22 MMOL/L (ref 20–29)
CREAT SERPL-MCNC: 0.66 MG/DL (ref 0.57–1)
EGFRCR SERPLBLD CKD-EPI 2021: 107 ML/MIN/1.73
ERYTHROCYTE [DISTWIDTH] IN BLOOD BY AUTOMATED COUNT: 13.4 % (ref 11.7–15.4)
GLOBULIN SER CALC-MCNC: 2.7 G/DL (ref 1.5–4.5)
GLUCOSE SERPL-MCNC: 86 MG/DL (ref 70–99)
HBA1C MFR BLD: 5 % (ref 4.8–5.6)
HCT VFR BLD AUTO: 40.5 % (ref 34–46.6)
HDLC SERPL-MCNC: 59 MG/DL
HGB BLD-MCNC: 13.5 G/DL (ref 11.1–15.9)
LDLC SERPL CALC-MCNC: 101 MG/DL (ref 0–99)
MCH RBC QN AUTO: 28.8 PG (ref 26.6–33)
MCHC RBC AUTO-ENTMCNC: 33.3 G/DL (ref 31.5–35.7)
MCV RBC AUTO: 87 FL (ref 79–97)
PLATELET # BLD AUTO: 288 X10E3/UL (ref 150–450)
POTASSIUM SERPL-SCNC: 4.5 MMOL/L (ref 3.5–5.2)
PROT SERPL-MCNC: 7.2 G/DL (ref 6–8.5)
RBC # BLD AUTO: 4.68 X10E6/UL (ref 3.77–5.28)
SODIUM SERPL-SCNC: 138 MMOL/L (ref 134–144)
T4 FREE SERPL-MCNC: 1.25 NG/DL (ref 0.82–1.77)
TRIGL SERPL-MCNC: 127 MG/DL (ref 0–149)
TSH SERPL DL<=0.005 MIU/L-ACNC: 5.05 UIU/ML (ref 0.45–4.5)
VLDLC SERPL CALC-MCNC: 22 MG/DL (ref 5–40)
WBC # BLD AUTO: 5.6 X10E3/UL (ref 3.4–10.8)

## 2023-08-31 NOTE — PROGRESS NOTES
Brandee Manning (:  1975) is a 51 y.o. female,Established patient, here for evaluation of the following chief complaint(s):  Follow-up and Eye Pain (Right eye pain )     Patient presents stating that has been diagnosed with fibromyalgia for many years  w/ history of diabetic state with diabetic neuropathy, with widespread musculoskeletal pain, w/ daily fatigue, cognitive disturbance, psychiatric symptoms, and multiple somatic symptoms, the pain affecting the muscles, ligaments, and tendons,  and sleep disturbances,  today physical examination revealed + tenderness in multiple soft tissue anatomic locations, no obvious abnormalities on physical examination other than widespread soft tissue tenderness,     Patient also requesting a letter of recommendation concerning her current resident patient having multiple family member in a small apartment unfortunately affecting her anxiety state denies any suicidal homicidal ideation has been compliant with her Zoloft she states it is helping as long as she is not at home she lives in a very small place and she gets easily irritated and gets to panic attack with multiple family members requesting a letter for her worsening condition due his closed space at home,     Current Outpatient Medications   Medication Sig Dispense Refill    pantoprazole (PROTONIX) 40 MG tablet Take 1 tablet by mouth daily 30 tablet 4    meloxicam (MOBIC) 15 MG tablet Take 1 tablet by mouth daily 30 tablet 3    cyclobenzaprine (FLEXERIL) 10 MG tablet Take 0.5 tablets by mouth 2 times daily as needed for Muscle spasms 40 tablet 2    azelastine (OPTIVAR) 0.05 % ophthalmic solution Place 1 drop into the right eye 2 times daily 6 mL 1    sertraline (ZOLOFT) 100 MG tablet Take 1 tablet by mouth every morning      eszopiclone (LUNESTA) 2 MG TABS TAKE 1 TABLET BY MOUTH EVERYDAY AT BEDTIME      prazosin (MINIPRESS) 2 MG capsule TAKE 1 CAPSULE BY MOUTH EVERYDAY AT BEDTIME      vitamin B-12

## 2023-09-14 ENCOUNTER — OFFICE VISIT (OUTPATIENT)
Age: 48
End: 2023-09-14
Payer: MEDICAID

## 2023-09-14 VITALS
BODY MASS INDEX: 31.4 KG/M2 | DIASTOLIC BLOOD PRESSURE: 67 MMHG | OXYGEN SATURATION: 96 % | WEIGHT: 149.6 LBS | HEART RATE: 77 BPM | TEMPERATURE: 98 F | HEIGHT: 58 IN | RESPIRATION RATE: 20 BRPM | SYSTOLIC BLOOD PRESSURE: 104 MMHG

## 2023-09-14 DIAGNOSIS — M51.36 DDD (DEGENERATIVE DISC DISEASE), LUMBAR: ICD-10-CM

## 2023-09-14 DIAGNOSIS — M47.816 ARTHRITIS OF LUMBAR SPINE: ICD-10-CM

## 2023-09-14 DIAGNOSIS — M54.41 CHRONIC MIDLINE LOW BACK PAIN WITH RIGHT-SIDED SCIATICA: ICD-10-CM

## 2023-09-14 DIAGNOSIS — G89.29 CHRONIC PAIN OF BOTH KNEES: Primary | ICD-10-CM

## 2023-09-14 DIAGNOSIS — F33.41 RECURRENT MAJOR DEPRESSIVE DISORDER, IN PARTIAL REMISSION (HCC): ICD-10-CM

## 2023-09-14 DIAGNOSIS — M25.50 CHRONIC PAIN OF MULTIPLE JOINTS: ICD-10-CM

## 2023-09-14 DIAGNOSIS — M25.561 CHRONIC PAIN OF BOTH KNEES: Primary | ICD-10-CM

## 2023-09-14 DIAGNOSIS — M17.11 PRIMARY OSTEOARTHRITIS OF RIGHT KNEE: ICD-10-CM

## 2023-09-14 DIAGNOSIS — G89.29 CHRONIC PAIN OF MULTIPLE JOINTS: ICD-10-CM

## 2023-09-14 DIAGNOSIS — G89.29 CHRONIC MIDLINE LOW BACK PAIN WITH RIGHT-SIDED SCIATICA: ICD-10-CM

## 2023-09-14 DIAGNOSIS — M25.562 CHRONIC PAIN OF BOTH KNEES: Primary | ICD-10-CM

## 2023-09-14 DIAGNOSIS — F43.10 PTSD (POST-TRAUMATIC STRESS DISORDER): ICD-10-CM

## 2023-09-14 PROCEDURE — 99214 OFFICE O/P EST MOD 30 MIN: CPT | Performed by: FAMILY MEDICINE

## 2023-09-14 RX ORDER — ERGOCALCIFEROL 1.25 MG/1
50000 CAPSULE ORAL WEEKLY
Qty: 12 CAPSULE | Refills: 1 | Status: SHIPPED | OUTPATIENT
Start: 2023-09-14

## 2023-09-14 SDOH — ECONOMIC STABILITY: INCOME INSECURITY: HOW HARD IS IT FOR YOU TO PAY FOR THE VERY BASICS LIKE FOOD, HOUSING, MEDICAL CARE, AND HEATING?: NOT HARD AT ALL

## 2023-09-14 SDOH — ECONOMIC STABILITY: FOOD INSECURITY: WITHIN THE PAST 12 MONTHS, THE FOOD YOU BOUGHT JUST DIDN'T LAST AND YOU DIDN'T HAVE MONEY TO GET MORE.: NEVER TRUE

## 2023-09-14 SDOH — ECONOMIC STABILITY: FOOD INSECURITY: WITHIN THE PAST 12 MONTHS, YOU WORRIED THAT YOUR FOOD WOULD RUN OUT BEFORE YOU GOT MONEY TO BUY MORE.: NEVER TRUE

## 2023-09-14 ASSESSMENT — PATIENT HEALTH QUESTIONNAIRE - PHQ9
5. POOR APPETITE OR OVEREATING: 0
9. THOUGHTS THAT YOU WOULD BE BETTER OFF DEAD, OR OF HURTING YOURSELF: 0
8. MOVING OR SPEAKING SO SLOWLY THAT OTHER PEOPLE COULD HAVE NOTICED. OR THE OPPOSITE, BEING SO FIGETY OR RESTLESS THAT YOU HAVE BEEN MOVING AROUND A LOT MORE THAN USUAL: 3
2. FEELING DOWN, DEPRESSED OR HOPELESS: 3
4. FEELING TIRED OR HAVING LITTLE ENERGY: 3
7. TROUBLE CONCENTRATING ON THINGS, SUCH AS READING THE NEWSPAPER OR WATCHING TELEVISION: 0
SUM OF ALL RESPONSES TO PHQ9 QUESTIONS 1 & 2: 6
SUM OF ALL RESPONSES TO PHQ QUESTIONS 1-9: 14
1. LITTLE INTEREST OR PLEASURE IN DOING THINGS: 3
6. FEELING BAD ABOUT YOURSELF - OR THAT YOU ARE A FAILURE OR HAVE LET YOURSELF OR YOUR FAMILY DOWN: 0
3. TROUBLE FALLING OR STAYING ASLEEP: 2
10. IF YOU CHECKED OFF ANY PROBLEMS, HOW DIFFICULT HAVE THESE PROBLEMS MADE IT FOR YOU TO DO YOUR WORK, TAKE CARE OF THINGS AT HOME, OR GET ALONG WITH OTHER PEOPLE: 1
SUM OF ALL RESPONSES TO PHQ QUESTIONS 1-9: 14

## 2023-09-14 NOTE — PROGRESS NOTES
Chief Complaint   Patient presents with    Follow-up     Generalized pain all over the top of body, back, neck, and both shoulders       1. Have you been to the ER, urgent care clinic since your last visit? Hospitalized since your last visit? No    2. Have you seen or consulted any other health care providers outside of the [address] [place] [address] since your last visit? Include any pap smears or colon screening.  No
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
## 2023-11-13 ENCOUNTER — NURSE TRIAGE (OUTPATIENT)
Dept: OTHER | Facility: CLINIC | Age: 48
End: 2023-11-13

## 2023-11-13 NOTE — TELEPHONE ENCOUNTER
Franky Gitelman    Location of patient: unsure    Received call from [facility] at [facility] with MOWGLI. Subjective: Caller states \"I am her mental health provider and only wanting to make her an appt with her PCP. \"     Current Symptoms: Triage declined    Recommended disposition:  Call PCP When Office is Open    Care advice provided, patient verbalizes understanding; denies any other questions or concerns; instructed to call back for any new or worsening symptoms. Patient/Caller agrees with recommended disposition; writer provided warm transfer to [facility] for appointment scheduling as requesting appt with PCP. Attention Provider: Thank you for allowing me to participate in the care of your patient. The patient was connected to triage in response to information provided to the ECC/PSC. Please do not respond through this encounter as the response is not directed to a shared pool. Reason for Disposition   Requesting regular office appointment    Protocols used:  Information Only Call - No Triage-ADULT-

## 2024-01-24 ENCOUNTER — OFFICE VISIT (OUTPATIENT)
Age: 49
End: 2024-01-24
Payer: MEDICAID

## 2024-01-24 VITALS
HEART RATE: 95 BPM | WEIGHT: 151 LBS | RESPIRATION RATE: 18 BRPM | OXYGEN SATURATION: 96 % | BODY MASS INDEX: 31.7 KG/M2 | HEIGHT: 58 IN | SYSTOLIC BLOOD PRESSURE: 114 MMHG | TEMPERATURE: 97.8 F | DIASTOLIC BLOOD PRESSURE: 78 MMHG

## 2024-01-24 DIAGNOSIS — M51.36 DDD (DEGENERATIVE DISC DISEASE), LUMBAR: ICD-10-CM

## 2024-01-24 DIAGNOSIS — M54.2 NECK PAIN OF OVER 3 MONTHS DURATION: Primary | ICD-10-CM

## 2024-01-24 DIAGNOSIS — N92.6 IRREGULAR PERIODS/MENSTRUAL CYCLES: ICD-10-CM

## 2024-01-24 PROCEDURE — 99213 OFFICE O/P EST LOW 20 MIN: CPT | Performed by: FAMILY MEDICINE

## 2024-01-24 RX ORDER — ESTRADIOL 0.5 MG/1
0.5 TABLET ORAL DAILY
Qty: 12 TABLET | Refills: 3 | Status: SHIPPED | OUTPATIENT
Start: 2024-01-24

## 2024-01-24 NOTE — PROGRESS NOTES
Stefanie Perez (:  1975) is a 49 y.o. female,Established patient, here for evaluation of the following chief complaint(s):  Knee Pain (5 month follow up)    neck pain   IADL are not cumbersome for the patient, pt is currently able to walk w/out any mobility device. pt took 2 tylenol almost few days ago did not help, therefore took 2 motrin did help, The problem occurs constantly. The problem has changed and not worsening since onset.   is a sharp pain and  is at a severity of 5/10.  There has been no history of extremity trauma,  no incontinence of urine nor of stool, and no upper ext Weaknesses, no rash, pt also hyas a nice bedroom set with nice pillow and doesnot sleep on the sofa or etc,         Constitutional: no chills and fever,nad     HENT: no ear pain and nosebleeds. No blurred vision,   Respiratory: no shortness of breath, wheezing cough nor sore throat.    Cardiovascular: Has no chest pain, leg swelling ,and racing heart .   Gastrointestinal: No constipation, diarrhea, nausea and vomiting.   Genitourinary: No frequency.   Musculoskeletal: +++for multiple joint pain.   Skin: no itching, pimples   Neurological: Negative for dizziness, no tremors  Psychiatric/Behavioral: Negative for depression,  not nervous/anxious.       General: Patient alert cooperative   HEENT; normocephalic and atraumatic     Neck: supple no adenopathy no JVD no bruit  Lungs: Clear to auscultation bilaterally no rales rhonchi or wheezes  Heart: Normal regular S1-S2 s no murmur  Breast exam deferred  Abdomen: Soft nontender normal bowel sounds    Extremities: abnormal range of motion ++ point tenderness normal pulses no edema,   Pelvic/: No lesion, no lymphadenopathy  Skin: abormal no lesion no rash       ASSESSMENT/PLAN:  1. Neck pain of over 3 months duration  -     Andres Foster MD, Pain Medicine, [place]  2. DDD (degenerative disc disease), lumbar  -     Andres Foster MD, Pain Medicine, [place]  3.

## 2024-01-24 NOTE — PROGRESS NOTES
Chief Complaint   Patient presents with    Knee Pain     5 month follow up     Pt states she is having shoulder pain would like a referral.  Pt states she is sweating at night and waking sweating.    \"Have you been to the ER, urgent care clinic since your last visit?  Hospitalized since your last visit?\"    NO    “Have you seen or consulted any other health care providers outside of [facility] since your last visit?”    Yes Therapist      /78 (Site: Left Upper Arm, Position: Sitting)   Pulse 95   Temp 97.8 °F (36.6 °C) (Temporal)   Resp 18   Ht 1.473 m (4' 10\")   Wt 68.5 kg (151 lb)   SpO2 96%   BMI 31.56 kg/m²

## 2024-02-07 ENCOUNTER — TELEPHONE (OUTPATIENT)
Age: 49
End: 2024-02-07

## 2024-02-07 NOTE — TELEPHONE ENCOUNTER
----- Message from Danielle Ace sent at 2/7/2024  2:32 PM EST -----  Subject: Message to Provider    QUESTIONS  Information for Provider? patient's son Cristina called stating that Dr. Valerio was to write a note to patient's apartment complex, patient would   not explain purpose of note stating that Dr. Valerio already knows the   reason  ---------------------------------------------------------------------------  --------------  CALL BACK INFO  [phone]; OK to leave message on voicemail  ---------------------------------------------------------------------------  --------------  SCRIPT ANSWERS  Relationship to Patient? Self

## 2024-03-04 RX ORDER — AZELASTINE HYDROCHLORIDE 0.5 MG/ML
SOLUTION/ DROPS OPHTHALMIC
Qty: 6 ML | Refills: 1 | Status: SHIPPED | OUTPATIENT
Start: 2024-03-04

## 2024-03-04 NOTE — TELEPHONE ENCOUNTER
Last appointment: 1/24/24  Next appointment: 3/25/24  Previous refill encounter(s): 8/10/23 #1 with 1 refill    Requested Prescriptions     Pending Prescriptions Disp Refills    azelastine (OPTIVAR) 0.05 % ophthalmic solution [Pharmacy Med Name: AZELASTINE HCL 0.05% DROPS] 6 mL 1     Sig: PLACE 1 DROP INTOTHE RIGHT EYE 2 TIMES DAILY         For Pharmacy Admin Tracking Only    Program: Medication Refill  CPA in place:    Recommendation Provided To:   Intervention Detail: New Rx: 1, reason: Patient Preference  Intervention Accepted By:   Gap Closed?:    Time Spent (min): 5

## 2024-03-25 ENCOUNTER — OFFICE VISIT (OUTPATIENT)
Age: 49
End: 2024-03-25
Payer: MEDICAID

## 2024-03-25 VITALS
TEMPERATURE: 97.5 F | WEIGHT: 149 LBS | BODY MASS INDEX: 31.14 KG/M2 | RESPIRATION RATE: 18 BRPM | DIASTOLIC BLOOD PRESSURE: 87 MMHG | HEART RATE: 88 BPM | SYSTOLIC BLOOD PRESSURE: 135 MMHG | OXYGEN SATURATION: 96 %

## 2024-03-25 DIAGNOSIS — Z02.89 ENCOUNTER FOR COMPLETION OF FORM WITH PATIENT: ICD-10-CM

## 2024-03-25 DIAGNOSIS — K21.9 GASTROESOPHAGEAL REFLUX DISEASE WITHOUT ESOPHAGITIS: ICD-10-CM

## 2024-03-25 DIAGNOSIS — F33.41 RECURRENT MAJOR DEPRESSIVE DISORDER, IN PARTIAL REMISSION (HCC): ICD-10-CM

## 2024-03-25 DIAGNOSIS — F43.10 PTSD (POST-TRAUMATIC STRESS DISORDER): Primary | ICD-10-CM

## 2024-03-25 PROCEDURE — 99214 OFFICE O/P EST MOD 30 MIN: CPT | Performed by: FAMILY MEDICINE

## 2024-03-25 RX ORDER — PANTOPRAZOLE SODIUM 40 MG/1
40 TABLET, DELAYED RELEASE ORAL DAILY
Qty: 30 TABLET | Refills: 4 | Status: SHIPPED | OUTPATIENT
Start: 2024-03-25

## 2024-03-25 ASSESSMENT — PATIENT HEALTH QUESTIONNAIRE - PHQ9
9. THOUGHTS THAT YOU WOULD BE BETTER OFF DEAD, OR OF HURTING YOURSELF: NOT AT ALL
SUM OF ALL RESPONSES TO PHQ QUESTIONS 1-9: 1
SUM OF ALL RESPONSES TO PHQ9 QUESTIONS 1 & 2: 0
8. MOVING OR SPEAKING SO SLOWLY THAT OTHER PEOPLE COULD HAVE NOTICED. OR THE OPPOSITE, BEING SO FIGETY OR RESTLESS THAT YOU HAVE BEEN MOVING AROUND A LOT MORE THAN USUAL: NOT AT ALL
3. TROUBLE FALLING OR STAYING ASLEEP: SEVERAL DAYS
SUM OF ALL RESPONSES TO PHQ QUESTIONS 1-9: 1
SUM OF ALL RESPONSES TO PHQ QUESTIONS 1-9: 1
10. IF YOU CHECKED OFF ANY PROBLEMS, HOW DIFFICULT HAVE THESE PROBLEMS MADE IT FOR YOU TO DO YOUR WORK, TAKE CARE OF THINGS AT HOME, OR GET ALONG WITH OTHER PEOPLE: SOMEWHAT DIFFICULT
4. FEELING TIRED OR HAVING LITTLE ENERGY: NOT AT ALL
5. POOR APPETITE OR OVEREATING: NOT AT ALL
7. TROUBLE CONCENTRATING ON THINGS, SUCH AS READING THE NEWSPAPER OR WATCHING TELEVISION: NOT AT ALL
6. FEELING BAD ABOUT YOURSELF - OR THAT YOU ARE A FAILURE OR HAVE LET YOURSELF OR YOUR FAMILY DOWN: NOT AT ALL
SUM OF ALL RESPONSES TO PHQ QUESTIONS 1-9: 1
1. LITTLE INTEREST OR PLEASURE IN DOING THINGS: NOT AT ALL
2. FEELING DOWN, DEPRESSED OR HOPELESS: NOT AT ALL

## 2024-03-25 NOTE — PROGRESS NOTES
Chief Complaint   Patient presents with    documentation     Apartment accommodations     Neck Pain     Back pain        \"Have you been to the ER, urgent care clinic since your last visit?  Hospitalized since your last visit?\"    NO    “Have you seen or consulted any other health care providers outside of [facility] since your last visit?”    NO         Date of last Mammogram: 2020     Date of last Cervical Cancer screen (HPV or PAP): 6/15/2018          Vitals:    24 1445   BP: 135/87   Pulse: 88   Resp: 18   Temp: 97.5 °F (36.4 °C)   TempSrc: Infrared   SpO2: 96%   Weight: 67.6 kg (149 lb)           Health Maintenance Due   Topic Date Due    Cervical cancer screen  06/15/2023    Breast cancer screen  2024        The patient, Stefanie Perez, identity was verified by name and

## 2024-03-25 NOTE — PROGRESS NOTES
Stefanie Perez (:  1975) is a 49 y.o. female,Established patient, here for evaluation of the following chief complaint(s):  documentation (Apartment accommodations ) and Neck Pain (Back pain )     Patient presents stating that has been diagnosed with fibromyalgia for many years  w/ history of diabetic state with diabetic neuropathy, with widespread musculoskeletal pain, w/ daily fatigue, cognitive disturbance, psychiatric symptoms, and multiple somatic symptoms, the pain affecting the muscles, ligaments, and tendons,  and sleep disturbances,  today physical examination revealed + tenderness in multiple soft tissue anatomic locations, no obvious abnormalities on physical examination other than widespread soft tissue tenderness,      Patient also requesting a letter of recommendation concerning her current resident patient having multiple family member in a small apartment unfortunately affecting her anxiety state denies any suicidal homicidal ideation has been compliant with her Zoloft she states it is helping as long as she is not at home she lives in a very small place and she gets easily irritated and gets to panic attack with multiple family members requesting a letter for her worsening condition due his closed space at home,       Constitutional: no chills and fever,  , nad     HENT: no ear pain or nosebleeds. No blurred vision  Respiratory: no shortness of breath, wheezing cough   Cardiovascular: Has no chest pain, ,and racing heart .   Gastrointestinal: No constipation, diarrhea, nausea and vomiting.   Genitourinary: No frequency.   Musculoskeletal: Negative for joint pain.   Skin: no itching, no rash.   Neurological: Negative for dizziness, no tremors  Psychiatric/Behavioral: +++ for depression  ++nervous/anxious.       General:  alert cooperative appears stated for the age  HEENT; normocephalic and atraumatic PERRLA extraocular motor intact normal tympanic membrane normal external ear bilaterally,

## 2024-04-29 ENCOUNTER — TELEPHONE (OUTPATIENT)
Age: 49
End: 2024-04-29

## 2024-04-29 NOTE — TELEPHONE ENCOUNTER
----- Message from Erica Ramirez sent at 4/25/2024 12:41 PM EDT -----  Subject: Message to Provider    QUESTIONS  Information for Provider? Patient has lost the referrals she asked for,   please call and give her the names and numbers so that she can schedule   the appointments.  ---------------------------------------------------------------------------  --------------  CALL BACK INFO  [phone]; OK to leave message on voicemail  ---------------------------------------------------------------------------  --------------  SCRIPT ANSWERS  Relationship to Patient? Other/Third Party  Representative Name? Cristina  Is the representative on the Communication Release of Information (OBI)   form in Epic? Yes

## 2024-05-14 ENCOUNTER — HOSPITAL ENCOUNTER (OUTPATIENT)
Facility: HOSPITAL | Age: 49
Discharge: HOME | End: 2024-05-17
Attending: FAMILY MEDICINE
Payer: MEDICAID

## 2024-05-14 DIAGNOSIS — F33.41 RECURRENT MAJOR DEPRESSIVE DISORDER, IN PARTIAL REMISSION (HCC): ICD-10-CM

## 2024-05-14 DIAGNOSIS — F43.10 PTSD (POST-TRAUMATIC STRESS DISORDER): ICD-10-CM

## 2024-05-14 PROCEDURE — 77063 BREAST TOMOSYNTHESIS BI: CPT

## 2024-07-30 NOTE — TELEPHONE ENCOUNTER
Last appointment: 3/25/24  Next appointment: no show 5/28/24  Previous refill encounter(s): 3/4/24 #6ml with 1 refill    Requested Prescriptions     Pending Prescriptions Disp Refills    azelastine (OPTIVAR) 0.05 % ophthalmic solution [Pharmacy Med Name: AZELASTINE HCL 0.05% DROPS] 6 mL 1     Sig: PLACE 1 DROP INTOTHE RIGHT EYE 2 TIMES DAILY         For Pharmacy Admin Tracking Only    Program: Medication Refill  CPA in place:    Recommendation Provided To:   Intervention Detail: New Rx: 1, reason: Patient Preference  Intervention Accepted By:   Gap Closed?:    Time Spent (min): 5

## 2024-08-01 RX ORDER — AZELASTINE HYDROCHLORIDE 0.5 MG/ML
SOLUTION/ DROPS OPHTHALMIC
Qty: 6 ML | Refills: 1 | Status: SHIPPED | OUTPATIENT
Start: 2024-08-01

## 2024-08-26 ENCOUNTER — OFFICE VISIT (OUTPATIENT)
Age: 49
End: 2024-08-26
Payer: MEDICAID

## 2024-08-26 VITALS
DIASTOLIC BLOOD PRESSURE: 74 MMHG | HEART RATE: 85 BPM | BODY MASS INDEX: 30.93 KG/M2 | OXYGEN SATURATION: 97 % | SYSTOLIC BLOOD PRESSURE: 105 MMHG | WEIGHT: 148 LBS | TEMPERATURE: 97.7 F | RESPIRATION RATE: 18 BRPM

## 2024-08-26 DIAGNOSIS — N39.3 STRESS INCONTINENCE OF URINE: ICD-10-CM

## 2024-08-26 DIAGNOSIS — M79.7 FIBROMYALGIA SYNDROME: ICD-10-CM

## 2024-08-26 DIAGNOSIS — M51.36 DDD (DEGENERATIVE DISC DISEASE), LUMBAR: ICD-10-CM

## 2024-08-26 DIAGNOSIS — G89.29 CHRONIC PAIN OF MULTIPLE JOINTS: ICD-10-CM

## 2024-08-26 DIAGNOSIS — N39.3 STRESS INCONTINENCE OF URINE: Primary | ICD-10-CM

## 2024-08-26 DIAGNOSIS — E03.8 TSH DEFICIENCY: ICD-10-CM

## 2024-08-26 DIAGNOSIS — M25.50 CHRONIC PAIN OF MULTIPLE JOINTS: ICD-10-CM

## 2024-08-26 PROCEDURE — 99214 OFFICE O/P EST MOD 30 MIN: CPT | Performed by: FAMILY MEDICINE

## 2024-08-26 RX ORDER — BUSPIRONE HYDROCHLORIDE 10 MG/1
10 TABLET ORAL EVERY MORNING
COMMUNITY

## 2024-08-26 RX ORDER — TOLTERODINE 4 MG/1
4 CAPSULE, EXTENDED RELEASE ORAL DAILY
Qty: 30 CAPSULE | Refills: 3 | Status: SHIPPED | OUTPATIENT
Start: 2024-08-26 | End: 2024-08-28

## 2024-08-26 NOTE — PROGRESS NOTES
Chief Complaint   Patient presents with    Shoulder Pain    Pain     Foot, ankle, knee and hand pain right side        \"Have you been to the ER, urgent care clinic since your last visit?  Hospitalized since your last visit?\"    NO    “Have you seen or consulted any other health care providers outside of [facility] since your last visit?”    NO     “Have you had a pap smear?”    NO    Date of last Cervical Cancer screen (HPV or PAP): 6/15/2018             Click Here for Release of Records Request     No results found for this visit on 24.   Vitals:    24 1557   BP: 105/74   Pulse: 85   Resp: 18   Temp: 97.7 °F (36.5 °C)   TempSrc: Infrared   SpO2: 97%   Weight: 67.1 kg (148 lb)      Health Maintenance Due   Topic Date Due    Cervical cancer screen  06/15/2023        The patient, Stefanie Perez, identity was verified by name and .
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
## 2024-08-27 NOTE — TELEPHONE ENCOUNTER
Per pharmacy, Tolterodine is not covered. Pharmacy has pended a covered alternative. Please input missing prescribing info and sign if appropriate.        For Pharmacy Admin Tracking Only    Program: Medication Refill  CPA in place:    Recommendation Provided To:   Intervention Detail: New Rx: 1, reason: Cost/Formulary Change  Intervention Accepted By:   Gap Closed?:    Time Spent (min): 5

## 2024-08-28 RX ORDER — OXYBUTYNIN CHLORIDE 5 MG/1
5 TABLET, EXTENDED RELEASE ORAL EVERY EVENING
Qty: 30 TABLET | Refills: 1 | Status: SHIPPED | OUTPATIENT
Start: 2024-08-28

## 2024-12-12 ENCOUNTER — TELEPHONE (OUTPATIENT)
Age: 49
End: 2024-12-12

## 2024-12-12 NOTE — TELEPHONE ENCOUNTER
Returned call to pt,  informed Dr Valerio will complete her letter during her next office visit.   She and son  stated understanding

## 2025-03-11 ENCOUNTER — TELEPHONE (OUTPATIENT)
Age: 50
End: 2025-03-11

## 2025-03-11 NOTE — TELEPHONE ENCOUNTER
Sent mychart to patient regarding upcoming Medicare wellness appointment and completion of HRA questionnaire.

## 2025-03-13 ENCOUNTER — OFFICE VISIT (OUTPATIENT)
Age: 50
End: 2025-03-13
Payer: MEDICARE

## 2025-03-13 VITALS
BODY MASS INDEX: 31.07 KG/M2 | TEMPERATURE: 97.5 F | OXYGEN SATURATION: 97 % | HEART RATE: 81 BPM | RESPIRATION RATE: 17 BRPM | SYSTOLIC BLOOD PRESSURE: 130 MMHG | WEIGHT: 148 LBS | HEIGHT: 58 IN | DIASTOLIC BLOOD PRESSURE: 86 MMHG

## 2025-03-13 DIAGNOSIS — Z12.31 ENCOUNTER FOR SCREENING MAMMOGRAM FOR BREAST CANCER: ICD-10-CM

## 2025-03-13 DIAGNOSIS — M79.641 BILATERAL HAND PAIN: ICD-10-CM

## 2025-03-13 DIAGNOSIS — K59.04 CHRONIC IDIOPATHIC CONSTIPATION: ICD-10-CM

## 2025-03-13 DIAGNOSIS — Z00.00 INITIAL MEDICARE ANNUAL WELLNESS VISIT: Primary | ICD-10-CM

## 2025-03-13 DIAGNOSIS — M79.642 BILATERAL HAND PAIN: ICD-10-CM

## 2025-03-13 DIAGNOSIS — R79.9 ABNORMAL FINDING OF BLOOD CHEMISTRY, UNSPECIFIED: ICD-10-CM

## 2025-03-13 DIAGNOSIS — Z12.4 ENCOUNTER FOR SCREENING FOR CERVICAL CANCER: ICD-10-CM

## 2025-03-13 DIAGNOSIS — Z12.11 SCREENING FOR MALIGNANT NEOPLASM OF COLON: ICD-10-CM

## 2025-03-13 PROCEDURE — G0438 PPPS, INITIAL VISIT: HCPCS | Performed by: FAMILY MEDICINE

## 2025-03-13 SDOH — ECONOMIC STABILITY: FOOD INSECURITY: WITHIN THE PAST 12 MONTHS, THE FOOD YOU BOUGHT JUST DIDN'T LAST AND YOU DIDN'T HAVE MONEY TO GET MORE.: NEVER TRUE

## 2025-03-13 SDOH — ECONOMIC STABILITY: FOOD INSECURITY: WITHIN THE PAST 12 MONTHS, YOU WORRIED THAT YOUR FOOD WOULD RUN OUT BEFORE YOU GOT MONEY TO BUY MORE.: NEVER TRUE

## 2025-03-13 ASSESSMENT — PATIENT HEALTH QUESTIONNAIRE - PHQ9
2. FEELING DOWN, DEPRESSED OR HOPELESS: NOT AT ALL
10. IF YOU CHECKED OFF ANY PROBLEMS, HOW DIFFICULT HAVE THESE PROBLEMS MADE IT FOR YOU TO DO YOUR WORK, TAKE CARE OF THINGS AT HOME, OR GET ALONG WITH OTHER PEOPLE: NOT DIFFICULT AT ALL
3. TROUBLE FALLING OR STAYING ASLEEP: NOT AT ALL
5. POOR APPETITE OR OVEREATING: NOT AT ALL
SUM OF ALL RESPONSES TO PHQ QUESTIONS 1-9: 0
4. FEELING TIRED OR HAVING LITTLE ENERGY: NOT AT ALL
SUM OF ALL RESPONSES TO PHQ QUESTIONS 1-9: 0
SUM OF ALL RESPONSES TO PHQ QUESTIONS 1-9: 0
8. MOVING OR SPEAKING SO SLOWLY THAT OTHER PEOPLE COULD HAVE NOTICED. OR THE OPPOSITE, BEING SO FIGETY OR RESTLESS THAT YOU HAVE BEEN MOVING AROUND A LOT MORE THAN USUAL: NOT AT ALL
9. THOUGHTS THAT YOU WOULD BE BETTER OFF DEAD, OR OF HURTING YOURSELF: NOT AT ALL
7. TROUBLE CONCENTRATING ON THINGS, SUCH AS READING THE NEWSPAPER OR WATCHING TELEVISION: NOT AT ALL
1. LITTLE INTEREST OR PLEASURE IN DOING THINGS: NOT AT ALL
6. FEELING BAD ABOUT YOURSELF - OR THAT YOU ARE A FAILURE OR HAVE LET YOURSELF OR YOUR FAMILY DOWN: NOT AT ALL
SUM OF ALL RESPONSES TO PHQ QUESTIONS 1-9: 0

## 2025-03-13 ASSESSMENT — LIFESTYLE VARIABLES
HOW MANY STANDARD DRINKS CONTAINING ALCOHOL DO YOU HAVE ON A TYPICAL DAY: PATIENT DOES NOT DRINK
HOW OFTEN DO YOU HAVE A DRINK CONTAINING ALCOHOL: NEVER

## 2025-03-13 NOTE — PATIENT INSTRUCTIONS
participating in exercise and having meals with you, even if they may be eating something different.  Find what works best for you. If you do not have time or do not like to cook, a program that offers meal replacement bars or shakes may be better for you. Or if you like to prepare meals, finding a plan that includes daily menus and recipes may be best.  Ask your doctor about other health professionals who can help you achieve your weight-loss goals.  A dietitian can help you make healthy changes in your diet.  An exercise specialist or  can help you develop a safe and effective exercise program.  A counselor or psychiatrist can help you cope with issues such as depression, anxiety, or family problems that can make it hard to focus on weight loss.  Consider joining a support group for people who are trying to lose weight. Your doctor can suggest groups in your area.  Where can you learn more?  Go to https://www.[employer].CliqSearch/patientEd and enter U357 to learn more about \"Starting a Weight-Loss Plan: Care Instructions.\"  Current as of: April 30, 2024  Content Version: 14.4  © 4418-3639 [employer].   Care instructions adapted under license by [employer]. If you have questions about a medical condition or this instruction, always ask your healthcare professional. [employer] disclaims any warranty or liability for your use of this information.         Advance Directives: Care Instructions  Overview  An advance directive is a legal way to state your wishes at the end of your life. It tells your family and your doctor what to do if you can't say what you want.  There are two main types of advance directives. You can change them any time your wishes change.  Living will.  This form tells your family and your doctor your wishes about life support and other treatment. The form is also called a declaration.  Medical power of .  This form lets you name a person to make

## 2025-03-13 NOTE — PROGRESS NOTES
Medicare Annual Wellness Visit    Stefanie Perez is here for Medicare AWV, Headache, and Fall (X 2 -3 weeks ago  fell twice [facility]  Urgent care)    Assessment & Plan   Initial Medicare annual wellness visit     No follow-ups on file.     Subjective     Patient's complete Health Risk Assessment and screening values have been reviewed and are found in Flowsheets. The following problems were reviewed today and where indicated follow up appointments were made and/or referrals ordered.    Positive Risk Factor Screenings with Interventions:    Fall Risk:  Do you feel unsteady or are you worried about falling? : (!) yes (walks with a walker)  2 or more falls in past year?: (!) yes  Fall with injury in past year?: no     Interventions:    Reviewed medications, home hazards, visual acuity, and co-morbidities that can increase risk for falls  Patient advised to follow-up in this office for further evaluation and treatment    Cognitive:      Words recalled: 1 Word Recalled     Total Score Interpretation: Abnormal Mini-Cog  Interventions:  Patient advised to follow-up in this office for further evaluation and treatment            Inactivity:  On average, how many days per week do you engage in moderate to strenuous exercise (like a brisk walk)?: 0 days (!) Abnormal  On average, how many minutes do you engage in exercise at this level?: 0 min  Interventions:  Patient advised to follow up in the office for further evaluation and treatment     Abnormal BMI (obese):  Body mass index is 30.93 kg/m². (!) Abnormal  Interventions:  Patient declines any further evaluation or treatment        Dentist Screen:  Have you seen the dentist within the past year?: (!) No    Intervention:  Advised to schedule with their dentist        Advanced Directives:  Do you have a Living Will?: (!) No    Intervention:  see ACP note    Advance Care Planning           Conversation Conducted with:   Patient with Decision Making Capacity, stating that the

## 2025-03-13 NOTE — PROGRESS NOTES
Chief Complaint   Patient presents with    Medicare AWV    Headache    Fall     X 2 -3 weeks ago  fell twice [facility]  Urgent [facility]       \"Have you been to the ER, urgent care clinic since your last visit?  Hospitalized since your last visit?\"    NO    “Have you seen or consulted any other health care providers outside of [facility] since your last visit?”    NO     “Have you had a pap smear?”    YES    Date of last Cervical Cancer screen (HPV or PAP): 6/15/2018           Click Here for Release of Records Request     No results found for this visit on 25.   Vitals:    25 1437   BP: 130/86   Pulse: 81   Resp: 17   Temp: 97.5 °F (36.4 °C)   TempSrc: Infrared   SpO2: 97%   Weight: 67.1 kg (148 lb)   Height: 1.473 m (4' 10\")          The patient, Stefanie Perez, identity was verified by name and .

## 2025-03-14 LAB
ALBUMIN SERPL-MCNC: 4.4 G/DL (ref 3.5–5)
ALBUMIN/GLOB SERPL: 1.3 (ref 1.1–2.2)
ALP SERPL-CCNC: 74 U/L (ref 45–117)
ALT SERPL-CCNC: 22 U/L (ref 12–78)
ANION GAP SERPL CALC-SCNC: 5 MMOL/L (ref 2–12)
APPEARANCE UR: CLEAR
AST SERPL-CCNC: 16 U/L (ref 15–37)
BACTERIA URNS QL MICRO: NEGATIVE /HPF
BASOPHILS # BLD: 0.03 K/UL (ref 0–0.1)
BASOPHILS NFR BLD: 0.5 % (ref 0–1)
BILIRUB SERPL-MCNC: 0.4 MG/DL (ref 0.2–1)
BILIRUB UR QL: NEGATIVE
BUN SERPL-MCNC: 15 MG/DL (ref 6–20)
BUN/CREAT SERPL: 27 (ref 12–20)
CALCIUM SERPL-MCNC: 9.5 MG/DL (ref 8.5–10.1)
CHLORIDE SERPL-SCNC: 107 MMOL/L (ref 97–108)
CHOLEST SERPL-MCNC: 171 MG/DL
CO2 SERPL-SCNC: 26 MMOL/L (ref 21–32)
COLOR UR: NORMAL
CREAT SERPL-MCNC: 0.56 MG/DL (ref 0.55–1.02)
DIFFERENTIAL METHOD BLD: NORMAL
EOSINOPHIL # BLD: 0.16 K/UL (ref 0–0.4)
EOSINOPHIL NFR BLD: 2.6 % (ref 0–7)
EPITH CASTS URNS QL MICRO: NORMAL /LPF
ERYTHROCYTE [DISTWIDTH] IN BLOOD BY AUTOMATED COUNT: 13.3 % (ref 11.5–14.5)
EST. AVERAGE GLUCOSE BLD GHB EST-MCNC: 103 MG/DL
GLOBULIN SER CALC-MCNC: 3.5 G/DL (ref 2–4)
GLUCOSE SERPL-MCNC: 106 MG/DL (ref 65–100)
GLUCOSE UR STRIP.AUTO-MCNC: NEGATIVE MG/DL
HBA1C MFR BLD: 5.2 % (ref 4–5.6)
HCT VFR BLD AUTO: 41.1 % (ref 35–47)
HDLC SERPL-MCNC: 60 MG/DL
HDLC SERPL: 2.9 (ref 0–5)
HGB BLD-MCNC: 13.9 G/DL (ref 11.5–16)
HGB UR QL STRIP: NEGATIVE
HYALINE CASTS URNS QL MICRO: NORMAL /LPF (ref 0–5)
IMM GRANULOCYTES # BLD AUTO: 0.02 K/UL (ref 0–0.04)
IMM GRANULOCYTES NFR BLD AUTO: 0.3 % (ref 0–0.5)
KETONES UR QL STRIP.AUTO: NEGATIVE MG/DL
LDLC SERPL CALC-MCNC: 90.6 MG/DL (ref 0–100)
LEUKOCYTE ESTERASE UR QL STRIP.AUTO: NEGATIVE
LYMPHOCYTES # BLD: 2.81 K/UL (ref 0.8–3.5)
LYMPHOCYTES NFR BLD: 45.5 % (ref 12–49)
MCH RBC QN AUTO: 28.8 PG (ref 26–34)
MCHC RBC AUTO-ENTMCNC: 33.8 G/DL (ref 30–36.5)
MCV RBC AUTO: 85.1 FL (ref 80–99)
MONOCYTES # BLD: 0.47 K/UL (ref 0–1)
MONOCYTES NFR BLD: 7.6 % (ref 5–13)
NEUTS SEG # BLD: 2.68 K/UL (ref 1.8–8)
NEUTS SEG NFR BLD: 43.5 % (ref 32–75)
NITRITE UR QL STRIP.AUTO: NEGATIVE
NRBC # BLD: 0 K/UL (ref 0–0.01)
NRBC BLD-RTO: 0 PER 100 WBC
PH UR STRIP: 5.5 (ref 5–8)
PLATELET # BLD AUTO: 285 K/UL (ref 150–400)
PMV BLD AUTO: 11.5 FL (ref 8.9–12.9)
POTASSIUM SERPL-SCNC: 4.1 MMOL/L (ref 3.5–5.1)
PROT SERPL-MCNC: 7.9 G/DL (ref 6.4–8.2)
PROT UR STRIP-MCNC: NEGATIVE MG/DL
RBC # BLD AUTO: 4.83 M/UL (ref 3.8–5.2)
RBC #/AREA URNS HPF: NORMAL /HPF (ref 0–5)
SODIUM SERPL-SCNC: 138 MMOL/L (ref 136–145)
SP GR UR REFRACTOMETRY: 1.01 (ref 1–1.03)
T4 FREE SERPL-MCNC: 1.1 NG/DL (ref 0.8–1.5)
TRIGL SERPL-MCNC: 102 MG/DL
TSH SERPL DL<=0.05 MIU/L-ACNC: 4.46 UIU/ML (ref 0.36–3.74)
URINE CULTURE IF INDICATED: NORMAL
UROBILINOGEN UR QL STRIP.AUTO: 0.2 EU/DL (ref 0.2–1)
VLDLC SERPL CALC-MCNC: 20.4 MG/DL
WBC # BLD AUTO: 6.2 K/UL (ref 3.6–11)
WBC URNS QL MICRO: NORMAL /HPF (ref 0–4)

## 2025-03-19 ENCOUNTER — RESULTS FOLLOW-UP (OUTPATIENT)
Age: 50
End: 2025-03-19

## 2025-04-30 ENCOUNTER — TELEPHONE (OUTPATIENT)
Age: 50
End: 2025-04-30

## 2025-04-30 NOTE — TELEPHONE ENCOUNTER
Appointment Request From: Stefanie Perez     With Provider: Dr. Roscoe Valerio MD [[facility] [place] MAIN OFFICE-[facility]]     Preferred Date Range: 5/21/2025 - 5/26/2025     Preferred Times: Monday Afternoon, Tuesday Afternoon, Wednesday Afternoon, Thursday Afternoon, Friday Afternoon     Reason for visit: Request an Appointment     Health Maintenance Topic:      Comments:  She was hospitalized, and wants a follow up with PCP.     Spoke with patient's daughter to inform that the Nurse will be contacting patient to schedule appointment for hospital follow up. Patient was admitted for right side pain and numbness and unable to move. Patient's contact number is [phone] and she does not speak English.

## 2025-05-28 RX ORDER — AZELASTINE HYDROCHLORIDE 0.5 MG/ML
SOLUTION/ DROPS OPHTHALMIC
Qty: 6 ML | Refills: 1 | Status: SHIPPED | OUTPATIENT
Start: 2025-05-28

## 2025-06-13 ENCOUNTER — TRANSCRIBE ORDERS (OUTPATIENT)
Facility: HOSPITAL | Age: 50
End: 2025-06-13

## 2025-06-13 DIAGNOSIS — Z12.31 OTHER SCREENING MAMMOGRAM: Primary | ICD-10-CM

## 2025-07-09 ENCOUNTER — HOSPITAL ENCOUNTER (OUTPATIENT)
Facility: HOSPITAL | Age: 50
Discharge: HOME | End: 2025-07-12
Attending: FAMILY MEDICINE
Payer: MEDICARE

## 2025-07-09 DIAGNOSIS — Z12.31 OTHER SCREENING MAMMOGRAM: ICD-10-CM

## 2025-07-09 PROCEDURE — 77063 BREAST TOMOSYNTHESIS BI: CPT

## (undated) DEVICE — SOLIDIFIER FLUID 3000 CC ABSORB

## (undated) DEVICE — QUILTED PREMIUM COMFORT UNDERPAD,EXTRA HEAVY: Brand: WINGS

## (undated) DEVICE — Device: Brand: MEDICAL ACTION INDUSTRIES

## (undated) DEVICE — BAG BELONG PT PERS CLEAR HANDL

## (undated) DEVICE — AIRLIFE™ U/CONNECT-IT OXYGEN TUBING 7 FEET (2.1 M) CRUSH-RESISTANT OXYGEN TUBING, VINYL TIPPED: Brand: AIRLIFE™

## (undated) DEVICE — SYRINGE MED 20ML STD CLR PLAS LUERLOCK TIP N CTRL DISP

## (undated) DEVICE — CATH IV AUTOGRD BC BLU 22GA 25 -- INSYTE

## (undated) DEVICE — ENDO CARRY-ON PROCEDURE KIT INCLUDES ENZYMATIC SPONGE, GAUZE, BIOHAZARD LABEL, TRAY, LUBRICANT, DIRTY SCOPE LABEL, WATER LABEL, TRAY, DRAWSTRING PAD, AND DEFENDO 4-PIECE KIT.: Brand: ENDO CARRY-ON PROCEDURE KIT

## (undated) DEVICE — Z DISCONTINUED NO SUB IDED SET EXTN W/ 4 W STPCOCK M SPIN LOK 36IN

## (undated) DEVICE — CANN NASAL O2 CAPNOGRAPHY AD -- FILTERLINE

## (undated) DEVICE — KENDALL RADIOLUCENT FOAM MONITORING ELECTRODE -RECTANGULAR SHAPE: Brand: KENDALL

## (undated) DEVICE — Z DISCONTINUED USE 2751540 TUBING IRRIG L10IN DISP PMP ENDOGATOR

## (undated) DEVICE — CONNECTOR TBNG AUX H2O JET DISP FOR OLY 160/180 SER

## (undated) DEVICE — BW-412T DISP COMBO CLEANING BRUSH: Brand: SINGLE USE COMBINATION CLEANING BRUSH

## (undated) DEVICE — Device

## (undated) DEVICE — NEEDLE HYPO 18GA L1.5IN PNK S STL HUB POLYPR SHLD REG BVL

## (undated) DEVICE — 1200 GUARD II KIT W/5MM TUBE W/O VAC TUBE: Brand: GUARDIAN

## (undated) DEVICE — BITE BLK ENDOSCP AD 54FR GRN POLYETH ENDOSCP W STRP SLD

## (undated) DEVICE — NEONATAL-ADULT SPO2 SENSOR: Brand: NELLCOR

## (undated) DEVICE — FORCEPS BX L240CM JAW DIA2.8MM L CAP W/ NDL MIC MESH TOOTH

## (undated) DEVICE — BAG SPEC BIOHZD LF 2MIL 6X10IN -- CONVERT TO ITEM 357326

## (undated) DEVICE — SYR 50ML SLIP TIP NSAF LF STRL --

## (undated) DEVICE — SET ADMIN 16ML TBNG L100IN 2 Y INJ SITE IV PIGGY BK DISP